# Patient Record
Sex: FEMALE | Employment: PART TIME | URBAN - METROPOLITAN AREA
[De-identification: names, ages, dates, MRNs, and addresses within clinical notes are randomized per-mention and may not be internally consistent; named-entity substitution may affect disease eponyms.]

---

## 2017-04-12 ENCOUNTER — OFFICE VISIT (OUTPATIENT)
Dept: FAMILY MEDICINE CLINIC | Age: 32
End: 2017-04-12
Payer: MEDICARE

## 2017-04-12 VITALS
DIASTOLIC BLOOD PRESSURE: 60 MMHG | WEIGHT: 153 LBS | HEART RATE: 100 BPM | HEIGHT: 63 IN | BODY MASS INDEX: 27.11 KG/M2 | TEMPERATURE: 99.1 F | SYSTOLIC BLOOD PRESSURE: 128 MMHG

## 2017-04-12 DIAGNOSIS — H66.011 ACUTE SUPPURATIVE OTITIS MEDIA OF RIGHT EAR WITH SPONTANEOUS RUPTURE OF TYMPANIC MEMBRANE, RECURRENCE NOT SPECIFIED: Primary | ICD-10-CM

## 2017-04-12 PROCEDURE — 99213 OFFICE O/P EST LOW 20 MIN: CPT | Performed by: FAMILY MEDICINE

## 2017-04-12 RX ORDER — DOCOSAHEXAENOIC ACID 200 MG
CAPSULE ORAL
Refills: 11 | COMMUNITY
Start: 2017-02-03 | End: 2018-08-06 | Stop reason: SDUPTHER

## 2017-04-12 RX ORDER — AMOXICILLIN 875 MG/1
875 TABLET, COATED ORAL 2 TIMES DAILY
Qty: 20 TABLET | Refills: 0 | Status: SHIPPED | OUTPATIENT
Start: 2017-04-12 | End: 2017-04-22

## 2017-04-12 ASSESSMENT — ENCOUNTER SYMPTOMS
SORE THROAT: 0
EYES NEGATIVE: 1
SHORTNESS OF BREATH: 0
RHINORRHEA: 1
COUGH: 1

## 2017-12-13 ENCOUNTER — HOSPITAL ENCOUNTER (EMERGENCY)
Age: 32
Discharge: HOME | End: 2017-12-13
Attending: EMERGENCY MEDICINE
Payer: COMMERCIAL

## 2017-12-13 ENCOUNTER — APPOINTMENT (OUTPATIENT)
Dept: CT IMAGING | Age: 32
End: 2017-12-13
Payer: COMMERCIAL

## 2017-12-13 VITALS
RESPIRATION RATE: 16 BRPM | HEIGHT: 63 IN | OXYGEN SATURATION: 97 % | WEIGHT: 160 LBS | HEART RATE: 106 BPM | TEMPERATURE: 98.9 F | SYSTOLIC BLOOD PRESSURE: 103 MMHG | DIASTOLIC BLOOD PRESSURE: 76 MMHG | BODY MASS INDEX: 28.35 KG/M2

## 2017-12-13 DIAGNOSIS — J03.90 TONSILLITIS, PHLEGMONOUS: Primary | ICD-10-CM

## 2017-12-13 LAB
ABSOLUTE EOS #: 0 K/UL (ref 0–0.4)
ABSOLUTE IMMATURE GRANULOCYTE: ABNORMAL K/UL (ref 0–0.3)
ABSOLUTE LYMPH #: 1.1 K/UL (ref 1–4.8)
ABSOLUTE MONO #: 1.3 K/UL (ref 0.2–0.8)
ANION GAP SERPL CALCULATED.3IONS-SCNC: 21 MMOL/L (ref 9–17)
BASOPHILS # BLD: 0 % (ref 0–2)
BASOPHILS ABSOLUTE: 0 K/UL (ref 0–0.2)
BUN BLDV-MCNC: 12 MG/DL (ref 6–20)
BUN/CREAT BLD: 16 (ref 9–20)
CALCIUM SERPL-MCNC: 9.4 MG/DL (ref 8.6–10.4)
CHLORIDE BLD-SCNC: 96 MMOL/L (ref 98–107)
CO2: 20 MMOL/L (ref 20–31)
CREAT SERPL-MCNC: 0.77 MG/DL (ref 0.5–0.9)
DIFFERENTIAL TYPE: ABNORMAL
DIRECT EXAM: NORMAL
EOSINOPHILS RELATIVE PERCENT: 0 % (ref 1–4)
GFR AFRICAN AMERICAN: >60 ML/MIN
GFR NON-AFRICAN AMERICAN: >60 ML/MIN
GFR SERPL CREATININE-BSD FRML MDRD: ABNORMAL ML/MIN/{1.73_M2}
GFR SERPL CREATININE-BSD FRML MDRD: ABNORMAL ML/MIN/{1.73_M2}
GLUCOSE BLD-MCNC: 112 MG/DL (ref 70–99)
HCT VFR BLD CALC: 44.5 % (ref 36–46)
HEMOGLOBIN: 14.8 G/DL (ref 12–16)
IMMATURE GRANULOCYTES: ABNORMAL %
LACTIC ACID: 1.4 MMOL/L (ref 0.5–2.2)
LYMPHOCYTES # BLD: 9 % (ref 24–44)
Lab: NORMAL
MCH RBC QN AUTO: 30.1 PG (ref 26–34)
MCHC RBC AUTO-ENTMCNC: 33.4 G/DL (ref 31–37)
MCV RBC AUTO: 90.1 FL (ref 80–100)
MONOCYTES # BLD: 10 % (ref 1–7)
PDW BLD-RTO: 12.7 % (ref 11.5–14.5)
PLATELET # BLD: 321 K/UL (ref 130–400)
PLATELET ESTIMATE: ABNORMAL
PMV BLD AUTO: 7.3 FL (ref 6–12)
POTASSIUM SERPL-SCNC: 3.8 MMOL/L (ref 3.7–5.3)
RBC # BLD: 4.94 M/UL (ref 4–5.2)
RBC # BLD: ABNORMAL 10*6/UL
SEG NEUTROPHILS: 81 % (ref 36–66)
SEGMENTED NEUTROPHILS ABSOLUTE COUNT: 10.5 K/UL (ref 1.8–7.7)
SODIUM BLD-SCNC: 137 MMOL/L (ref 135–144)
SPECIMEN DESCRIPTION: NORMAL
STATUS: NORMAL
STATUS: NORMAL
TSH SERPL DL<=0.05 MIU/L-ACNC: 0.69 MIU/L (ref 0.3–5)
WBC # BLD: 13 K/UL (ref 3.5–11)
WBC # BLD: ABNORMAL 10*3/UL

## 2017-12-13 PROCEDURE — 87651 STREP A DNA AMP PROBE: CPT

## 2017-12-13 PROCEDURE — 96376 TX/PRO/DX INJ SAME DRUG ADON: CPT

## 2017-12-13 PROCEDURE — 96375 TX/PRO/DX INJ NEW DRUG ADDON: CPT

## 2017-12-13 PROCEDURE — 85025 COMPLETE CBC W/AUTO DIFF WBC: CPT

## 2017-12-13 PROCEDURE — 83605 ASSAY OF LACTIC ACID: CPT

## 2017-12-13 PROCEDURE — 96365 THER/PROPH/DIAG IV INF INIT: CPT

## 2017-12-13 PROCEDURE — 80048 BASIC METABOLIC PNL TOTAL CA: CPT

## 2017-12-13 PROCEDURE — 84443 ASSAY THYROID STIM HORMONE: CPT

## 2017-12-13 PROCEDURE — 99283 EMERGENCY DEPT VISIT LOW MDM: CPT

## 2017-12-13 PROCEDURE — 6360000004 HC RX CONTRAST MEDICATION: Performed by: NURSE PRACTITIONER

## 2017-12-13 PROCEDURE — 70491 CT SOFT TISSUE NECK W/DYE: CPT

## 2017-12-13 PROCEDURE — 2580000003 HC RX 258: Performed by: NURSE PRACTITIONER

## 2017-12-13 PROCEDURE — 6360000002 HC RX W HCPCS: Performed by: NURSE PRACTITIONER

## 2017-12-13 PROCEDURE — 36415 COLL VENOUS BLD VENIPUNCTURE: CPT

## 2017-12-13 RX ORDER — HYDROCODONE BITARTRATE AND ACETAMINOPHEN 5; 325 MG/1; MG/1
1 TABLET ORAL EVERY 6 HOURS PRN
Qty: 20 TABLET | Refills: 0 | Status: SHIPPED | OUTPATIENT
Start: 2017-12-13 | End: 2017-12-13

## 2017-12-13 RX ORDER — ONDANSETRON 4 MG/1
4 TABLET, ORALLY DISINTEGRATING ORAL EVERY 8 HOURS PRN
Qty: 20 TABLET | Refills: 0 | Status: SHIPPED | OUTPATIENT
Start: 2017-12-13 | End: 2017-12-13

## 2017-12-13 RX ORDER — MORPHINE SULFATE 2 MG/ML
2 INJECTION, SOLUTION INTRAMUSCULAR; INTRAVENOUS ONCE
Status: COMPLETED | OUTPATIENT
Start: 2017-12-13 | End: 2017-12-13

## 2017-12-13 RX ORDER — 0.9 % SODIUM CHLORIDE 0.9 %
50 INTRAVENOUS SOLUTION INTRAVENOUS ONCE
Status: COMPLETED | OUTPATIENT
Start: 2017-12-13 | End: 2017-12-13

## 2017-12-13 RX ORDER — 0.9 % SODIUM CHLORIDE 0.9 %
1000 INTRAVENOUS SOLUTION INTRAVENOUS ONCE
Status: COMPLETED | OUTPATIENT
Start: 2017-12-13 | End: 2017-12-13

## 2017-12-13 RX ORDER — AMOXICILLIN AND CLAVULANATE POTASSIUM 875; 125 MG/1; MG/1
1 TABLET, FILM COATED ORAL 2 TIMES DAILY
COMMUNITY
End: 2018-08-06 | Stop reason: ALTCHOICE

## 2017-12-13 RX ORDER — HYDROCODONE BITARTRATE AND ACETAMINOPHEN 5; 325 MG/1; MG/1
1 TABLET ORAL EVERY 6 HOURS PRN
Qty: 20 TABLET | Refills: 0 | Status: SHIPPED | OUTPATIENT
Start: 2017-12-13 | End: 2017-12-20

## 2017-12-13 RX ORDER — DEXAMETHASONE SODIUM PHOSPHATE 10 MG/ML
10 INJECTION INTRAMUSCULAR; INTRAVENOUS ONCE
Status: COMPLETED | OUTPATIENT
Start: 2017-12-13 | End: 2017-12-13

## 2017-12-13 RX ORDER — ONDANSETRON 4 MG/1
4 TABLET, ORALLY DISINTEGRATING ORAL EVERY 8 HOURS PRN
Qty: 20 TABLET | Refills: 0 | Status: SHIPPED | OUTPATIENT
Start: 2017-12-13 | End: 2018-10-04

## 2017-12-13 RX ORDER — SODIUM CHLORIDE 0.9 % (FLUSH) 0.9 %
10 SYRINGE (ML) INJECTION PRN
Status: DISCONTINUED | OUTPATIENT
Start: 2017-12-13 | End: 2017-12-13 | Stop reason: HOSPADM

## 2017-12-13 RX ORDER — ONDANSETRON 2 MG/ML
4 INJECTION INTRAMUSCULAR; INTRAVENOUS ONCE
Status: COMPLETED | OUTPATIENT
Start: 2017-12-13 | End: 2017-12-13

## 2017-12-13 RX ADMIN — ONDANSETRON 4 MG: 2 INJECTION INTRAMUSCULAR; INTRAVENOUS at 15:03

## 2017-12-13 RX ADMIN — AMPICILLIN SODIUM AND SULBACTAM SODIUM 1.5 G: 1; .5 INJECTION, POWDER, FOR SOLUTION INTRAMUSCULAR; INTRAVENOUS at 15:14

## 2017-12-13 RX ADMIN — SODIUM CHLORIDE 1000 ML: 9 INJECTION, SOLUTION INTRAVENOUS at 15:03

## 2017-12-13 RX ADMIN — SODIUM CHLORIDE 50 ML: 9 INJECTION, SOLUTION INTRAVENOUS at 15:17

## 2017-12-13 RX ADMIN — DEXAMETHASONE SODIUM PHOSPHATE 10 MG: 10 INJECTION INTRAMUSCULAR; INTRAVENOUS at 15:03

## 2017-12-13 RX ADMIN — MORPHINE SULFATE 2 MG: 2 INJECTION, SOLUTION INTRAMUSCULAR; INTRAVENOUS at 15:03

## 2017-12-13 RX ADMIN — SODIUM CHLORIDE 1000 ML: 9 INJECTION, SOLUTION INTRAVENOUS at 16:45

## 2017-12-13 RX ADMIN — Medication 10 ML: at 15:17

## 2017-12-13 RX ADMIN — MORPHINE SULFATE 2 MG: 2 INJECTION, SOLUTION INTRAMUSCULAR; INTRAVENOUS at 15:41

## 2017-12-13 RX ADMIN — SODIUM CHLORIDE 1000 ML: 9 INJECTION, SOLUTION INTRAVENOUS at 16:00

## 2017-12-13 RX ADMIN — IOPAMIDOL 80 ML: 755 INJECTION, SOLUTION INTRAVENOUS at 15:17

## 2017-12-13 ASSESSMENT — PAIN DESCRIPTION - LOCATION: LOCATION: EAR;THROAT

## 2017-12-13 ASSESSMENT — PAIN SCALES - GENERAL
PAINLEVEL_OUTOF10: 10
PAINLEVEL_OUTOF10: 10
PAINLEVEL_OUTOF10: 4
PAINLEVEL_OUTOF10: 10

## 2017-12-13 ASSESSMENT — PAIN DESCRIPTION - ORIENTATION: ORIENTATION: RIGHT

## 2017-12-13 ASSESSMENT — ENCOUNTER SYMPTOMS
VOMITING: 0
SHORTNESS OF BREATH: 0
ABDOMINAL DISTENTION: 0
NAUSEA: 1
SORE THROAT: 1
COUGH: 0
SINUS PRESSURE: 0

## 2017-12-13 ASSESSMENT — PAIN DESCRIPTION - DESCRIPTORS: DESCRIPTORS: SHOOTING;SHARP;STABBING

## 2017-12-13 ASSESSMENT — PAIN DESCRIPTION - PAIN TYPE: TYPE: ACUTE PAIN

## 2017-12-13 NOTE — ED PROVIDER NOTES
[address] [place] [address] ED  eMERGENCY dEPARTMENT eNCOUnter      Pt Name: Gio Henderson  MRN: [id number]  [place] 1985  Date of evaluation: 12/13/2017  Provider: Osei Vasquez NP    CHIEF COMPLAINT       Chief Complaint   Patient presents with    Pharyngitis     since yesterday    Abscess     possible peritonsilar          HISTORY OF PRESENT ILLNESS  (Location/Symptom, Timing/Onset, Context/Setting, Quality, Duration, Modifying Factors, Severity.)   Gio Henderson is a 33 y.o. female who presents to the emergency department Via private auto with complaints of sore throat that started yesterday. She has right-sided sore throat that radiates to the right ear. She rates the pain at a 10 and describes it as burning. Aggravating factors include swallowing. She has been eating and drinking very little over the past 24 hours due to pain. No cough, chest pain or shortness of breath. She is unsure of fever but has had hot and cold chills. She was evaluated by a family friend/physician while at a [place] party last evening. She was placed on Augmentin and advised to follow-up with the ENT for possible peritonsillar abscess. When she called the ENT this morning she was advised to come to the emergency room for further evaluation. Nursing Notes were reviewed. ALLERGIES     Review of patient's allergies indicates no known allergies.     CURRENT MEDICATIONS       Current Discharge Medication List      CONTINUE these medications which have NOT CHANGED    Details   amoxicillin-clavulanate (AUGMENTIN) 875-125 MG per tablet Take 1 tablet by mouth 2 times daily Started 12/12/17      PRENATAL  MG CAPS Refills: 11      vitamin D (CHOLECALCIFEROL) 1000 UNIT TABS tablet Take 1,000 Units by mouth daily      naproxen (NAPROSYN) 500 MG tablet Take 500 mg by mouth 2 times daily (with meals)      Multiple Vitamin (DAILY MULTIVITAMIN PO) Take by mouth      ibuprofen (ADVIL;MOTRIN) 800 MG tablet Take 800 mg by mouth daily             PAST MEDICAL HISTORY   History reviewed. No pertinent past medical history. SURGICAL HISTORY           Procedure Laterality Date     SECTION      DENTAL SURGERY      ECTOPIC PREGNANCY SURGERY      r fallopian tube gone         FAMILY HISTORY     History reviewed. No pertinent family history. No family status information on file. SOCIAL HISTORY      reports that she has quit smoking. She does not have any smokeless tobacco history on file. She reports that she drinks alcohol. She reports that she does not use drugs. REVIEW OF SYSTEMS    (2-9 systems for level 4, 10 or more for level 5)     Review of Systems   Constitutional: Positive for chills and diaphoresis. Negative for fatigue. HENT: Positive for ear pain and sore throat. Negative for drooling, ear discharge, sinus pressure and sneezing. Respiratory: Negative for cough and shortness of breath. Cardiovascular: Negative for chest pain and palpitations. Gastrointestinal: Positive for nausea. Negative for abdominal distention and vomiting. Musculoskeletal: Negative for arthralgias and myalgias. Neurological: Negative for dizziness and headaches. Except as noted above the remainder of the review of systems was reviewed and negative. PHYSICAL EXAM    (up to 7 for level 4, 8 or more for level 5)     ED Triage Vitals [17 1431]   BP Temp Temp Source Pulse Resp SpO2 Height Weight   103/76 98.9 °F (37.2 °C) Oral 132 16 97 % 5' 3\" (1.6 m) 160 lb (72.6 kg)       Physical Exam   Constitutional: She is oriented to person, place, and time. She appears well-developed and well-nourished. HENT:   Head: Normocephalic and atraumatic. Right Ear: Tympanic membrane normal. No swelling or tenderness. Left Ear: Tympanic membrane normal. No swelling or tenderness. Mouth/Throat: Uvula is midline. No uvula swelling. Posterior oropharynx has right side edema and exudates.  Tolerating secretions   Eyes: Vitals:    17 1431 17 1720   BP: 103/76    Pulse: 132 106   Resp: 16    Temp: 98.9 °F (37.2 °C)    TempSrc: Oral    SpO2: 97%    Weight: 160 lb (72.6 kg)    Height: 5' 3\" (1.6 m)        Medical Decision Makin-year-old female with sore throat. She is afebrile but does appear ill. She is tachycardic at 132. Strep screen is negative. White count was slightly elevated at 13,000. CT soft tissue neck does not have any evidence of abscess but does show right tonsillar enlargement and possibly developing phlegmon. She had some improvement with pain medication and antiemetics. Heart rate has improved to 105 after 3 L of normal saline. She will be discharged home with pain medication, antiemetics and instructions to follow-up with the ENT or oral surgeon. She will continue to take her Augmentin as previously prescribed. There is no problem list on file for this patient. FINAL IMPRESSION      1. Tonsillitis, phlegmonous          DISPOSITION/PLAN   DISPOSITION Decision to Discharge    PATIENT REFERRED TO:   Arianna Fuentes MD  [address] [place] [address]  [phone]    Call in 1 day      [address], [address] [facility] [address] [facility]  [phone]      Follow up with the ENT or the oral surgeon    [facility] ED  [address] [facility]  [phone]    If symptoms worsen      DISCHARGE MEDICATIONS:     Current Discharge Medication List      START taking these medications    Details   ondansetron (ZOFRAN ODT) 4 MG disintegrating tablet Take 1 tablet by mouth every 8 hours as needed for Nausea  Qty: 20 tablet, Refills: 0      HYDROcodone-acetaminophen (NORCO) 5-325 MG per tablet Take 1 tablet by mouth every 6 hours as needed for Pain .   Qty: 20 tablet, Refills: 0             (Please note that portions of this note were completed with a voice recognition program.  Efforts were made to edit the dictations but occasionally words are mis-transcribed. )    SAGE HORNE, LISANDRO Horne NP  12/13/17 [id number]

## 2018-05-03 ENCOUNTER — HOSPITAL ENCOUNTER (OUTPATIENT)
Age: 33
Discharge: HOME | End: 2018-05-03
Payer: COMMERCIAL

## 2018-05-03 ENCOUNTER — HOSPITAL ENCOUNTER (OUTPATIENT)
Age: 33
Setting detail: SPECIMEN
Discharge: HOME | End: 2018-05-03

## 2018-05-03 LAB
FOLLICLE STIMULATING HORMONE: 3.2 U/L (ref 1.7–21.5)
GLUCOSE BLD-MCNC: 98 MG/DL (ref 70–99)
HCG QUANTITATIVE: <1 IU/L
LH: 2.5 U/L (ref 1–95.6)
TSH SERPL DL<=0.05 MIU/L-ACNC: 1.33 MIU/L (ref 0.3–5)

## 2018-05-03 PROCEDURE — 83002 ASSAY OF GONADOTROPIN (LH): CPT

## 2018-05-03 PROCEDURE — 36415 COLL VENOUS BLD VENIPUNCTURE: CPT

## 2018-05-03 PROCEDURE — 84443 ASSAY THYROID STIM HORMONE: CPT

## 2018-05-03 PROCEDURE — 83001 ASSAY OF GONADOTROPIN (FSH): CPT

## 2018-05-03 PROCEDURE — 82947 ASSAY GLUCOSE BLOOD QUANT: CPT

## 2018-05-03 PROCEDURE — 84702 CHORIONIC GONADOTROPIN TEST: CPT

## 2018-05-04 LAB
HPV SAMPLE: NORMAL
HPV SOURCE: NORMAL
HPV, GENOTYPE 16: NOT DETECTED
HPV, GENOTYPE 18: NOT DETECTED
HPV, HIGH RISK OTHER: NOT DETECTED
HPV, INTERPRETATION: NORMAL

## 2018-05-11 ENCOUNTER — EMPLOYEE WELLNESS (OUTPATIENT)
Dept: OTHER | Age: 33
End: 2018-05-11

## 2018-05-11 LAB
CHOLESTEROL/HDL RATIO: 3.6
CHOLESTEROL: 192 MG/DL
GLUCOSE BLD-MCNC: 93 MG/DL (ref 70–99)
HDLC SERPL-MCNC: 53 MG/DL
LDL CHOLESTEROL: 118 MG/DL (ref 0–130)
PATIENT FASTING?: NORMAL
TRIGL SERPL-MCNC: 103 MG/DL
VLDLC SERPL CALC-MCNC: NORMAL MG/DL (ref 1–30)

## 2018-05-21 VITALS — WEIGHT: 172 LBS | BODY MASS INDEX: 30.47 KG/M2

## 2018-05-23 ENCOUNTER — OFFICE VISIT (OUTPATIENT)
Dept: FAMILY MEDICINE CLINIC | Age: 33
End: 2018-05-23
Payer: COMMERCIAL

## 2018-05-23 VITALS
WEIGHT: 171 LBS | TEMPERATURE: 98.7 F | HEART RATE: 90 BPM | DIASTOLIC BLOOD PRESSURE: 70 MMHG | HEIGHT: 63 IN | BODY MASS INDEX: 30.3 KG/M2 | RESPIRATION RATE: 18 BRPM | SYSTOLIC BLOOD PRESSURE: 92 MMHG | OXYGEN SATURATION: 99 %

## 2018-05-23 DIAGNOSIS — H65.92 OTITIS MEDIA WITH EFFUSION, LEFT: Primary | ICD-10-CM

## 2018-05-23 PROCEDURE — 99202 OFFICE O/P NEW SF 15 MIN: CPT | Performed by: NURSE PRACTITIONER

## 2018-05-23 RX ORDER — PREDNISONE 20 MG/1
20 TABLET ORAL 2 TIMES DAILY
Qty: 10 TABLET | Refills: 0 | Status: SHIPPED | OUTPATIENT
Start: 2018-05-23 | End: 2018-05-28

## 2018-05-23 RX ORDER — FLUTICASONE PROPIONATE 50 MCG
2 SPRAY, SUSPENSION (ML) NASAL DAILY
Qty: 1 BOTTLE | Refills: 0 | Status: SHIPPED | OUTPATIENT
Start: 2018-05-23 | End: 2018-08-06 | Stop reason: ALTCHOICE

## 2018-05-23 ASSESSMENT — ENCOUNTER SYMPTOMS
WHEEZING: 0
RHINORRHEA: 1
SINUS PRESSURE: 0
EYE REDNESS: 0
COUGH: 0
VOICE CHANGE: 0
CHEST TIGHTNESS: 0
SHORTNESS OF BREATH: 0
EYE DISCHARGE: 0
SORE THROAT: 0

## 2018-05-23 ASSESSMENT — PATIENT HEALTH QUESTIONNAIRE - PHQ9
2. FEELING DOWN, DEPRESSED OR HOPELESS: 0
SUM OF ALL RESPONSES TO PHQ QUESTIONS 1-9: 0
SUM OF ALL RESPONSES TO PHQ9 QUESTIONS 1 & 2: 0
1. LITTLE INTEREST OR PLEASURE IN DOING THINGS: 0

## 2018-05-26 LAB — CYTOLOGY REPORT: NORMAL

## 2018-08-06 ENCOUNTER — OFFICE VISIT (OUTPATIENT)
Dept: OBGYN CLINIC | Age: 33
End: 2018-08-06
Payer: COMMERCIAL

## 2018-08-06 ENCOUNTER — TELEPHONE (OUTPATIENT)
Dept: OBGYN CLINIC | Age: 33
End: 2018-08-06

## 2018-08-06 ENCOUNTER — HOSPITAL ENCOUNTER (OUTPATIENT)
Age: 33
Discharge: HOME | End: 2018-08-06
Payer: COMMERCIAL

## 2018-08-06 ENCOUNTER — HOSPITAL ENCOUNTER (OUTPATIENT)
Dept: ULTRASOUND IMAGING | Age: 33
Discharge: HOME | End: 2018-08-08
Payer: COMMERCIAL

## 2018-08-06 VITALS
HEART RATE: 61 BPM | BODY MASS INDEX: 30.49 KG/M2 | HEIGHT: 63 IN | SYSTOLIC BLOOD PRESSURE: 108 MMHG | DIASTOLIC BLOOD PRESSURE: 66 MMHG | WEIGHT: 172.1 LBS

## 2018-08-06 DIAGNOSIS — Z87.59 HISTORY OF ECTOPIC PREGNANCY: Primary | ICD-10-CM

## 2018-08-06 DIAGNOSIS — F12.90 MARIJUANA USE: ICD-10-CM

## 2018-08-06 DIAGNOSIS — Z87.59 HISTORY OF ECTOPIC PREGNANCY: ICD-10-CM

## 2018-08-06 DIAGNOSIS — N91.2 AMENORRHEA: ICD-10-CM

## 2018-08-06 DIAGNOSIS — N91.2 AMENORRHEA: Primary | ICD-10-CM

## 2018-08-06 DIAGNOSIS — Z98.891 HISTORY OF C-SECTION: ICD-10-CM

## 2018-08-06 DIAGNOSIS — Z86.32 HISTORY OF GESTATIONAL DIABETES: ICD-10-CM

## 2018-08-06 LAB
CONTROL: PRESENT
HCG QUANTITATIVE: 974 IU/L
PREGNANCY TEST URINE, POC: POSITIVE

## 2018-08-06 PROCEDURE — 99203 OFFICE O/P NEW LOW 30 MIN: CPT | Performed by: ADVANCED PRACTICE MIDWIFE

## 2018-08-06 PROCEDURE — 81025 URINE PREGNANCY TEST: CPT | Performed by: ADVANCED PRACTICE MIDWIFE

## 2018-08-06 PROCEDURE — 76817 TRANSVAGINAL US OBSTETRIC: CPT

## 2018-08-06 PROCEDURE — 84702 CHORIONIC GONADOTROPIN TEST: CPT

## 2018-08-06 PROCEDURE — 76801 OB US < 14 WKS SINGLE FETUS: CPT

## 2018-08-06 PROCEDURE — 36415 COLL VENOUS BLD VENIPUNCTURE: CPT

## 2018-08-06 RX ORDER — DOCOSAHEXAENOIC ACID 200 MG
1 CAPSULE ORAL DAILY
Qty: 30 CAPSULE | Refills: 12 | Status: SHIPPED | OUTPATIENT
Start: 2018-08-06 | End: 2019-11-14

## 2018-08-06 NOTE — PROGRESS NOTES
Reviewed pt's ultrasound report, pt to f/u with [facility]. To obtain once then again after 48 hours. Will place a follow up Ultrasound once levels are reviewed.

## 2018-08-06 NOTE — PROGRESS NOTES
SUBJECTIVE:    Chief Complaint:  Chief Complaint   Patient presents with    Amenorrhea     lmp 7/1/18 positive urine pregnancy test       HPI:    Last Dotson  is being seen today for missed menses. She reports a  positive pregnancy test . 8/5/18  This  is not a planned pregnancy. She is  accepting at this time. LMP: 7/1/2018      Sure and definite: Yes     28 day cycle: Yes    She was not on a contraceptive at the time of conception. Estimated weeks gestation today : 5w1d  Tentative ROJELIO: 4/7/18    Relationship with FOB:     Is extremely anxious because she had a history of ectopic pregnancy. States she was told she was a few hours away from rupture. She is requesting an early U/S to confirm IUP. OBJECTIVE:    VS as documented in Epic. Mother's ethnicity:     Father's ethnicity:      She is complaining today of:   Pain: No  Cramping: No  Bleeding or spotting: No    Nausea: No  Vomiting: No    Breast enlargement/tenderness: No  Fatigue: No  Frequency of urination: No    Previous high risk obstetricah l history: h/o c-sectionx2 gestational diabetes  OB History   No data available       ROS was done and is negative except as documented in HPI. History was reviewed as documented on Epic Navigator. ASSESSMENT:  Missed menses with + UCG    PLAN:  UCG was done and noted as positive  Prenatal vitamins were ordered: Yes  Ultrasound for dating and viability and confirm IUP was ordered: Yes  OB form was given: Yes  Initial information on genetic testing was given:Yes  1 hour glucola was ordered: No- Pt order at N/V pt plans to take at that visit  She was instructed to call with any concerns or worsening of any symptoms  She will return for New OB intake visit    Patient was seen with total face to face time of 15 minutes. More than 50% of this visit was spent face to face coordinating plan of care and answering questions regarding     Diagnosis Orders   1.  Amenorrhea  POCT urine pregnancy . She was also counseled on her preventative health maintenance recommendations and follow-up.

## 2018-08-08 ENCOUNTER — HOSPITAL ENCOUNTER (OUTPATIENT)
Age: 33
Discharge: HOME | End: 2018-08-08
Payer: COMMERCIAL

## 2018-08-08 DIAGNOSIS — Z87.59 HISTORY OF ECTOPIC PREGNANCY: ICD-10-CM

## 2018-08-08 LAB — HCG QUANTITATIVE: 2236 IU/L

## 2018-08-08 PROCEDURE — 84702 CHORIONIC GONADOTROPIN TEST: CPT

## 2018-08-08 PROCEDURE — 36415 COLL VENOUS BLD VENIPUNCTURE: CPT

## 2018-08-09 DIAGNOSIS — N91.2 AMENORRHEA: Primary | ICD-10-CM

## 2018-08-09 DIAGNOSIS — Z87.59 HISTORY OF ECTOPIC PREGNANCY: ICD-10-CM

## 2018-08-16 ENCOUNTER — HOSPITAL ENCOUNTER (OUTPATIENT)
Dept: ULTRASOUND IMAGING | Age: 33
Discharge: HOME | End: 2018-08-18
Payer: COMMERCIAL

## 2018-08-16 DIAGNOSIS — Z87.59 HISTORY OF ECTOPIC PREGNANCY: ICD-10-CM

## 2018-08-16 DIAGNOSIS — N91.2 AMENORRHEA: ICD-10-CM

## 2018-08-16 PROCEDURE — 76817 TRANSVAGINAL US OBSTETRIC: CPT

## 2018-08-16 PROCEDURE — 76801 OB US < 14 WKS SINGLE FETUS: CPT

## 2018-09-06 ENCOUNTER — HOSPITAL ENCOUNTER (OUTPATIENT)
Age: 33
Setting detail: SPECIMEN
Discharge: HOME | End: 2018-09-06
Payer: COMMERCIAL

## 2018-09-06 ENCOUNTER — INITIAL PRENATAL (OUTPATIENT)
Dept: OBGYN CLINIC | Age: 33
End: 2018-09-06

## 2018-09-06 VITALS
HEART RATE: 100 BPM | WEIGHT: 168.3 LBS | DIASTOLIC BLOOD PRESSURE: 88 MMHG | SYSTOLIC BLOOD PRESSURE: 127 MMHG | BODY MASS INDEX: 29.81 KG/M2

## 2018-09-06 DIAGNOSIS — Z34.91 PRENATAL CARE IN FIRST TRIMESTER: Primary | ICD-10-CM

## 2018-09-06 DIAGNOSIS — Z34.91 PRENATAL CARE IN FIRST TRIMESTER: ICD-10-CM

## 2018-09-06 DIAGNOSIS — Z34.81 MULTIGRAVIDA IN FIRST TRIMESTER: ICD-10-CM

## 2018-09-06 DIAGNOSIS — Z98.891 HISTORY OF 2 CESAREAN SECTIONS: ICD-10-CM

## 2018-09-06 DIAGNOSIS — Z86.32 HISTORY OF GESTATIONAL DIABETES: ICD-10-CM

## 2018-09-06 LAB
ABO/RH: NORMAL
ABSOLUTE EOS #: 0.15 K/UL (ref 0–0.44)
ABSOLUTE IMMATURE GRANULOCYTE: 0.03 K/UL (ref 0–0.3)
ABSOLUTE LYMPH #: 2.16 K/UL (ref 1.1–3.7)
ABSOLUTE MONO #: 0.48 K/UL (ref 0.1–1.2)
AMPHETAMINE SCREEN URINE: NEGATIVE
ANTIBODY SCREEN: NEGATIVE
BARBITURATE SCREEN URINE: NEGATIVE
BASOPHILS # BLD: 1 % (ref 0–2)
BASOPHILS ABSOLUTE: 0.05 K/UL (ref 0–0.2)
BENZODIAZEPINE SCREEN, URINE: NEGATIVE
BILIRUBIN URINE: NEGATIVE
BUPRENORPHINE URINE: NORMAL
CANNABINOID SCREEN URINE: NEGATIVE
COCAINE METABOLITE, URINE: NEGATIVE
COLOR: YELLOW
COMMENT UA: ABNORMAL
DIFFERENTIAL TYPE: ABNORMAL
EOSINOPHILS RELATIVE PERCENT: 2 % (ref 1–4)
GLUCOSE ADMINISTRATION: NORMAL
GLUCOSE TOLERANCE SCREEN 50G: 132 MG/DL (ref 70–135)
GLUCOSE URINE: NEGATIVE
HCT VFR BLD CALC: 39.7 % (ref 36.3–47.1)
HEMOGLOBIN: 12.8 G/DL (ref 11.9–15.1)
HEPATITIS B SURFACE ANTIGEN: NONREACTIVE
HIV AG/AB: NONREACTIVE
IMMATURE GRANULOCYTES: 0 %
KETONES, URINE: NEGATIVE
LEUKOCYTE ESTERASE, URINE: NEGATIVE
LYMPHOCYTES # BLD: 25 % (ref 24–43)
MCH RBC QN AUTO: 29.4 PG (ref 25.2–33.5)
MCHC RBC AUTO-ENTMCNC: 32.2 G/DL (ref 28.4–34.8)
MCV RBC AUTO: 91.1 FL (ref 82.6–102.9)
MDMA URINE: NORMAL
METHADONE SCREEN, URINE: NEGATIVE
METHAMPHETAMINE, URINE: NORMAL
MONOCYTES # BLD: 6 % (ref 3–12)
NITRITE, URINE: NEGATIVE
NRBC AUTOMATED: 0 PER 100 WBC
OPIATES, URINE: NEGATIVE
OXYCODONE SCREEN URINE: NEGATIVE
PDW BLD-RTO: 12.1 % (ref 11.8–14.4)
PH UA: 6 (ref 5–8)
PHENCYCLIDINE, URINE: NEGATIVE
PLATELET # BLD: 306 K/UL (ref 138–453)
PLATELET ESTIMATE: ABNORMAL
PMV BLD AUTO: 9.2 FL (ref 8.1–13.5)
PROPOXYPHENE, URINE: NORMAL
PROTEIN UA: NEGATIVE
RBC # BLD: 4.36 M/UL (ref 3.95–5.11)
RBC # BLD: ABNORMAL 10*6/UL
RUBV IGG SER QL: 23.4 IU/ML
SEG NEUTROPHILS: 66 % (ref 36–65)
SEGMENTED NEUTROPHILS ABSOLUTE COUNT: 5.62 K/UL (ref 1.5–8.1)
SPECIFIC GRAVITY UA: 1 (ref 1–1.03)
T. PALLIDUM, IGG: NONREACTIVE
TEST INFORMATION: NORMAL
TRICYCLIC ANTIDEPRESSANTS, UR: NORMAL
TURBIDITY: CLEAR
URINE HGB: NEGATIVE
UROBILINOGEN, URINE: NORMAL
WBC # BLD: 8.5 K/UL (ref 3.5–11.3)
WBC # BLD: ABNORMAL 10*3/UL

## 2018-09-06 PROCEDURE — 0502F SUBSEQUENT PRENATAL CARE: CPT | Performed by: ADVANCED PRACTICE MIDWIFE

## 2018-09-06 NOTE — PROGRESS NOTES
INITIAL OBSTETRICAL VISIT EVALUATION:    Subjective:   Derik Clancy is being seen today for her new obstetrical visit. The pregnancy is  a planned pregnancy. She is  accepting at this time. Her last period was Patient's last menstrual period was 2018 (exact date). This was a sure and definite menses. She was not on OCP at conception. Patient reports difficulty sleeping due to increased urinary frequency. Is having NVP. She denies spotting, cramping or pain. Fetal Movement: None. Objective:   /88   Pulse 100   Wt 168 lb 4.8 oz (76.3 kg)   LMP 2018 (Exact Date)   Estimated gestational age is  8w3d  Mother's ethnicity:   Father's ethnicity:      Assessment:     Pregnancy @ 9 and 1/7 weeks   History C/S x 2  Desires repeat  History of GDM     Plan:   The patient was seen and a full history was reviewed and completed. List was initiated. Role of ultrasound in pregnancy discussed; fetal survey: requests    She was counseled on office policies. She was given the [employer]. She was educated about the anticipated course of prenatal care. The patient was counseled on Toxoplasmosis, HIV, Cystic Fibrosis, and Sickle Cell disease, as appropriate. She verbally consented to HIV testing. The patient was counseled on the risks of tobacco abuse, both maternal and fetal. She was instructed to stop smoking if currently using tobacco. Morbidity, mortality, and cessation programs were reviewed. The risks include but are not limited to increased risks of  labor,  delivery, premature rupture of membranes, intrauterine growth restriction, intrauterine fetal demise and abruptio placenta. Secondary smoke risks were also reviewed. Increases in cancer, respiratory problems, and sudden infant death syndrome were reviewed as well. The patient was informed of a 2-4% risk of congenital anomalies in the general population.    The patient was

## 2018-09-07 LAB
C. TRACHOMATIS DNA ,URINE: NEGATIVE
CULTURE: NORMAL
Lab: NORMAL
N. GONORRHOEAE DNA, URINE: NEGATIVE
SPECIMEN DESCRIPTION: NORMAL
STATUS: NORMAL

## 2018-09-14 ENCOUNTER — TELEPHONE (OUTPATIENT)
Dept: OBGYN CLINIC | Age: 33
End: 2018-09-14

## 2018-10-04 ENCOUNTER — ROUTINE PRENATAL (OUTPATIENT)
Dept: OBGYN CLINIC | Age: 33
End: 2018-10-04

## 2018-10-04 VITALS
BODY MASS INDEX: 30.01 KG/M2 | WEIGHT: 169.44 LBS | HEART RATE: 104 BPM | DIASTOLIC BLOOD PRESSURE: 85 MMHG | SYSTOLIC BLOOD PRESSURE: 126 MMHG

## 2018-10-04 DIAGNOSIS — Z3A.13 13 WEEKS GESTATION OF PREGNANCY: Primary | ICD-10-CM

## 2018-10-04 PROCEDURE — 0502F SUBSEQUENT PRENATAL CARE: CPT | Performed by: ADVANCED PRACTICE MIDWIFE

## 2018-11-08 ENCOUNTER — ROUTINE PRENATAL (OUTPATIENT)
Dept: OBGYN CLINIC | Age: 33
End: 2018-11-08

## 2018-11-08 VITALS
DIASTOLIC BLOOD PRESSURE: 73 MMHG | BODY MASS INDEX: 30.3 KG/M2 | WEIGHT: 171.06 LBS | SYSTOLIC BLOOD PRESSURE: 115 MMHG | HEART RATE: 119 BPM

## 2018-11-08 DIAGNOSIS — Z34.90 NORMAL REPEAT PREGNANCY, ANTEPARTUM: Primary | ICD-10-CM

## 2018-11-08 DIAGNOSIS — Z3A.18 18 WEEKS GESTATION OF PREGNANCY: ICD-10-CM

## 2018-11-08 DIAGNOSIS — Z23 NEEDS FLU SHOT: ICD-10-CM

## 2018-11-08 PROCEDURE — 0502F SUBSEQUENT PRENATAL CARE: CPT | Performed by: ADVANCED PRACTICE MIDWIFE

## 2018-11-08 NOTE — PROGRESS NOTES
SUBJECTIVE:    Rosanna Stock is here for her return OB visit. She reports  feeling fetal movement. She denies vaginal bleeding. She denies vaginal discharge. She denies leaking of fluid. She denies uterine cramping. She reports nausea and/or vomiting. Discussed unisom and vit. b6 patient declined. May try randy. OBJECTIVE:  Blood pressure 115/73, pulse 119, weight 171 lb 1 oz (77.6 kg), last menstrual period 07/01/2018, not currently breastfeeding. Total weight gain: 3 lb 1 oz (1.389 kg)    Rosanna Stock has not received the flu vaccine as appropriate. Declined. ASSESSMENT/PLAN:  IUP @ 18.1 weeks  S =D    1. Normal  repeat pregnancy, antepartum  Single sMAFP or Quad Screen was not ordered for the 16-20 wk gestational window. Declined genetic screening  18-22 wk fetal anatomy ultrasound was ordered. [facility] referral sent  2. 18 weeks gestation of pregnancy      She was counseled regarding all of the above:    Return in about 4 weeks (around 12/6/2018) for Return OB. Patient was seen with total face to face time of 15 minutes. More than 50% of this visit was education and counseling.     Electronically Signed By: Lillian Ramires

## 2018-11-28 ENCOUNTER — ROUTINE PRENATAL (OUTPATIENT)
Dept: OBGYN CLINIC | Age: 33
End: 2018-11-28

## 2018-11-28 VITALS
BODY MASS INDEX: 30.72 KG/M2 | HEART RATE: 107 BPM | DIASTOLIC BLOOD PRESSURE: 70 MMHG | SYSTOLIC BLOOD PRESSURE: 107 MMHG | WEIGHT: 173.4 LBS

## 2018-11-28 DIAGNOSIS — M62.08 DIASTASIS RECTI: ICD-10-CM

## 2018-11-28 DIAGNOSIS — O09.92 SUPERVISION OF HIGH RISK PREGNANCY IN SECOND TRIMESTER: Primary | ICD-10-CM

## 2018-11-28 DIAGNOSIS — Z3A.21 21 WEEKS GESTATION OF PREGNANCY: ICD-10-CM

## 2018-11-28 PROCEDURE — 0502F SUBSEQUENT PRENATAL CARE: CPT | Performed by: ADVANCED PRACTICE MIDWIFE

## 2018-11-28 NOTE — PROGRESS NOTES
SUBJECTIVE:  Rosanna Stock is here for her return OB visit. She reports fetal movement. She denies vaginal bleeding. She denies vaginal discharge. She denies leaking of fluid. She denies uterine contraction activity. She states occasional nausea denies vomiting. Feeling a little light headed  She denies retaining fluid in her extremities. States she is having some abdominal pain when she moves, area towards umbilicus     OBJECTIVE:  Blood pressure 107/70, pulse 107, weight 173 lb 6.4 oz (78.7 kg), last menstrual period 07/01/2018, not currently breastfeeding. Diastasis rectal 3 finger breaths apart noted, when pt lifts her had a small protrusion noted likely small hernia. Negative pain with palpation  ASSESSMENT/PLAN:  IUP @ 21w0 weeks  S = D  Diastasis Recti in pregnancy  Possible hernia  The problem list was reviewed and updated as needed with any new issues today    Rosanna Stock will begin to monitor fetal movement daily    Quad screen and or single marker AFP results were not discussed. Labs were not ordered. Discussed comfort measures and Warning signs  Pt to make f/u appt with Dr. Nikki Dykes regarding delivery   BMI and appropriate weight gain recommendations were discussed. Normal: BMI < 25: Gain 25-35 lb  Overweight: BMI 25-30: Gain 15-25 lb  Obese: BMI > 30: Gain 11-20 lb    Rosanna Stock was counseled regarding all of the above      Patient was seen with total face to face time of 15 minutes. More than 50% of this  visit was for counseling and education.

## 2018-12-04 ENCOUNTER — ROUTINE PRENATAL (OUTPATIENT)
Dept: PERINATAL CARE | Age: 33
End: 2018-12-04
Payer: COMMERCIAL

## 2018-12-04 VITALS
RESPIRATION RATE: 20 BRPM | WEIGHT: 175 LBS | HEIGHT: 63 IN | SYSTOLIC BLOOD PRESSURE: 112 MMHG | DIASTOLIC BLOOD PRESSURE: 72 MMHG | BODY MASS INDEX: 31.01 KG/M2 | TEMPERATURE: 97.6 F | HEART RATE: 88 BPM

## 2018-12-04 DIAGNOSIS — O34.592 ABNORMALITY OF UTERUS DURING PREGNANCY IN SECOND TRIMESTER: Primary | ICD-10-CM

## 2018-12-04 DIAGNOSIS — Z3A.22 22 WEEKS GESTATION OF PREGNANCY: ICD-10-CM

## 2018-12-04 DIAGNOSIS — O99.212 OBESITY AFFECTING PREGNANCY IN SECOND TRIMESTER: ICD-10-CM

## 2018-12-04 DIAGNOSIS — Z36.86 ENCOUNTER FOR SCREENING FOR RISK OF PRE-TERM LABOR: ICD-10-CM

## 2018-12-04 DIAGNOSIS — O34.219 PREVIOUS CESAREAN DELIVERY, ANTEPARTUM CONDITION OR COMPLICATION: ICD-10-CM

## 2018-12-04 PROCEDURE — 76817 TRANSVAGINAL US OBSTETRIC: CPT | Performed by: OBSTETRICS & GYNECOLOGY

## 2018-12-04 PROCEDURE — 76811 OB US DETAILED SNGL FETUS: CPT | Performed by: OBSTETRICS & GYNECOLOGY

## 2018-12-05 ENCOUNTER — TELEPHONE (OUTPATIENT)
Dept: OBGYN CLINIC | Age: 33
End: 2018-12-05

## 2018-12-06 ENCOUNTER — TELEPHONE (OUTPATIENT)
Dept: OBGYN CLINIC | Age: 33
End: 2018-12-06

## 2018-12-17 ENCOUNTER — TELEPHONE (OUTPATIENT)
Dept: OBGYN CLINIC | Age: 33
End: 2018-12-17

## 2019-01-02 ENCOUNTER — ROUTINE PRENATAL (OUTPATIENT)
Dept: OBGYN CLINIC | Age: 34
End: 2019-01-02

## 2019-01-02 ENCOUNTER — TELEPHONE (OUTPATIENT)
Dept: OBGYN CLINIC | Age: 34
End: 2019-01-02

## 2019-01-02 VITALS
BODY MASS INDEX: 31.61 KG/M2 | WEIGHT: 178.44 LBS | SYSTOLIC BLOOD PRESSURE: 118 MMHG | HEART RATE: 100 BPM | DIASTOLIC BLOOD PRESSURE: 81 MMHG

## 2019-01-02 DIAGNOSIS — Z86.32 HISTORY OF GESTATIONAL DIABETES: ICD-10-CM

## 2019-01-02 DIAGNOSIS — O99.212 OBESITY AFFECTING PREGNANCY IN SECOND TRIMESTER: ICD-10-CM

## 2019-01-02 DIAGNOSIS — Z3A.26 26 WEEKS GESTATION OF PREGNANCY: Primary | ICD-10-CM

## 2019-01-02 DIAGNOSIS — O34.592 ABNORMALITY OF UTERUS DURING PREGNANCY IN SECOND TRIMESTER: ICD-10-CM

## 2019-01-02 DIAGNOSIS — Z23 NEEDS FLU SHOT: ICD-10-CM

## 2019-01-02 DIAGNOSIS — Z98.891 HISTORY OF 2 CESAREAN SECTIONS: ICD-10-CM

## 2019-01-02 PROCEDURE — 0502F SUBSEQUENT PRENATAL CARE: CPT | Performed by: OBSTETRICS & GYNECOLOGY

## 2019-01-09 ENCOUNTER — ROUTINE PRENATAL (OUTPATIENT)
Dept: OBGYN CLINIC | Age: 34
End: 2019-01-09

## 2019-01-09 VITALS
HEART RATE: 125 BPM | WEIGHT: 181 LBS | DIASTOLIC BLOOD PRESSURE: 86 MMHG | SYSTOLIC BLOOD PRESSURE: 134 MMHG | BODY MASS INDEX: 32.06 KG/M2

## 2019-01-09 DIAGNOSIS — Z34.90 PRE-BIRTH VISIT FOR EXPECTANT MOTHER: Primary | ICD-10-CM

## 2019-01-10 ENCOUNTER — HOSPITAL ENCOUNTER (OUTPATIENT)
Age: 34
Discharge: HOME | End: 2019-01-10
Attending: OBSTETRICS & GYNECOLOGY | Admitting: OBSTETRICS & GYNECOLOGY
Payer: COMMERCIAL

## 2019-01-10 VITALS — DIASTOLIC BLOOD PRESSURE: 77 MMHG | HEART RATE: 94 BPM | RESPIRATION RATE: 16 BRPM | SYSTOLIC BLOOD PRESSURE: 120 MMHG

## 2019-01-10 PROBLEM — R93.89 ULTRASOUND SCAN ABNORMAL: Status: ACTIVE | Noted: 2019-01-10

## 2019-01-10 PROBLEM — Z3A.27 27 WEEKS GESTATION OF PREGNANCY: Status: ACTIVE | Noted: 2019-01-10

## 2019-01-10 LAB
BILIRUBIN URINE: NEGATIVE
COLOR: YELLOW
COMMENT UA: ABNORMAL
GLUCOSE URINE: NEGATIVE
KETONES, URINE: NEGATIVE
LEUKOCYTE ESTERASE, URINE: NEGATIVE
NITRITE, URINE: NEGATIVE
PH UA: 6 (ref 5–8)
PROTEIN UA: NEGATIVE
SPECIFIC GRAVITY UA: 1 (ref 1–1.03)
TURBIDITY: CLEAR
URINE HGB: NEGATIVE
UROBILINOGEN, URINE: NORMAL

## 2019-01-10 PROCEDURE — 99213 OFFICE O/P EST LOW 20 MIN: CPT

## 2019-01-10 PROCEDURE — 81003 URINALYSIS AUTO W/O SCOPE: CPT

## 2019-01-10 RX ORDER — ACETAMINOPHEN 500 MG
1000 TABLET ORAL EVERY 6 HOURS PRN
Status: DISCONTINUED | OUTPATIENT
Start: 2019-01-10 | End: 2019-01-10 | Stop reason: HOSPADM

## 2019-01-15 ENCOUNTER — ROUTINE PRENATAL (OUTPATIENT)
Dept: PERINATAL CARE | Age: 34
End: 2019-01-15
Payer: COMMERCIAL

## 2019-01-15 VITALS
HEIGHT: 63 IN | DIASTOLIC BLOOD PRESSURE: 82 MMHG | TEMPERATURE: 97.8 F | BODY MASS INDEX: 32.07 KG/M2 | RESPIRATION RATE: 16 BRPM | WEIGHT: 181 LBS | SYSTOLIC BLOOD PRESSURE: 114 MMHG | HEART RATE: 92 BPM

## 2019-01-15 DIAGNOSIS — Z3A.28 28 WEEKS GESTATION OF PREGNANCY: Primary | ICD-10-CM

## 2019-01-15 DIAGNOSIS — Z3A.28 28 WEEKS GESTATION OF PREGNANCY: ICD-10-CM

## 2019-01-15 DIAGNOSIS — O99.213 OBESITY AFFECTING PREGNANCY IN THIRD TRIMESTER: ICD-10-CM

## 2019-01-15 DIAGNOSIS — O34.219 PREVIOUS CESAREAN DELIVERY, ANTEPARTUM CONDITION OR COMPLICATION: ICD-10-CM

## 2019-01-15 DIAGNOSIS — Z36.4 ANTENATAL SCREENING FOR FETAL GROWTH RETARDATION USING ULTRASONICS: ICD-10-CM

## 2019-01-15 DIAGNOSIS — O34.593 ABNORMALITY OF UTERUS DURING PREGNANCY IN THIRD TRIMESTER: Primary | ICD-10-CM

## 2019-01-15 PROCEDURE — 76817 TRANSVAGINAL US OBSTETRIC: CPT | Performed by: OBSTETRICS & GYNECOLOGY

## 2019-01-15 PROCEDURE — 76816 OB US FOLLOW-UP PER FETUS: CPT | Performed by: OBSTETRICS & GYNECOLOGY

## 2019-01-15 PROCEDURE — 76819 FETAL BIOPHYS PROFIL W/O NST: CPT | Performed by: OBSTETRICS & GYNECOLOGY

## 2019-01-17 ENCOUNTER — TELEPHONE (OUTPATIENT)
Dept: FAMILY MEDICINE CLINIC | Age: 34
End: 2019-01-17

## 2019-01-17 PROBLEM — R10.9 ABDOMINAL PAIN: Status: ACTIVE | Noted: 2019-01-17

## 2019-01-17 PROBLEM — Z67.90 RH(D) POSITIVE: Status: ACTIVE | Noted: 2019-01-17

## 2019-01-29 ENCOUNTER — HOSPITAL ENCOUNTER (OUTPATIENT)
Age: 34
Setting detail: SPECIMEN
Discharge: HOME | End: 2019-01-29
Payer: COMMERCIAL

## 2019-01-29 ENCOUNTER — ROUTINE PRENATAL (OUTPATIENT)
Dept: OBGYN CLINIC | Age: 34
End: 2019-01-29

## 2019-01-29 VITALS
HEART RATE: 102 BPM | SYSTOLIC BLOOD PRESSURE: 130 MMHG | WEIGHT: 184 LBS | BODY MASS INDEX: 32.59 KG/M2 | DIASTOLIC BLOOD PRESSURE: 80 MMHG

## 2019-01-29 DIAGNOSIS — Z3A.25 25 WEEKS GESTATION OF PREGNANCY: Primary | ICD-10-CM

## 2019-01-29 DIAGNOSIS — Z3A.25 25 WEEKS GESTATION OF PREGNANCY: ICD-10-CM

## 2019-01-29 LAB
ABSOLUTE EOS #: 0.11 K/UL (ref 0–0.44)
ABSOLUTE IMMATURE GRANULOCYTE: 0.03 K/UL (ref 0–0.3)
ABSOLUTE LYMPH #: 1.69 K/UL (ref 1.1–3.7)
ABSOLUTE MONO #: 0.52 K/UL (ref 0.1–1.2)
BASOPHILS # BLD: 0 % (ref 0–2)
BASOPHILS ABSOLUTE: <0.03 K/UL (ref 0–0.2)
DIFFERENTIAL TYPE: ABNORMAL
EOSINOPHILS RELATIVE PERCENT: 1 % (ref 1–4)
GLUCOSE ADMINISTRATION: ABNORMAL
GLUCOSE TOLERANCE SCREEN 50G: 213 MG/DL (ref 70–135)
HCT VFR BLD CALC: 34.3 % (ref 36.3–47.1)
HEMOGLOBIN: 11.1 G/DL (ref 11.9–15.1)
IMMATURE GRANULOCYTES: 0 %
LYMPHOCYTES # BLD: 19 % (ref 24–43)
MCH RBC QN AUTO: 29.4 PG (ref 25.2–33.5)
MCHC RBC AUTO-ENTMCNC: 32.4 G/DL (ref 28.4–34.8)
MCV RBC AUTO: 91 FL (ref 82.6–102.9)
MONOCYTES # BLD: 6 % (ref 3–12)
NRBC AUTOMATED: 0 PER 100 WBC
PDW BLD-RTO: 13.3 % (ref 11.8–14.4)
PLATELET # BLD: 249 K/UL (ref 138–453)
PLATELET ESTIMATE: ABNORMAL
PMV BLD AUTO: 9.4 FL (ref 8.1–13.5)
RBC # BLD: 3.77 M/UL (ref 3.95–5.11)
RBC # BLD: ABNORMAL 10*6/UL
SEG NEUTROPHILS: 74 % (ref 36–65)
SEGMENTED NEUTROPHILS ABSOLUTE COUNT: 6.46 K/UL (ref 1.5–8.1)
WBC # BLD: 8.8 K/UL (ref 3.5–11.3)
WBC # BLD: ABNORMAL 10*3/UL

## 2019-01-29 PROCEDURE — 0502F SUBSEQUENT PRENATAL CARE: CPT | Performed by: OBSTETRICS & GYNECOLOGY

## 2019-01-30 DIAGNOSIS — O99.810 ABNORMAL GLUCOSE TOLERANCE IN PREGNANCY: Primary | ICD-10-CM

## 2019-02-01 ENCOUNTER — HOSPITAL ENCOUNTER (OUTPATIENT)
Age: 34
Discharge: HOME | End: 2019-02-01
Payer: COMMERCIAL

## 2019-02-01 DIAGNOSIS — O99.810 ABNORMAL GLUCOSE TOLERANCE IN PREGNANCY: ICD-10-CM

## 2019-02-01 LAB
3 HR GLUCOSE: 77 MG/DL (ref 65–139)
AMOUNT GLUCOSE GIVEN: 100 G
GLUCOSE FASTING: 84 MG/DL (ref 65–94)
GLUCOSE TOLERANCE TEST 1 HOUR: 163 MG/DL (ref 65–179)
GLUCOSE TOLERANCE TEST 2 HOUR: 129 MG/DL (ref 65–154)

## 2019-02-01 PROCEDURE — 82952 GTT-ADDED SAMPLES: CPT

## 2019-02-01 PROCEDURE — 82951 GLUCOSE TOLERANCE TEST (GTT): CPT

## 2019-02-01 PROCEDURE — 36415 COLL VENOUS BLD VENIPUNCTURE: CPT

## 2019-02-12 ENCOUNTER — ROUTINE PRENATAL (OUTPATIENT)
Dept: OBGYN CLINIC | Age: 34
End: 2019-02-12

## 2019-02-12 VITALS
DIASTOLIC BLOOD PRESSURE: 80 MMHG | SYSTOLIC BLOOD PRESSURE: 127 MMHG | BODY MASS INDEX: 32.42 KG/M2 | WEIGHT: 183 LBS | HEART RATE: 134 BPM

## 2019-02-12 DIAGNOSIS — Z34.93 PRENATAL CARE IN THIRD TRIMESTER: Primary | ICD-10-CM

## 2019-02-12 PROCEDURE — 0502F SUBSEQUENT PRENATAL CARE: CPT | Performed by: OBSTETRICS & GYNECOLOGY

## 2019-02-27 ENCOUNTER — ROUTINE PRENATAL (OUTPATIENT)
Dept: OBGYN CLINIC | Age: 34
End: 2019-02-27
Payer: COMMERCIAL

## 2019-02-27 VITALS
HEART RATE: 98 BPM | BODY MASS INDEX: 32.59 KG/M2 | WEIGHT: 184 LBS | SYSTOLIC BLOOD PRESSURE: 122 MMHG | DIASTOLIC BLOOD PRESSURE: 85 MMHG

## 2019-02-27 DIAGNOSIS — Z23 NEED FOR TDAP VACCINATION: Primary | ICD-10-CM

## 2019-02-27 PROCEDURE — 90715 TDAP VACCINE 7 YRS/> IM: CPT | Performed by: OBSTETRICS & GYNECOLOGY

## 2019-02-27 PROCEDURE — 90471 IMMUNIZATION ADMIN: CPT | Performed by: OBSTETRICS & GYNECOLOGY

## 2019-02-27 PROCEDURE — 0502F SUBSEQUENT PRENATAL CARE: CPT | Performed by: OBSTETRICS & GYNECOLOGY

## 2019-02-28 ENCOUNTER — PATIENT MESSAGE (OUTPATIENT)
Dept: OBGYN CLINIC | Age: 34
End: 2019-02-28

## 2019-03-04 ENCOUNTER — ROUTINE PRENATAL (OUTPATIENT)
Dept: OBGYN CLINIC | Age: 34
End: 2019-03-04
Payer: COMMERCIAL

## 2019-03-04 VITALS
HEART RATE: 99 BPM | BODY MASS INDEX: 32.77 KG/M2 | SYSTOLIC BLOOD PRESSURE: 130 MMHG | DIASTOLIC BLOOD PRESSURE: 89 MMHG | WEIGHT: 185 LBS

## 2019-03-04 DIAGNOSIS — O34.592 ABNORMALITY OF UTERUS DURING PREGNANCY IN SECOND TRIMESTER: Primary | ICD-10-CM

## 2019-03-04 PROCEDURE — 59025 FETAL NON-STRESS TEST: CPT | Performed by: OBSTETRICS & GYNECOLOGY

## 2019-03-04 RX ORDER — OSELTAMIVIR PHOSPHATE 75 MG/1
75 CAPSULE ORAL 2 TIMES DAILY
Qty: 10 CAPSULE | Refills: 0 | Status: SHIPPED | OUTPATIENT
Start: 2019-03-04 | End: 2019-03-09

## 2019-03-12 ENCOUNTER — ROUTINE PRENATAL (OUTPATIENT)
Dept: PERINATAL CARE | Age: 34
End: 2019-03-12
Payer: COMMERCIAL

## 2019-03-12 VITALS
RESPIRATION RATE: 20 BRPM | TEMPERATURE: 97.9 F | HEART RATE: 96 BPM | WEIGHT: 186 LBS | HEIGHT: 63 IN | DIASTOLIC BLOOD PRESSURE: 80 MMHG | BODY MASS INDEX: 32.96 KG/M2 | SYSTOLIC BLOOD PRESSURE: 118 MMHG

## 2019-03-12 DIAGNOSIS — O99.810 ABNORMAL MATERNAL GLUCOSE TOLERANCE, ANTEPARTUM: ICD-10-CM

## 2019-03-12 DIAGNOSIS — O34.593 ABNORMALITY OF UTERUS DURING PREGNANCY IN THIRD TRIMESTER: Primary | ICD-10-CM

## 2019-03-12 DIAGNOSIS — Z3A.36 36 WEEKS GESTATION OF PREGNANCY: ICD-10-CM

## 2019-03-12 DIAGNOSIS — Z36.4 ANTENATAL SCREENING FOR FETAL GROWTH RETARDATION USING ULTRASONICS: ICD-10-CM

## 2019-03-12 DIAGNOSIS — O99.213 OBESITY AFFECTING PREGNANCY IN THIRD TRIMESTER: ICD-10-CM

## 2019-03-12 DIAGNOSIS — Z13.89 ENCOUNTER FOR ROUTINE SCREENING FOR MALFORMATION USING ULTRASONICS: ICD-10-CM

## 2019-03-12 DIAGNOSIS — O34.219 PREVIOUS CESAREAN DELIVERY, ANTEPARTUM CONDITION OR COMPLICATION: ICD-10-CM

## 2019-03-12 PROCEDURE — 76817 TRANSVAGINAL US OBSTETRIC: CPT | Performed by: OBSTETRICS & GYNECOLOGY

## 2019-03-12 PROCEDURE — 76819 FETAL BIOPHYS PROFIL W/O NST: CPT | Performed by: OBSTETRICS & GYNECOLOGY

## 2019-03-12 PROCEDURE — 76805 OB US >/= 14 WKS SNGL FETUS: CPT | Performed by: OBSTETRICS & GYNECOLOGY

## 2019-03-12 RX ORDER — ACETAMINOPHEN 500 MG
500 TABLET ORAL EVERY 6 HOURS PRN
Status: ON HOLD | COMMUNITY
End: 2019-04-03 | Stop reason: HOSPADM

## 2019-03-13 ENCOUNTER — HOSPITAL ENCOUNTER (OUTPATIENT)
Age: 34
Setting detail: SPECIMEN
Discharge: HOME | End: 2019-03-13
Payer: COMMERCIAL

## 2019-03-13 ENCOUNTER — ROUTINE PRENATAL (OUTPATIENT)
Dept: OBGYN CLINIC | Age: 34
End: 2019-03-13

## 2019-03-13 VITALS
SYSTOLIC BLOOD PRESSURE: 131 MMHG | BODY MASS INDEX: 32.77 KG/M2 | WEIGHT: 185 LBS | DIASTOLIC BLOOD PRESSURE: 84 MMHG | HEART RATE: 111 BPM

## 2019-03-13 DIAGNOSIS — Z3A.36 36 WEEKS GESTATION OF PREGNANCY: ICD-10-CM

## 2019-03-13 DIAGNOSIS — Z3A.36 36 WEEKS GESTATION OF PREGNANCY: Primary | ICD-10-CM

## 2019-03-13 PROCEDURE — 0502F SUBSEQUENT PRENATAL CARE: CPT | Performed by: OBSTETRICS & GYNECOLOGY

## 2019-03-16 LAB
CULTURE: NORMAL
Lab: NORMAL
SPECIMEN DESCRIPTION: NORMAL

## 2019-03-18 ENCOUNTER — TELEPHONE (OUTPATIENT)
Dept: OBGYN CLINIC | Age: 34
End: 2019-03-18

## 2019-03-19 ENCOUNTER — TELEPHONE (OUTPATIENT)
Dept: OBGYN CLINIC | Age: 34
End: 2019-03-19

## 2019-03-19 ENCOUNTER — ROUTINE PRENATAL (OUTPATIENT)
Dept: OBGYN CLINIC | Age: 34
End: 2019-03-19

## 2019-03-19 VITALS
SYSTOLIC BLOOD PRESSURE: 130 MMHG | HEART RATE: 95 BPM | DIASTOLIC BLOOD PRESSURE: 81 MMHG | WEIGHT: 187.3 LBS | BODY MASS INDEX: 33.18 KG/M2

## 2019-03-19 DIAGNOSIS — Z3A.37 37 WEEKS GESTATION OF PREGNANCY: Primary | ICD-10-CM

## 2019-03-19 PROCEDURE — 0502F SUBSEQUENT PRENATAL CARE: CPT | Performed by: OBSTETRICS & GYNECOLOGY

## 2019-03-29 ENCOUNTER — ROUTINE PRENATAL (OUTPATIENT)
Dept: OBGYN CLINIC | Age: 34
End: 2019-03-29

## 2019-03-29 VITALS
HEART RATE: 88 BPM | WEIGHT: 186 LBS | SYSTOLIC BLOOD PRESSURE: 128 MMHG | BODY MASS INDEX: 32.95 KG/M2 | DIASTOLIC BLOOD PRESSURE: 87 MMHG

## 2019-03-29 DIAGNOSIS — Z34.93 PRENATAL CARE IN THIRD TRIMESTER: Primary | ICD-10-CM

## 2019-03-29 PROCEDURE — 0502F SUBSEQUENT PRENATAL CARE: CPT | Performed by: OBSTETRICS & GYNECOLOGY

## 2019-03-31 ENCOUNTER — ANESTHESIA EVENT (OUTPATIENT)
Dept: LABOR AND DELIVERY | Age: 34
End: 2019-03-31
Payer: COMMERCIAL

## 2019-03-31 PROBLEM — O34.219 PREVIOUS CESAREAN DELIVERY, ANTEPARTUM CONDITION OR COMPLICATION: Status: RESOLVED | Noted: 2018-12-04 | Resolved: 2019-03-31

## 2019-03-31 PROBLEM — O99.212 OBESITY AFFECTING PREGNANCY IN SECOND TRIMESTER: Status: RESOLVED | Noted: 2018-12-04 | Resolved: 2019-03-31

## 2019-03-31 PROBLEM — Z98.890 H/O LAPAROSCOPY: Status: ACTIVE | Noted: 2019-03-31

## 2019-03-31 PROBLEM — R10.9 ABDOMINAL PAIN: Status: RESOLVED | Noted: 2019-01-17 | Resolved: 2019-03-31

## 2019-03-31 PROBLEM — O34.593 ABNORMALITY OF UTERUS DURING PREGNANCY IN THIRD TRIMESTER: Status: RESOLVED | Noted: 2019-01-15 | Resolved: 2019-03-31

## 2019-03-31 PROBLEM — Z34.90 NORMAL REPEAT PREGNANCY, ANTEPARTUM: Status: RESOLVED | Noted: 2018-11-08 | Resolved: 2019-03-31

## 2019-03-31 PROBLEM — Z34.81 MULTIGRAVIDA IN FIRST TRIMESTER: Status: RESOLVED | Noted: 2018-09-06 | Resolved: 2019-03-31

## 2019-03-31 PROBLEM — O34.592 ABNORMALITY OF UTERUS DURING PREGNANCY IN SECOND TRIMESTER: Status: RESOLVED | Noted: 2018-12-04 | Resolved: 2019-03-31

## 2019-03-31 RX ORDER — SODIUM CHLORIDE, SODIUM LACTATE, POTASSIUM CHLORIDE, CALCIUM CHLORIDE 600; 310; 30; 20 MG/100ML; MG/100ML; MG/100ML; MG/100ML
INJECTION, SOLUTION INTRAVENOUS CONTINUOUS
Status: DISCONTINUED | OUTPATIENT
Start: 2019-03-31 | End: 2019-04-01

## 2019-03-31 RX ORDER — TRISODIUM CITRATE DIHYDRATE AND CITRIC ACID MONOHYDRATE 500; 334 MG/5ML; MG/5ML
30 SOLUTION ORAL ONCE
Status: COMPLETED | OUTPATIENT
Start: 2019-03-31 | End: 2019-04-01

## 2019-03-31 RX ORDER — ONDANSETRON 2 MG/ML
4 INJECTION INTRAMUSCULAR; INTRAVENOUS EVERY 6 HOURS PRN
Status: DISCONTINUED | OUTPATIENT
Start: 2019-03-31 | End: 2019-04-01

## 2019-03-31 NOTE — H&P
OBSTETRICAL HISTORY [place]    Date: 3/31/2019       Time: 7:37 PM   Patient Name: Meño Paul     Patient : 1985  Room/Bed: Room/bed info not found    Admission Date/Time: No admission date for patient encounter. CC: Scheduled  section with RRS    HPI: Meño Paul is a 29 y.o. [id number] at 39w0d who presents for scheduled  section with RRS secondary to history of  x 2 with no measurable lower uterine segment noted on MFM ultrasound 18. The patient reports fetal movement is present, denies contractions, denies loss of fluid, denies vaginal bleeding. Patient denies headache, vision changes, nausea, vomiting, fever, chills, shortness of breath, chest pain, RUQ pain, abdominal pain, diarrhea, change in color/amount/odor of vaginal discharge, dysuria or, hematuria. Pregnancy is complicated by H/O CS x 2 (G1- Breech, G3- repeat), No measurable SHANKAR (MFM scan 18), H/O ectopic pregnancy (G2), Hx of Diagnostic laparoscopy w/ R salpingectomy (G2),  H/O gestational DM (G1), Diastasis recti, Marijuana use, BMI 32.3    DATING:  LMP: Patient's last menstrual period was 2018 (exact date).   Estimated Date of Delivery: 19   Based on: early ultrasound, at 6 1/7 weeks GA    PREGNANCY RISK FACTORS:  Patient Active Problem List   Diagnosis    History of gestational diabetes    History of ectopic pregnancy    History of 2  sections    Marijuana use    Multigravida in first trimester    Normal repeat pregnancy, antepartum    Declined flu shot    Diastasis recti    Abnormality of uterus during pregnancy in second trimester    Previous  delivery, antepartum condition or complication    Obesity affecting pregnancy in second trimester    No measureable lower uterine segment    Abnormality of uterus during pregnancy in third trimester    Obesity affecting pregnancy in third trimester    Abdominal pain    Rh+/RI/GBS unk        Steroids Given In This Pregnancy:  no     REVIEW OF SYSTEMS:  Constitutional: negative fever, negative chills  HEENT: negative visual disturbances, negative headaches  Respiratory: negative dyspnea, negative cough  Cardiovascular: negative chest pain,  negative palpitations  Gastrointestinal: negative abdominal pain, negative RUQ pain, negative N/V, negative diarrhea, negative constipation  Genitourinary: negative dysuria, negative vaginal discharge  Dermatological: negative rash  Hematologic: negative bruising  Immunologic/Lymphatic: negative recent illness, negative recent sick contact  Musculoskeletal: negative back pain  Neurological:  negative dizziness, negative weakness  Behavior/Psych: negative depression, negative anxiety    OBSTETRICAL HISTORY:   OB History    Para Term  AB Living   4 2 2 0 1 2   SAB TAB Ectopic Molar Multiple Live Births   0 0 1 0 0 2      # Outcome Date GA Lbr Bora/2nd Weight Sex Delivery Anes PTL Lv   4 Current            3 Term 16   7 lb 7 oz (3.374 kg) M CS-LTranv   JEANINE      Birth Comments: Planned repeat C/S   2 Ectopic 14     ECTOPIC   FD   1 Term 13   8 lb 8 oz (3.856 kg) F CS-LTranv   JEANIEN      Birth Comments: Planned PLTCS       PAST MEDICAL HISTORY:   has no past medical history on file. PAST SURGICAL HISTORY:   has a past surgical history that includes Ectopic pregnancy surgery; Dental surgery;  section; and Rozet tooth extraction. ALLERGIES:  is allergic to tape [adhesive tape]. MEDICATIONS:  Prior to Admission medications    Medication Sig Start Date End Date Taking?  Authorizing Provider   acetaminophen (TYLENOL) 500 MG tablet Take 500 mg by mouth every 6 hours as needed for Pain    Historical Provider, MD   PRENATAL  MG CAPS Take 1 tablet by mouth daily 18   MARINA Schroeder CNM       FAMILY HISTORY:  family history includes Rheum Arthritis in her father; Seizures in her brother; Stroke in her brother. SOCIAL HISTORY:   reports that she has never smoked. She has never used smokeless tobacco. She reports that she has current or past drug history. Drug: Marijuana. She reports that she does not drink alcohol.     VITALS:  Vitals:    04/01/19 0616 04/01/19 0618   BP:  (!) 129/94   Pulse:  98   Resp: 16    Temp: 97.4 °F (36.3 °C)    TempSrc: Oral          PHYSICAL EXAM:  Fetal Heart Monitor:  Baseline Heart Rate 120, moderate variability, present accelerations, absent decelerations  [place]: regular, every 5-6 minutes contractions    General appearance:  no apparent distress, alert and cooperative  Neurologic:  alert, oriented, normal speech, no focal findings or movement disorder noted  Lungs:  No increased work of breathing, good air exchange, clear to auscultation bilaterally, no crackles or wheezing  Heart:  normal S1 and S2, regular rate and rhythm and no murmur    Abdomen:  soft, gravid, non-tender, no right upper quadrant tenderness, no CVA tenderness, uterus non-tender, no signs of abruption and no signs of chorioamnionitis  Extremities:  no calf tenderness, non edematous, DTR's: normal    Pelvic Exam: not indicated    OMM Structural Exam:  Chief Complaint:  Pregnancy    Anterior/ Posterior Spinal Curves: Lumbar Lordosis -  Increased  Scoliosis (Lateral Spinal Curves): None  Assessment Tool:  T= Tenderness, A= Asymmetry, R= Restricted Motion (A=Active, P=Passive), T=Tissue Texture Changes  Region Evaluated : Severity / Specific of Major Somatic Dysfunction  M99.03 Lumbar -  Minor TART - more than BG levels -   Major Correlations with:  Genitourinary  Structural Diagnosis: Increased lumbar lordosis  Treatment Plan: Outpatient     LIMITED BEDSIDE US:  Position: Cephalic  Placental Location: right lateral placenta  Fetal Heart Tones: Present  Fetal Movement: Present  Amniotic Fluid Index/Volume: Greater than 2x2 cm vertical pocket  Estimated Fetal Weight:  8 lbs 1oz    PRENATAL LAB RESULTS:   Blood Type/Rh: A pos  Antibody Screen: negative  Hemoglobin, Hematocrit, Platelets: 12.8 / 25.8 / 306  Rubella: immune  T.  Pallidum, IgG: non-reactive   Hepatitis B Surface Antigen: non-reactive   HIV: non-reactive   Sickle Cell Screen: not done  Gonorrhea: negative  Chlamydia: negative   Urine culture: negative, date: 2018     1 hour Glucose Tolerance Test: 132  3 hour Glucose Tolerance Test: not done     Group B Strep: not done  Cystic Fibrosis Screen: not done  First Trimester Screen: not done  MSAFP/Multiple Markers: not done  Non-Invasive Prenatal Testing: not done  Anatomy US: normal anatomy, right lateral placenta, 3VC, male, now measurable lower uterine segment but still present    ASSESSMENT & PLAN:  Alhaji Odell is a 29 y.o. female [id number] at 39w0d who presents for scheduled CS with RRS    - GBS negative / Rh positive / R immune   - No indication for GBS prophylaxis   - Cat I FHT, TOCO q5-6 min   - VSS   - ATSO  Jacky Vicky   - CBC, TPall, T&S   - UDS R/B/A discussed, consent obtained and in chart   -  with RRS R/B/A discussed, consent obtained and in chart   - Circumcision desired   - BSUS: cephalic with EFW 8#1   - Ancef and bicitra ordered preoperatively     Elevated BP x 1   - Only 1 other BP elevated 120s/90s in 2019   - Pt denies any s/s PreE   - Will continue to monitor BPs closely    H/O CS x 2    - G1- Breech   - G3- repeat  section    No measurable SHANKAR (MFM scan 18)    H/O ectopic pregnancy (G2)    Hx of Diagnostic laparoscopy w/ R salpingectomy (G2)    H/O gesational DM (G1)    Diastasis recti    Marijuana use   - UDS on admission pending    BMI 32.3      Patient Active Problem List    Diagnosis Date Noted    Declined flu shot 2018     Priority: Low    Abdominal pain 2019    Rh+/RI/GBS unk 2019    Abnormality of uterus during pregnancy in third trimester 01/15/2019    Obesity affecting pregnancy in third trimester 01/15/2019    No measureable lower uterine segment 01/10/2019     Seen on MFM us 18      Abnormality of uterus during pregnancy in second trimester 2018    Previous  delivery, antepartum condition or complication     Obesity affecting pregnancy in second trimester 2018    Diastasis recti 2018    Normal repeat pregnancy, antepartum 2018    Multigravida in first trimester 2018    History of gestational diabetes 2018     Will need early 1hr gtt, will do at ob h/o      History of ectopic pregnancy 2018    History of 2  sections 2018     1st for breech 2nd rpt at [address], DESIRES REPEAT      Marijuana use 2018     Medical marijuana for back pain         Plan discussed with Dr. Michelle Barber, who is agreeable. Steroids given this admission: No    Risks, benefits, alternatives and possible complications have been discussed in detail with the patient. Admission, and post admission procedures and expectations were discussed in detail. All questions were answered.     Attending's Name: Dr. Alexis Chou DO  Ob/Gyn Resident  3/31/2019, 7:37 PM

## 2019-04-01 ENCOUNTER — ANESTHESIA (OUTPATIENT)
Dept: LABOR AND DELIVERY | Age: 34
End: 2019-04-01
Payer: COMMERCIAL

## 2019-04-01 ENCOUNTER — HOSPITAL ENCOUNTER (INPATIENT)
Age: 34
LOS: 2 days | Discharge: HOME | End: 2019-04-03
Attending: OBSTETRICS & GYNECOLOGY | Admitting: OBSTETRICS & GYNECOLOGY
Payer: COMMERCIAL

## 2019-04-01 VITALS — DIASTOLIC BLOOD PRESSURE: 89 MMHG | SYSTOLIC BLOOD PRESSURE: 116 MMHG | OXYGEN SATURATION: 99 % | TEMPERATURE: 93.1 F

## 2019-04-01 LAB
ABO/RH: NORMAL
AMPHETAMINE SCREEN URINE: NEGATIVE
ANTIBODY SCREEN: NEGATIVE
ARM BAND NUMBER: NORMAL
BARBITURATE SCREEN URINE: NEGATIVE
BENZODIAZEPINE SCREEN, URINE: NEGATIVE
BUPRENORPHINE URINE: NORMAL
CANNABINOID SCREEN URINE: NEGATIVE
COCAINE METABOLITE, URINE: NEGATIVE
EXPIRATION DATE: NORMAL
HCT VFR BLD CALC: 35.5 % (ref 36.3–47.1)
HEMOGLOBIN: 11.1 G/DL (ref 11.9–15.1)
MCH RBC QN AUTO: 28.2 PG (ref 25.2–33.5)
MCHC RBC AUTO-ENTMCNC: 31.3 G/DL (ref 28.4–34.8)
MCV RBC AUTO: 90.3 FL (ref 82.6–102.9)
MDMA URINE: NORMAL
METHADONE SCREEN, URINE: NEGATIVE
METHAMPHETAMINE, URINE: NORMAL
NRBC AUTOMATED: 0 PER 100 WBC
OPIATES, URINE: NEGATIVE
OXYCODONE SCREEN URINE: NEGATIVE
PDW BLD-RTO: 14.3 % (ref 11.8–14.4)
PHENCYCLIDINE, URINE: NEGATIVE
PLATELET # BLD: 238 K/UL (ref 138–453)
PMV BLD AUTO: 10 FL (ref 8.1–13.5)
PROPOXYPHENE, URINE: NORMAL
RBC # BLD: 3.93 M/UL (ref 3.95–5.11)
T. PALLIDUM, IGG: NONREACTIVE
TEST INFORMATION: NORMAL
TRICYCLIC ANTIDEPRESSANTS, UR: NORMAL
WBC # BLD: 10.5 K/UL (ref 3.5–11.3)

## 2019-04-01 PROCEDURE — 6360000002 HC RX W HCPCS: Performed by: STUDENT IN AN ORGANIZED HEALTH CARE EDUCATION/TRAINING PROGRAM

## 2019-04-01 PROCEDURE — 6360000002 HC RX W HCPCS: Performed by: NURSE ANESTHETIST, CERTIFIED REGISTERED

## 2019-04-01 PROCEDURE — 0UB70ZZ EXCISION OF BILATERAL FALLOPIAN TUBES, OPEN APPROACH: ICD-10-PCS | Performed by: OBSTETRICS & GYNECOLOGY

## 2019-04-01 PROCEDURE — 3609079900 HC CESAREAN SECTION: Performed by: OBSTETRICS & GYNECOLOGY

## 2019-04-01 PROCEDURE — 2580000003 HC RX 258: Performed by: STUDENT IN AN ORGANIZED HEALTH CARE EDUCATION/TRAINING PROGRAM

## 2019-04-01 PROCEDURE — 1220000000 HC SEMI PRIVATE OB R&B

## 2019-04-01 PROCEDURE — 3E033VJ INTRODUCTION OF OTHER HORMONE INTO PERIPHERAL VEIN, PERCUTANEOUS APPROACH: ICD-10-PCS | Performed by: OBSTETRICS & GYNECOLOGY

## 2019-04-01 PROCEDURE — 7100000000 HC PACU RECOVERY - FIRST 15 MIN: Performed by: OBSTETRICS & GYNECOLOGY

## 2019-04-01 PROCEDURE — 85027 COMPLETE CBC AUTOMATED: CPT

## 2019-04-01 PROCEDURE — 58611 LIGATE OVIDUCT(S) ADD-ON: CPT | Performed by: OBSTETRICS & GYNECOLOGY

## 2019-04-01 PROCEDURE — 86901 BLOOD TYPING SEROLOGIC RH(D): CPT

## 2019-04-01 PROCEDURE — 6360000002 HC RX W HCPCS

## 2019-04-01 PROCEDURE — 2500000003 HC RX 250 WO HCPCS: Performed by: NURSE ANESTHETIST, CERTIFIED REGISTERED

## 2019-04-01 PROCEDURE — 88307 TISSUE EXAM BY PATHOLOGIST: CPT

## 2019-04-01 PROCEDURE — 2709999900 HC NON-CHARGEABLE SUPPLY: Performed by: OBSTETRICS & GYNECOLOGY

## 2019-04-01 PROCEDURE — 6370000000 HC RX 637 (ALT 250 FOR IP): Performed by: STUDENT IN AN ORGANIZED HEALTH CARE EDUCATION/TRAINING PROGRAM

## 2019-04-01 PROCEDURE — 86850 RBC ANTIBODY SCREEN: CPT

## 2019-04-01 PROCEDURE — 7100000001 HC PACU RECOVERY - ADDTL 15 MIN: Performed by: OBSTETRICS & GYNECOLOGY

## 2019-04-01 PROCEDURE — 88302 TISSUE EXAM BY PATHOLOGIST: CPT

## 2019-04-01 PROCEDURE — 80307 DRUG TEST PRSMV CHEM ANLYZR: CPT

## 2019-04-01 PROCEDURE — 3700000000 HC ANESTHESIA ATTENDED CARE: Performed by: OBSTETRICS & GYNECOLOGY

## 2019-04-01 PROCEDURE — 86780 TREPONEMA PALLIDUM: CPT

## 2019-04-01 PROCEDURE — 86900 BLOOD TYPING SEROLOGIC ABO: CPT

## 2019-04-01 PROCEDURE — 3700000001 HC ADD 15 MINUTES (ANESTHESIA): Performed by: OBSTETRICS & GYNECOLOGY

## 2019-04-01 PROCEDURE — 59510 CESAREAN DELIVERY: CPT | Performed by: OBSTETRICS & GYNECOLOGY

## 2019-04-01 RX ORDER — DIPHENHYDRAMINE HYDROCHLORIDE 50 MG/ML
25 INJECTION INTRAMUSCULAR; INTRAVENOUS EVERY 6 HOURS PRN
Status: DISCONTINUED | OUTPATIENT
Start: 2019-04-01 | End: 2019-04-03 | Stop reason: HOSPADM

## 2019-04-01 RX ORDER — ONDANSETRON 2 MG/ML
INJECTION INTRAMUSCULAR; INTRAVENOUS PRN
Status: DISCONTINUED | OUTPATIENT
Start: 2019-04-01 | End: 2019-04-01 | Stop reason: SDUPTHER

## 2019-04-01 RX ORDER — KETOROLAC TROMETHAMINE 30 MG/ML
30 INJECTION, SOLUTION INTRAMUSCULAR; INTRAVENOUS EVERY 6 HOURS
Status: COMPLETED | OUTPATIENT
Start: 2019-04-01 | End: 2019-04-01

## 2019-04-01 RX ORDER — BISACODYL 10 MG
10 SUPPOSITORY, RECTAL RECTAL DAILY PRN
Status: DISCONTINUED | OUTPATIENT
Start: 2019-04-01 | End: 2019-04-03 | Stop reason: HOSPADM

## 2019-04-01 RX ORDER — LANOLIN 100 %
OINTMENT (GRAM) TOPICAL
Status: DISCONTINUED | OUTPATIENT
Start: 2019-04-01 | End: 2019-04-03 | Stop reason: HOSPADM

## 2019-04-01 RX ORDER — IBUPROFEN 800 MG/1
800 TABLET ORAL EVERY 8 HOURS SCHEDULED
Status: DISCONTINUED | OUTPATIENT
Start: 2019-04-02 | End: 2019-04-01

## 2019-04-01 RX ORDER — OXYCODONE HYDROCHLORIDE AND ACETAMINOPHEN 5; 325 MG/1; MG/1
1 TABLET ORAL EVERY 4 HOURS PRN
Status: DISCONTINUED | OUTPATIENT
Start: 2019-04-02 | End: 2019-04-03 | Stop reason: HOSPADM

## 2019-04-01 RX ORDER — BUPIVACAINE HYDROCHLORIDE 7.5 MG/ML
INJECTION, SOLUTION INTRASPINAL PRN
Status: DISCONTINUED | OUTPATIENT
Start: 2019-04-01 | End: 2019-04-01 | Stop reason: SDUPTHER

## 2019-04-01 RX ORDER — ACETAMINOPHEN 325 MG/1
650 TABLET ORAL EVERY 4 HOURS PRN
Status: DISCONTINUED | OUTPATIENT
Start: 2019-04-02 | End: 2019-04-03 | Stop reason: HOSPADM

## 2019-04-01 RX ORDER — SODIUM CHLORIDE 0.9 % (FLUSH) 0.9 %
10 SYRINGE (ML) INJECTION EVERY 12 HOURS SCHEDULED
Status: DISCONTINUED | OUTPATIENT
Start: 2019-04-01 | End: 2019-04-03 | Stop reason: HOSPADM

## 2019-04-01 RX ORDER — SODIUM CHLORIDE 0.9 % (FLUSH) 0.9 %
10 SYRINGE (ML) INJECTION PRN
Status: DISCONTINUED | OUTPATIENT
Start: 2019-04-01 | End: 2019-04-03 | Stop reason: HOSPADM

## 2019-04-01 RX ORDER — DOCUSATE SODIUM 100 MG/1
100 CAPSULE, LIQUID FILLED ORAL 2 TIMES DAILY
Status: DISCONTINUED | OUTPATIENT
Start: 2019-04-01 | End: 2019-04-01

## 2019-04-01 RX ORDER — ONDANSETRON 2 MG/ML
4 INJECTION INTRAMUSCULAR; INTRAVENOUS EVERY 6 HOURS PRN
Status: DISCONTINUED | OUTPATIENT
Start: 2019-04-01 | End: 2019-04-03 | Stop reason: HOSPADM

## 2019-04-01 RX ORDER — NALBUPHINE HCL 10 MG/ML
5 AMPUL (ML) INJECTION ONCE
Status: COMPLETED | OUTPATIENT
Start: 2019-04-01 | End: 2019-04-01

## 2019-04-01 RX ORDER — NALBUPHINE HCL 10 MG/ML
AMPUL (ML) INJECTION
Status: COMPLETED
Start: 2019-04-01 | End: 2019-04-01

## 2019-04-01 RX ORDER — SIMETHICONE 80 MG
80 TABLET,CHEWABLE ORAL EVERY 6 HOURS PRN
Status: DISCONTINUED | OUTPATIENT
Start: 2019-04-01 | End: 2019-04-03 | Stop reason: HOSPADM

## 2019-04-01 RX ORDER — KETOROLAC TROMETHAMINE 30 MG/ML
30 INJECTION, SOLUTION INTRAMUSCULAR; INTRAVENOUS EVERY 6 HOURS
Status: DISCONTINUED | OUTPATIENT
Start: 2019-04-01 | End: 2019-04-01

## 2019-04-01 RX ORDER — NALOXONE HYDROCHLORIDE 0.4 MG/ML
0.4 INJECTION, SOLUTION INTRAMUSCULAR; INTRAVENOUS; SUBCUTANEOUS PRN
Status: DISCONTINUED | OUTPATIENT
Start: 2019-04-01 | End: 2019-04-01

## 2019-04-01 RX ORDER — IBUPROFEN 800 MG/1
800 TABLET ORAL EVERY 8 HOURS SCHEDULED
Status: DISCONTINUED | OUTPATIENT
Start: 2019-04-02 | End: 2019-04-03 | Stop reason: HOSPADM

## 2019-04-01 RX ORDER — MORPHINE SULFATE 1 MG/ML
INJECTION, SOLUTION EPIDURAL; INTRATHECAL; INTRAVENOUS PRN
Status: DISCONTINUED | OUTPATIENT
Start: 2019-04-01 | End: 2019-04-01 | Stop reason: SDUPTHER

## 2019-04-01 RX ORDER — OXYCODONE HYDROCHLORIDE AND ACETAMINOPHEN 5; 325 MG/1; MG/1
2 TABLET ORAL EVERY 4 HOURS PRN
Status: DISCONTINUED | OUTPATIENT
Start: 2019-04-02 | End: 2019-04-03 | Stop reason: HOSPADM

## 2019-04-01 RX ORDER — SODIUM CHLORIDE, SODIUM LACTATE, POTASSIUM CHLORIDE, CALCIUM CHLORIDE 600; 310; 30; 20 MG/100ML; MG/100ML; MG/100ML; MG/100ML
INJECTION, SOLUTION INTRAVENOUS CONTINUOUS
Status: DISCONTINUED | OUTPATIENT
Start: 2019-04-01 | End: 2019-04-02

## 2019-04-01 RX ORDER — CALCIUM CARBONATE 200(500)MG
1000 TABLET,CHEWABLE ORAL 3 TIMES DAILY PRN
Status: DISCONTINUED | OUTPATIENT
Start: 2019-04-01 | End: 2019-04-03 | Stop reason: HOSPADM

## 2019-04-01 RX ORDER — ONDANSETRON 2 MG/ML
4 INJECTION INTRAMUSCULAR; INTRAVENOUS EVERY 6 HOURS PRN
Status: DISCONTINUED | OUTPATIENT
Start: 2019-04-01 | End: 2019-04-01

## 2019-04-01 RX ORDER — DIPHENHYDRAMINE HCL 25 MG
50 TABLET ORAL EVERY 6 HOURS PRN
Status: DISCONTINUED | OUTPATIENT
Start: 2019-04-01 | End: 2019-04-03 | Stop reason: HOSPADM

## 2019-04-01 RX ORDER — DOCUSATE SODIUM 100 MG/1
100 CAPSULE, LIQUID FILLED ORAL 2 TIMES DAILY
Status: DISCONTINUED | OUTPATIENT
Start: 2019-04-01 | End: 2019-04-03 | Stop reason: HOSPADM

## 2019-04-01 RX ADMIN — Medication 500 ML: at 08:56

## 2019-04-01 RX ADMIN — Medication 500 ML: at 08:26

## 2019-04-01 RX ADMIN — SODIUM CHLORIDE, POTASSIUM CHLORIDE, SODIUM LACTATE AND CALCIUM CHLORIDE: 600; 310; 30; 20 INJECTION, SOLUTION INTRAVENOUS at 07:56

## 2019-04-01 RX ADMIN — SODIUM CITRATE AND CITRIC ACID MONOHYDRATE 30 ML: 500; 334 SOLUTION ORAL at 07:39

## 2019-04-01 RX ADMIN — NALBUPHINE HYDROCHLORIDE 5 MG: 10 INJECTION, SOLUTION INTRAMUSCULAR; INTRAVENOUS; SUBCUTANEOUS at 09:54

## 2019-04-01 RX ADMIN — SODIUM CHLORIDE, POTASSIUM CHLORIDE, SODIUM LACTATE AND CALCIUM CHLORIDE: 600; 310; 30; 20 INJECTION, SOLUTION INTRAVENOUS at 10:50

## 2019-04-01 RX ADMIN — Medication 5 MG: at 09:54

## 2019-04-01 RX ADMIN — MORPHINE SULFATE 0.2 MG: 1 INJECTION, SOLUTION EPIDURAL; INTRATHECAL; INTRAVENOUS at 08:03

## 2019-04-01 RX ADMIN — DIPHENHYDRAMINE HYDROCHLORIDE 25 MG: 50 INJECTION INTRAMUSCULAR; INTRAVENOUS at 23:22

## 2019-04-01 RX ADMIN — PHENYLEPHRINE HYDROCHLORIDE 100 MCG: 10 INJECTION INTRAVENOUS at 08:06

## 2019-04-01 RX ADMIN — KETOROLAC TROMETHAMINE 30 MG: 30 INJECTION INTRAMUSCULAR; INTRAVENOUS at 16:59

## 2019-04-01 RX ADMIN — PHENYLEPHRINE HYDROCHLORIDE 200 MCG: 10 INJECTION INTRAVENOUS at 08:31

## 2019-04-01 RX ADMIN — Medication 2 G: at 07:39

## 2019-04-01 RX ADMIN — Medication 1 MILLI-UNITS/MIN: at 08:45

## 2019-04-01 RX ADMIN — PHENYLEPHRINE HYDROCHLORIDE 200 MCG: 10 INJECTION INTRAVENOUS at 08:10

## 2019-04-01 RX ADMIN — KETOROLAC TROMETHAMINE 30 MG: 30 INJECTION INTRAMUSCULAR; INTRAVENOUS at 10:42

## 2019-04-01 RX ADMIN — PHENYLEPHRINE HYDROCHLORIDE 100 MCG: 10 INJECTION INTRAVENOUS at 08:08

## 2019-04-01 RX ADMIN — PHENYLEPHRINE HYDROCHLORIDE 200 MCG: 10 INJECTION INTRAVENOUS at 08:20

## 2019-04-01 RX ADMIN — BUPIVACAINE HYDROCHLORIDE IN DEXTROSE 1.8 ML: 7.5 INJECTION, SOLUTION SUBARACHNOID at 08:03

## 2019-04-01 RX ADMIN — PHENYLEPHRINE HYDROCHLORIDE 200 MCG: 10 INJECTION INTRAVENOUS at 08:43

## 2019-04-01 RX ADMIN — ONDANSETRON 4 MG: 2 INJECTION, SOLUTION INTRAMUSCULAR; INTRAVENOUS at 08:29

## 2019-04-01 RX ADMIN — PHENYLEPHRINE HYDROCHLORIDE 200 MCG: 10 INJECTION INTRAVENOUS at 08:34

## 2019-04-01 RX ADMIN — PHENYLEPHRINE HYDROCHLORIDE 100 MCG: 10 INJECTION INTRAVENOUS at 08:18

## 2019-04-01 RX ADMIN — KETOROLAC TROMETHAMINE 30 MG: 30 INJECTION INTRAMUSCULAR; INTRAVENOUS at 23:21

## 2019-04-01 RX ADMIN — DOCUSATE SODIUM 100 MG: 100 CAPSULE, LIQUID FILLED ORAL at 23:21

## 2019-04-01 RX ADMIN — PHENYLEPHRINE HYDROCHLORIDE 100 MCG: 10 INJECTION INTRAVENOUS at 08:15

## 2019-04-01 RX ADMIN — PHENYLEPHRINE HYDROCHLORIDE 100 MCG: 10 INJECTION INTRAVENOUS at 08:12

## 2019-04-01 RX ADMIN — SODIUM CHLORIDE, POTASSIUM CHLORIDE, SODIUM LACTATE AND CALCIUM CHLORIDE: 600; 310; 30; 20 INJECTION, SOLUTION INTRAVENOUS at 06:32

## 2019-04-01 ASSESSMENT — PULMONARY FUNCTION TESTS
PIF_VALUE: 0
PIF_VALUE: 1
PIF_VALUE: 0
PIF_VALUE: 1
PIF_VALUE: 0
PIF_VALUE: 1
PIF_VALUE: 0
PIF_VALUE: 1
PIF_VALUE: 1
PIF_VALUE: 0
PIF_VALUE: 0
PIF_VALUE: 1
PIF_VALUE: 0
PIF_VALUE: 1
PIF_VALUE: 0
PIF_VALUE: 1
PIF_VALUE: 0
PIF_VALUE: 1
PIF_VALUE: 0
PIF_VALUE: 1
PIF_VALUE: 0
PIF_VALUE: 1
PIF_VALUE: 0
PIF_VALUE: 1
PIF_VALUE: 0
PIF_VALUE: 1
PIF_VALUE: 1
PIF_VALUE: 0
PIF_VALUE: 1
PIF_VALUE: 0
PIF_VALUE: 1
PIF_VALUE: 0
PIF_VALUE: 1
PIF_VALUE: 0

## 2019-04-01 ASSESSMENT — PAIN SCALES - GENERAL
PAINLEVEL_OUTOF10: 4
PAINLEVEL_OUTOF10: 0
PAINLEVEL_OUTOF10: 4
PAINLEVEL_OUTOF10: 0

## 2019-04-01 NOTE — ANESTHESIA POSTPROCEDURE EVALUATION
Department of Anesthesiology  Postprocedure Note    Patient: Larry Jj  MRN: [id number]  YOB: 1985  Date of evaluation: 2019  Time:  2:36 PM     Procedure Summary     Date:  19 Room / Location:  [facility] L&D OR [address] [place] [address] L&D OR    Anesthesia Start:   Anesthesia Stop:  922    Procedures:        SECTION (N/A Abdomen)      SALPINGECTOMY Diagnosis:  (repeat csection)    Surgeon:  Donavon Espino MD Responsible Provider:  Guy Comer MD    Anesthesia Type:  spinal ASA Status:  2          Anesthesia Type: spinal    Tiny Phase I: Tiny Score: 9    Tiny Phase II:      Last vitals: Reviewed and per EMR flowsheets.        Anesthesia Post Evaluation    Patient location during evaluation: PACU  Patient participation: complete - patient participated  Level of consciousness: awake and alert  Pain score: 0  Airway patency: patent  Nausea & Vomiting: no nausea and no vomiting  Complications: no  Cardiovascular status: hemodynamically stable  Respiratory status: acceptable  Hydration status: euvolemic

## 2019-04-01 NOTE — DISCHARGE SUMMARY
Obstetric Discharge Summary  [facility]    Patient Name: Mac Garcia  Patient : 1985  Primary Care Physician: No primary care provider on file. Admit Date: 2019    Principal Diagnosis: IUP at 39w1d, admitted for scheduled  section with RRS     Her pregnancy has been complicated by:   Patient Active Problem List   Diagnosis    H/O gestational diabetes    H/O ectopic pregnancy (G2)    H/O CS x 2 (G1, G3)    Marijuana use    Declined flu shot    Diastasis recti    No measureable lower uterine segment    BMI 32.3    Rh+/RI/GBS neg    H/O diagnostic laparoscopy w/ R salpingectomy? (G2)    RLTCS, RRS 19 M Apg 8/9 Wt 7#7    HRP (high risk pregnancy), unspecified trimester       Infection Present?: No  Hospital Acquired: No    Surgical Operations & Procedures:  [] Pitocin Induction of Labor  [] Pitocin Augmentation of Labor  [] Prostaglandin Induction of Labor  [] Mechanical Induction of Labor  [] Artificial Rupture of Membranes  [] Intrauterine Pressure Catheter  [] Fetal Scalp Electrode  [] Amnioinfusion  Analgesia: spinal  Delivery Type:  Delivery: See Labor and Delivery Summary   Laceration(s): Absent    Consultations: NICU and Anesthesia     Pertinent Findings & Procedures:   Mac Garcia is a 29 y.o. female [id number] at 39w0d admitted for scheduled  section with RRS secondary to H/O CS x 2 with no measurable lower uterine segment noted on 18 [facility] ultrasound; received Ancef/Bicitra preoperatively. She delivered by  without labor a Live Born infant on 19. Information for the patient's :  Elizabeth Marino [[id number]]   male  Birth Weight: 7 lb 7.9 oz (3.4 kg)      Apgars: 8 at 1 minute and 9 at 5 minutes. Postpartum course: POD#1 Hgb 9.9.  Iron supplementation will be started on discharge     Course of patient: uncomplicated    Discharge to: Home    Readmission planned: no     Recommendations on Discharge:     Medications:    Micki Boo   Home Medication Instructions GOF:[id number]    Printed on:04/03/19 5076   Medication Information                      docusate sodium (COLACE, DULCOLAX) 100 MG CAPS  Take 100 mg by mouth 2 times daily             ferrous sulfate (FE TABS) 325 (65 Fe) MG EC tablet  Take 1 tablet by mouth 2 times daily             ibuprofen (ADVIL;MOTRIN) 800 MG tablet  Take 1 tablet by mouth every 8 hours             oxyCODONE-acetaminophen (PERCOCET) 5-325 MG per tablet  Take 1 tablet by mouth every 4 hours as needed for Pain for up to 3 days. PRENATAL  MG CAPS  Take 1 tablet by mouth daily                   Activity: pelvic rest x 6 weeks, no driving on narcotics, no lifting greater than 15 lbs  Diet: regular diet  Follow up: 2 weeks     Condition on discharge: stable    Discharge date: 4/3/19    Meenakshi Tilley DO  Ob/Gyn Resident    Comments:  Home care and follow-up care were reviewed. Pelvic rest, and birth control were reviewed. Signs and symptoms of mastitis and post partum depression were reviewed. The patient is to notify her physician if any of these occur. The patient was counseled on secondary smoke risks and the increased risk of sudden infant death syndrome and respiratory problems to her baby with exposure. She was counseled on various alternate recommendations to decrease the exposure to secondary smoke to her children. Attending Physician Statement  I have discussed the care of Willy Martinez, including pertinent history and exam findings,  with the resident. I have reviewed the key elements of all parts of the encounter with the resident. I agree with the assessment, plan and orders as documented by the resident.   (GE Modifier)

## 2019-04-01 NOTE — L&D DELIVERY NOTE
Mother's Information    Labor Events     labor?:  No     Mother Delivery Information    Episiotomy:  None  Lacerations:  None  Surgical or Additional Est. Blood Loss (mL):  500 (View Only):  Edit in Flowsheets   Combined Est. Blood Loss (mL):  500        [facility], Baby Keyon Fonseca [[id number]]    Labor Events     labor?:  No   steroids?:  None  Cervical ripening date/time:     Antibiotics received during labor?:  No  Rupture Identifier:  Sac 1   Rupture date/time: 19 08:21:00   Rupture type:  Artificial=AROM  Fluid color:  Clear  Fluid odor:  None  Induction:  None  Augmentation:  None  Labor complications:  None          Anesthesia    Method:  Spinal     Assisted Delivery Details    Forceps attempted?:  No  Vacuum extractor attempted?:  No     Document Additional Attempt       Document Additional Attempt             Shoulder Dystocia    Shoulder dystocia present?:  No  Add Second Maneuver  Add Third Maneuver  Add Fourth Maneuver  Add Fifth Maneuver  Add Sixth Maneuver  Add Seventh Maneuver  Add Eighth Maneuver  Add Ninth Maneuver     [place] Presentation    Presentation:  Vertex     [place] Information    Head delivery date/time:  2019 08:22:00   Changing the 's delivery date/time could affect patient care.:     Delivery date/time:   19 0243   Delivery type:  , Low Transverse    Details:   Trial of labor?:  No    categorization:  Repeat    priority:  Scheduled   Indications for :  Prior Uterine Surgery   Skin Incision Type:  Pfannenstiel   Uterine Incision:  Low Transverse         Delivery Providers    Delivering clinician:  Donavon Espino MD   Provider Role    Shonda Marcos DO Resident      Cord    Vessels:  3 Vessels  Complications:  None  Delayed cord clamping?:  Yes  Cord clamped date/time:  2019  Cord blood disposition:  Lab  Gases sent?:  Yes  Stem cell collection (by provider):   No     Placenta    Date/time: 2019 08:25:00  Removal:  Manual Removal  Appearance:  Intact  Disposition:  Pathology     Delivery Resuscitation    Method:  Bulb Suction, Stimulation     Apgars    Living status:  Living  Apgars   1 Minute:   5 Minute:   10 Minute 15 Minute 20 Minute   Skin Color: 0  1       Heart Rate: 2  2       Reflex Irritability: 2  2       Muscle Tone: 2  2       Respiratory Effort: 2  2       Total: 8  9               Apgars Assigned By:  NICU     Skin to Skin    Skin to skin initiation date/time:     Skin to skin end date/time:     Reason skin to skin not initiated:  [place] Acuity     [place] Measurements    Weight:  3400 g       Delivery Information    Episiotomy:  None  Perineal lacerations:  None    Surgical or additional est. blood loss (mL):  500 (View Only):  Edit in Flowsheets   Combined est. blood loss (mL):  500     Other Procedures    Procedures:   Other

## 2019-04-01 NOTE — BRIEF OP NOTE
Department of Obstetrics and Gynecology  Obstetrical Brief Operative Report  [facility]    Patient: Merlin Darling   : 1985  MRN: [id number]       Acct: [de-identified]   Date of Procedure: 19    Pre-operative Diagnosis: 29 y.o. female [id number] at 36w3d     Scheduled Repeat  Section   H/O C/S x2, declined TOLAC   No measurable SHANKAR   H/O Ectopic Pregnancy (S/P Dx laparoscopy w/ RS, G2)   H/O Gestational DM (G1)   Diastasis Recti    Marijuana use   BMI 32.3    Post-operative Diagnosis: same as above; live born male infant    Procedure: repeat low transverse  section, risk reducing bilateral salpingectomy     Surgeon: Dr. Linda Yeung MD  Assistant(s): Trevor Gasca, PGY 2    Anesthesia: spinal with Duramorph    Information for the patient's :  Alexandre Calloway   male  Birth Weight: 7 lb 7.9 oz (3.4 kg)    Information for the patient's :  Migdalia Grace [[id number]]          Findings:  Live Born 7 lb 7 oz male infant in cephalic presentation with Apgars of 8 at 1 minute and 9 at five minutes, normal appearing uterus tubes and ovaries   Estimated Blood Loss: 500ml  Total IV Fluids: 1800ml  Urine output: 300ml clear urine   Drains:  nam catheter  Specimens:  placenta sent to pathology, cord blood and cord gases  Instrument and Sponge Count: Correct  Complications: none  Condition: Infant stable, transfer to NICU, Mother stable, transfer to post anesthesia recovery    See dictated operative report for full details.       Trevor Gasca DO  Ob/Gyn Resident  2019, 9:36 AM

## 2019-04-01 NOTE — FLOWSHEET NOTE
Presents ambulatory for scheduled c/section. To RR. Instructed to obtain urine specimen. Pt states she showered last night but not this morning.

## 2019-04-01 NOTE — OP NOTE
Operative Note  Department of Obstetrics and Gynecology  [facility]     Patient: Meño Paul   : 1985  MRN: [id number]       Acct: [de-identified]   PCP: No primary care provider on file. Date of Procedure: 19    Pre-operative Diagnosis: 29 y.o. female [id number] at 39w1d                Scheduled Repeat  Section              H/O C/S x2, declined TOLAC              No measurable SHANKAR              H/O Ectopic Pregnancy (S/P Dx laparoscopy w/ RS, G2)              H/O Gestational DM (G1)              Diastasis Recti               Marijuana use              BMI 32.3     Post-operative Diagnosis: same as above; live born male infant     Procedure: repeat low transverse  section, risk reducing bilateral salpingectomy     Indications: Patient is 29 y.o. [id number] at 39w1d who presented for scheduled repeat  section with risk reducing salpingectomy. Patient had a history of  section x2 with no measurable lower uterine segment and declined TOLAC. Surgeon: Dr. Ross Barrios MD  Assistant(s): Davey Garza, PGY 2     Anesthesia: spinal with Duramorph    Procedure Details   The patient was seen pre-operatively. The risks, benefits, complications, treatment options, and expected outcomes were discussed with the patient. The patient concurred with the proposed plan, giving informed consent. The patient was taken to the Operating Room, identified as Meño Paul and the procedure verified as  Delivery. A Time Out was held and the above information confirmed. After spinal anesthesia, the patient was draped and prepped in the usual sterile manner. A Pfannenstiel incision was made and carried down through the subcutaneous tissue to the fascia using scalpel. Fascial incision was made and extended transversely using kearns scissors for sharp dissection.  The fascia was  from the underlying rectus tissue superiorly and inferiorly using blunt dissection and kearns scissors. The peritoneum was identified and entered bluntly. Blunt dissection was used to take the peritoneum down sharply. Peritoneal incision was extended longitudinally with blunt stretch, bladder retractor was placed. A low transverse uterine incision was made using a new scalpel blade. Blunt stretch on the hysterotomy incision was made and the amniotomy was performed revealing clear fluid. Delivered from cephalic presentation was a Live Born male infant. The infant was suctioned, dried and the umbilical cord was clamped and cut after one minute delayed cord clamping. The infant was taken to the warmer and attended by NICU for evaluation. A second section of cord was clamped and cut and sent for gases. Cord blood was obtained for evaluation. The placenta was removed manually and spontaneously with gentle traction and appeared intact, whole and that the umbilical cord had three vessels noted. Pitocin was started. The uterine outline appeared normal. The uterus was exteriorized and cleaned of all clots and debris. The uterine incision was closed with running locked sutures of 3-0 Vicryl. A figure of eight suture was needed in the right side of the hysterotomy incision to obtain excellent hemostasis. Hemostasis was observed. The right fallopian tube was grasped and using the Enseal, the mesosalpinx was coagulated and cut in order to perform the salpingectomy. The same procedure was performed on the left side and once each salpinx was , they were passed off for pathology evaluation. Abdomen was copiously irrigated. The uterus was reintroduced into the abdominal cavity. Bilateral abdominal gutters were cleared of all clots and debris. The hysterotomy was again inspected and found to be hemostatic. Rectus muscles were inspected and found to be hemostatic. Rectus muscle were reapproximated using two interrupted sutures. The fascia was then reapproximated with running sutures of 0 Vicryl. The subcuticular space was irrigated copiously. The subcuticular space was closed with interrupted sutures. The skin was reapproximated with a 4-0 Monocryl. The skin was then cleansed and closed with dermabond. Instrument, sponge, and needle counts were correct prior the abdominal closure and at the conclusion of the case. The urine remained clear throughout the case. Ancef was given for antibiotic prophylaxis. SCDs for DVT prophylaxis remain in place for the post operative period. Dr. Miguel Sheldon was present for the entire operation. Findings:  Live Born 7 lb 7 oz male infant in cephalic presentation with Apgars of 8 at 1 minute and 9 at five minutes, normal appearing uterus tubes and ovaries   Estimated Blood Loss: 500ml  Total IV Fluids: 1800ml  Urine output: 300ml clear urine   Drains:  nam catheter  Specimens:  placenta sent to pathology, cord blood and cord gases  Instrument and Sponge Count: Correct  Complications: none  Condition: Infant stable, transfer to NICU, Mother stable, transfer to post anesthesia recovery      Janeth Zapata DO  OB/GYN Resident  4/1/2019, 9:37 AM    This dictation was performed by a resident physician and then was thoroughly reviewed by the attending prior to being signed.

## 2019-04-01 NOTE — ANESTHESIA PRE PROCEDURE
Department of Anesthesiology  Preprocedure Note       Name:  Alejandra Elias   Age:  29 y.o.  :  1985                                          MRN:  [id number]         Date:  2019      Surgeon: Og Elliott):  Jenny Simeon MD    Procedure:  SECTION (N/A Abdomen)    Medications prior to admission:   Prior to Admission medications    Medication Sig Start Date End Date Taking? Authorizing Provider   PRENATAL  MG CAPS Take 1 tablet by mouth daily 18  Yes Bibi Moser APRN - CNM   acetaminophen (TYLENOL) 500 MG tablet Take 500 mg by mouth every 6 hours as needed for Pain    Historical Provider, MD       Current medications:    Current Facility-Administered Medications   Medication Dose Route Frequency Provider Last Rate Last Dose    lactated ringers infusion   Intravenous Continuous Orin Rahul,  mL/hr at 19 0632      citric acid-sodium citrate (BICITRA) solution 30 mL  30 mL Oral Once [employer], DO        ceFAZolin (ANCEF) 2 g in dextrose 5 % 50 mL IVPB  2 g Intravenous Once Orin Rahul, DO        ondansetron (ZOFRAN) injection 4 mg  4 mg Intravenous Q6H PRN Orin Rahul, DO           Allergies: Allergies   Allergen Reactions    Tape Clarence Ditto Tape] Swelling       Problem List:    Patient Active Problem List   Diagnosis Code    H/O gestational diabetes Z80.34    H/O ectopic pregnancy (G2) Z80.59    H/O CS x 2 (G1, G3) Z98.891    Marijuana use F12.90    Declined flu shot Z23    Diastasis recti M62.08    No measureable lower uterine segment R93.89    BMI 32.3 O99.213    Rh+/RI/GBS neg Z67.90    H/O diagnostic laparoscopy w/ R salpingectomy? (G2) Z98.890       Past Medical History:  History reviewed. No pertinent past medical history.     Past Surgical History:        Procedure Laterality Date     SECTION      DENTAL SURGERY      ECTOPIC PREGNANCY SURGERY      r fallopian tube gone    WISDOM TOOTH EXTRACTION         Social History: Social History     Tobacco Use    Smoking status: Former Smoker    Smokeless tobacco: Never Used   Substance Use Topics    Alcohol use: No                                Counseling given: Not Answered      Vital Signs (Current):   Vitals:    04/01/19 0616 04/01/19 0618   BP:  (!) 129/94   Pulse:  98   Resp: 16    Temp: 97.4 °F (36.3 °C)    TempSrc: Oral                                               BP Readings from Last 3 Encounters:   04/01/19 (!) 129/94   03/29/19 128/87   03/19/19 130/81       NPO Status:                                                                                 BMI:   Wt Readings from Last 3 Encounters:   03/29/19 186 lb (84.4 kg)   03/19/19 187 lb 4.8 oz (85 kg)   03/13/19 185 lb (83.9 kg)     There is no height or weight on file to calculate BMI.    CBC:   Lab Results   Component Value Date    WBC 10.5 03/31/2019    RBC 3.93 03/31/2019    HGB 11.1 03/31/2019    HCT 35.5 03/31/2019    MCV 90.3 03/31/2019    RDW 14.3 03/31/2019     03/31/2019       CMP:   Lab Results   Component Value Date     12/13/2017    K 3.8 12/13/2017    CL 96 12/13/2017    CO2 20 12/13/2017    BUN 12 12/13/2017    CREATININE 0.77 12/13/2017    GFRAA >60 12/13/2017    LABGLOM >60 12/13/2017    GLUCOSE 213 01/29/2019    GLUCOSE 93 05/11/2018    CALCIUM 9.4 12/13/2017       POC Tests: No results for input(s): POCGLU, POCNA, POCK, POCCL, POCBUN, POCHEMO, POCHCT in the last 72 hours.     Coags: No results found for: PROTIME, INR, APTT    HCG (If Applicable):   Lab Results   Component Value Date    PREGTESTUR positive 08/06/2018    HCGQUANT 2,236 (H) 08/08/2018        ABGs: No results found for: PHART, PO2ART, MFW8FZV, IXU0ROJ, BEART, E9FQGZBW     Type & Screen (If Applicable):  No results found for: LABABO, [address]    Anesthesia Evaluation  Patient summary reviewed and Nursing notes reviewed no history of anesthetic complications:   Airway: Mallampati: II  TM distance: >3 FB   Neck ROM: full  Mouth opening: > = 3 FB Dental: normal exam         Pulmonary:Negative Pulmonary ROS breath sounds clear to auscultation                             Cardiovascular:  Exercise tolerance: good (>4 METS),       (-) valvular problems/murmurs, past MI and CAD      Rhythm: regular  Rate: normal           Beta Blocker:  Not on Beta Blocker         Neuro/Psych:   Negative Neuro/Psych ROS              GI/Hepatic/Renal: Neg GI/Hepatic/Renal ROS            Endo/Other: Negative Endo/Other ROS                    Abdominal:       Abdomen: soft. Vascular: negative vascular ROS. Anesthesia Plan      spinal     ASA 2       Induction: intravenous. MIPS: Postoperative opioids intended and Prophylactic antiemetics administered. Anesthetic plan and risks discussed with patient. Plan discussed with CRNA.     Attending anesthesiologist reviewed and agrees with 23[address] María Elena Sarmiento MD   4/1/2019

## 2019-04-02 PROBLEM — O09.90 HRP (HIGH RISK PREGNANCY), UNSPECIFIED TRIMESTER: Status: ACTIVE | Noted: 2019-04-02

## 2019-04-02 LAB
ABSOLUTE EOS #: 0.13 K/UL (ref 0–0.44)
ABSOLUTE IMMATURE GRANULOCYTE: 0.04 K/UL (ref 0–0.3)
ABSOLUTE LYMPH #: 2.66 K/UL (ref 1.1–3.7)
ABSOLUTE MONO #: 0.99 K/UL (ref 0.1–1.2)
BASOPHILS # BLD: 0 % (ref 0–2)
BASOPHILS ABSOLUTE: 0.04 K/UL (ref 0–0.2)
DIFFERENTIAL TYPE: ABNORMAL
EOSINOPHILS RELATIVE PERCENT: 1 % (ref 1–4)
HCT VFR BLD CALC: 30.9 % (ref 36.3–47.1)
HEMOGLOBIN: 9.9 G/DL (ref 11.9–15.1)
IMMATURE GRANULOCYTES: 0 %
LYMPHOCYTES # BLD: 25 % (ref 24–43)
MCH RBC QN AUTO: 28 PG (ref 25.2–33.5)
MCHC RBC AUTO-ENTMCNC: 32 G/DL (ref 28.4–34.8)
MCV RBC AUTO: 87.5 FL (ref 82.6–102.9)
MONOCYTES # BLD: 9 % (ref 3–12)
NRBC AUTOMATED: 0 PER 100 WBC
PDW BLD-RTO: 14.3 % (ref 11.8–14.4)
PLATELET # BLD: 205 K/UL (ref 138–453)
PLATELET ESTIMATE: ABNORMAL
PMV BLD AUTO: 10.3 FL (ref 8.1–13.5)
RBC # BLD: 3.53 M/UL (ref 3.95–5.11)
RBC # BLD: ABNORMAL 10*6/UL
SEG NEUTROPHILS: 65 % (ref 36–65)
SEGMENTED NEUTROPHILS ABSOLUTE COUNT: 6.63 K/UL (ref 1.5–8.1)
SURGICAL PATHOLOGY REPORT: NORMAL
WBC # BLD: 10.5 K/UL (ref 3.5–11.3)
WBC # BLD: ABNORMAL 10*3/UL

## 2019-04-02 PROCEDURE — 1220000000 HC SEMI PRIVATE OB R&B

## 2019-04-02 PROCEDURE — 6370000000 HC RX 637 (ALT 250 FOR IP): Performed by: STUDENT IN AN ORGANIZED HEALTH CARE EDUCATION/TRAINING PROGRAM

## 2019-04-02 PROCEDURE — 36415 COLL VENOUS BLD VENIPUNCTURE: CPT

## 2019-04-02 PROCEDURE — 85025 COMPLETE CBC W/AUTO DIFF WBC: CPT

## 2019-04-02 RX ADMIN — OXYCODONE HYDROCHLORIDE AND ACETAMINOPHEN 1 TABLET: 5; 325 TABLET ORAL at 18:01

## 2019-04-02 RX ADMIN — OXYCODONE HYDROCHLORIDE AND ACETAMINOPHEN 1 TABLET: 5; 325 TABLET ORAL at 08:53

## 2019-04-02 RX ADMIN — IBUPROFEN 800 MG: 800 TABLET, FILM COATED ORAL at 21:15

## 2019-04-02 RX ADMIN — IBUPROFEN 800 MG: 800 TABLET, FILM COATED ORAL at 04:50

## 2019-04-02 RX ADMIN — IBUPROFEN 800 MG: 800 TABLET, FILM COATED ORAL at 12:59

## 2019-04-02 RX ADMIN — OXYCODONE HYDROCHLORIDE AND ACETAMINOPHEN 1 TABLET: 5; 325 TABLET ORAL at 12:59

## 2019-04-02 RX ADMIN — OXYCODONE HYDROCHLORIDE AND ACETAMINOPHEN 1 TABLET: 5; 325 TABLET ORAL at 04:02

## 2019-04-02 RX ADMIN — DOCUSATE SODIUM 100 MG: 100 CAPSULE, LIQUID FILLED ORAL at 08:53

## 2019-04-02 RX ADMIN — DOCUSATE SODIUM 100 MG: 100 CAPSULE, LIQUID FILLED ORAL at 21:16

## 2019-04-02 RX ADMIN — OXYCODONE HYDROCHLORIDE AND ACETAMINOPHEN 1 TABLET: 5; 325 TABLET ORAL at 22:03

## 2019-04-02 ASSESSMENT — PAIN SCALES - GENERAL
PAINLEVEL_OUTOF10: 5
PAINLEVEL_OUTOF10: 4
PAINLEVEL_OUTOF10: 4
PAINLEVEL_OUTOF10: 5
PAINLEVEL_OUTOF10: 4

## 2019-04-02 ASSESSMENT — PAIN DESCRIPTION - DESCRIPTORS: DESCRIPTORS: BURNING

## 2019-04-02 ASSESSMENT — PAIN DESCRIPTION - PAIN TYPE: TYPE: ACUTE PAIN;SURGICAL PAIN

## 2019-04-02 ASSESSMENT — PAIN DESCRIPTION - LOCATION: LOCATION: ABDOMEN

## 2019-04-02 NOTE — CONSULTS
Mom reports that nursing is going fairly well,  Just finished nursing about 20 minutes. Baby asleep on mom's chest.  Mom using Akua on phone to track feedings. Encouraged mom to call for latch check with next feed.

## 2019-04-02 NOTE — PROGRESS NOTES
POST OPERATIVE DAY # 1    Saira Friend is a 29 y.o. female   This patient was seen and examined today. She is s/p RLTCS with RRS on 4/1/19. Her pregnancy was complicated by:   Patient Active Problem List   Diagnosis    H/O gestational diabetes    H/O ectopic pregnancy (G2)    H/O CS x 2 (G1, G3)    Marijuana use    Declined flu shot    Diastasis recti    No measureable lower uterine segment    BMI 32.3    Rh+/RI/GBS neg    H/O diagnostic laparoscopy w/ R salpingectomy? (G2)    RLTCS, RRS 4/1/19 M Apg 8/9 Wt 7#7       Today she is doing well without any chief complaint. Her lochia is light. She denies chest pain, shortness of breath, headache, lightheadedness and blurred vision. She is  breast feeding and she denies any signs or symptoms of mastitis. She is ambulating well. Santizo catheter removed, patient has not yet voided. She currently denies S/S of postpartum depression. Flatus absent. Bowel movement absent. She is tolerating solids.     Vital Signs:  Vitals:    04/01/19 1130 04/01/19 1612 04/01/19 2000 04/01/19 2330   BP: 106/74 120/86 121/85 115/74   Pulse: 77 80 90 81   Resp: 18 18 18 16   Temp: 97.7 °F (36.5 °C) 98.2 °F (36.8 °C) 97.7 °F (36.5 °C) 97.9 °F (36.6 °C)   TempSrc:   Oral Oral   SpO2:  98% 98% 100%   Weight:       Height:           Urine Input & Output last 24hrs:     Intake/Output Summary (Last 24 hours) at 4/2/2019 0303  Last data filed at 4/2/2019 0000  Gross per 24 hour   Intake 3715 ml   Output 4000 ml   Net -285 ml       Physical Exam:  General:  no apparent distress, alert and cooperative  Neurologic:  alert, oriented, normal speech, no focal findings or movement disorder noted  Lungs:  No increased work of breathing, good air exchange, clear to auscultation bilaterally, no crackles or wheezing  Heart:  regular rate and rhythm    Abdomen: abdomen soft, non-distended, appropriately tender to palpation  Fundus: non-tender, normal size, firm, below umbilicus  Incision: clean, dry and intact  Extremities:  no calf tenderness, non edematous    Labs:  Lab Results   Component Value Date    WBC 10.5 03/31/2019    HGB 11.1 (L) 03/31/2019    HCT 35.5 (L) 03/31/2019    MCV 90.3 03/31/2019     03/31/2019       Assessment/Plan:  1. Hayden Cox is a [id number] POD # 1 s/p RLTCS with RRS on 4/1/19    - Doing well, VSS    - Male infant in NICU, circumcision desired    - Encourage ambulation and use of incentive spirometer   - D/C nam catheter and saline lock IV on POD #1    - CBC awaiting  2. Rh positive/Rubella immune  3. Breast feeding   - Denies any signs or symptoms of mastitis    4. Marijuana Abuse   - Cessation encouraged   5. Continue post-op care. Counseling Completed:  Secondary Smoke risks and Sudden Infant Death Syndrome were reviewed with recommendations. Infant sleeping, \"back to sleep\" and avoidance of co-sleeping recommendations were reviewed. Signs and Symptoms of Post Partum Depression were reviewed. The patient is to call if any occur. Signs and symptoms of Mastitis were reviewed. The patient is to call if any occur for follow up.   Discharge instructions including pelvic rest, incision care, 15 lb weight restriction, no driving with pain medicine and office follow-up were reviewed with patient     Providers Name: Dr. Neisha Schaffer DO  Ob/Gyn Resident  4/2/2019, 3:03 AM

## 2019-04-02 NOTE — LACTATION NOTE
Baby bottle fed all night. Mom has continued to pump getting few drops,   Baby appears sleepy, but observed mom place baby to left breast in football hold with shield in place. Baby holds in mouth with no attempt at sucking even when formula put in shield. Mom to do STS with baby for 30 minutes and call for assistance.  Mom desires discharge home today

## 2019-04-03 VITALS
BODY MASS INDEX: 32.96 KG/M2 | SYSTOLIC BLOOD PRESSURE: 107 MMHG | HEART RATE: 89 BPM | RESPIRATION RATE: 17 BRPM | OXYGEN SATURATION: 100 % | DIASTOLIC BLOOD PRESSURE: 71 MMHG | HEIGHT: 63 IN | WEIGHT: 186 LBS | TEMPERATURE: 98.2 F

## 2019-04-03 PROCEDURE — 6370000000 HC RX 637 (ALT 250 FOR IP): Performed by: STUDENT IN AN ORGANIZED HEALTH CARE EDUCATION/TRAINING PROGRAM

## 2019-04-03 PROCEDURE — 99024 POSTOP FOLLOW-UP VISIT: CPT | Performed by: OBSTETRICS & GYNECOLOGY

## 2019-04-03 RX ORDER — PSEUDOEPHEDRINE HCL 30 MG
100 TABLET ORAL 2 TIMES DAILY
Qty: 60 CAPSULE | Refills: 0 | Status: SHIPPED | OUTPATIENT
Start: 2019-04-03 | End: 2019-05-10 | Stop reason: ALTCHOICE

## 2019-04-03 RX ORDER — OXYCODONE HYDROCHLORIDE AND ACETAMINOPHEN 5; 325 MG/1; MG/1
1 TABLET ORAL EVERY 4 HOURS PRN
Qty: 24 TABLET | Refills: 0 | Status: SHIPPED | OUTPATIENT
Start: 2019-04-03 | End: 2019-04-06

## 2019-04-03 RX ORDER — IBUPROFEN 800 MG/1
800 TABLET ORAL EVERY 8 HOURS SCHEDULED
Qty: 28 TABLET | Refills: 3 | Status: SHIPPED | OUTPATIENT
Start: 2019-04-03 | End: 2019-05-10 | Stop reason: ALTCHOICE

## 2019-04-03 RX ORDER — LANOLIN ALCOHOL/MO/W.PET/CERES
325 CREAM (GRAM) TOPICAL 2 TIMES DAILY
Qty: 90 TABLET | Refills: 1 | Status: SHIPPED | OUTPATIENT
Start: 2019-04-03 | End: 2019-05-10 | Stop reason: ALTCHOICE

## 2019-04-03 RX ADMIN — OXYCODONE HYDROCHLORIDE AND ACETAMINOPHEN 2 TABLET: 5; 325 TABLET ORAL at 07:57

## 2019-04-03 RX ADMIN — IBUPROFEN 800 MG: 800 TABLET, FILM COATED ORAL at 05:37

## 2019-04-03 RX ADMIN — DOCUSATE SODIUM 100 MG: 100 CAPSULE, LIQUID FILLED ORAL at 07:56

## 2019-04-03 RX ADMIN — OXYCODONE HYDROCHLORIDE AND ACETAMINOPHEN 1 TABLET: 5; 325 TABLET ORAL at 02:14

## 2019-04-03 ASSESSMENT — PAIN SCALES - GENERAL
PAINLEVEL_OUTOF10: 3
PAINLEVEL_OUTOF10: 7
PAINLEVEL_OUTOF10: 4

## 2019-04-03 ASSESSMENT — PAIN DESCRIPTION - DESCRIPTORS
DESCRIPTORS: ACHING;CRAMPING;DISCOMFORT
DESCRIPTORS: ACHING;CRAMPING;DISCOMFORT

## 2019-04-03 ASSESSMENT — PAIN DESCRIPTION - PAIN TYPE
TYPE: SURGICAL PAIN
TYPE: SURGICAL PAIN

## 2019-04-03 ASSESSMENT — PAIN DESCRIPTION - LOCATION
LOCATION: ABDOMEN
LOCATION: ABDOMEN

## 2019-04-03 NOTE — PROGRESS NOTES
POST OPERATIVE DAY # 2    Yamilka Valdivia is a 29 y.o. female   This patient was seen and examined today. She is s/p RLTCS with RRS on 4/1/19. Her pregnancy was complicated by:   Patient Active Problem List   Diagnosis    H/O gestational diabetes    H/O ectopic pregnancy (G2)    H/O CS x 2 (G1, G3)    Marijuana use    Declined flu shot    Diastasis recti    No measureable lower uterine segment    BMI 32.3    Rh+/RI/GBS neg    H/O diagnostic laparoscopy w/ R salpingectomy? (G2)    RLTCS, RRS 4/1/19 M Apg 8/9 Wt 7#7    HRP (high risk pregnancy), unspecified trimester       Today she is doing well without any chief complaint. She states her pain is well controlled with medication. Her lochia is light. She denies chest pain, shortness of breath, headache, lightheadedness and blurred vision. She is breast feeding and she denies any signs or symptoms of mastitis. She is ambulating well. She is voiding without difficulty. She currently denies S/S of postpartum depression. Flatus absent. Bowel movement absent. She is tolerating solids.     Vital Signs:  Vitals:    04/02/19 1207 04/02/19 1615 04/02/19 2100 04/03/19 0015   BP: 116/84 128/86 (!) 140/85 111/73   Pulse: 85 92 86 81   Resp: 18 18 16 16   Temp: 97.5 °F (36.4 °C) 98 °F (36.7 °C) 98 °F (36.7 °C) 98.5 °F (36.9 °C)   TempSrc:   Oral Oral   SpO2:       Weight:       Height:           Urine Input & Output last 24hrs:     Intake/Output Summary (Last 24 hours) at 4/3/2019 0320  Last data filed at 4/2/2019 1993  Gross per 24 hour   Intake 1222 ml   Output 4275 ml   Net -3053 ml       Physical Exam:  General:  no apparent distress, alert and cooperative  Neurologic:  alert, oriented, normal speech, no focal findings or movement disorder noted  Lungs:  No increased work of breathing, good air exchange, clear to auscultation bilaterally, no crackles or wheezing  Heart:  regular rate and rhythm    Abdomen: abdomen soft, non-distended, appropriately tender to Modifier)

## 2019-04-03 NOTE — CARE COORDINATION
Social Work    Sw met with mom to provide support and do a general assessment. Consult was ordered due to history of positive THC use (per medical record medical marijuana use for back pain, noted 08/06/18). Sw reviewed medical record and drug screens; no + screens or date of THC usage found. Fob was present in room. Mom denied any S/s of anxiety or depression. Mom reported her support system including her family and friends. Fob is involved. Mom currently resides with fob, her daughter, and her son (ages 10 & 2), has all baby items, and denies being linked to any community supports. Mom denied having any questions or concerns.     Eddie Rodriguez, Social Work Intern

## 2019-04-03 NOTE — LACTATION NOTE
Parents ready to discharge home with Sarah Casas, mom nursing baby on left breast in cradle hold, deep latch, sustained sucking noted. Reviewed feeding, sleep and elimination patterns for first week. Mom expressing concern that she has \" plugged duct on left breast near top of areola, outer aspect. No appreciable swelling noted. Advised mom most likely that breast are filling as mature milk comes in. Advised massage prior to feeding, make sure latch is deep and nurse frequently. Reviewed different ways to position baby for feeds. Encouraged mom to call with questions or for [facility] visit as needed. Dad supportive at bedside.

## 2019-04-04 ENCOUNTER — TELEPHONE (OUTPATIENT)
Dept: OBGYN CLINIC | Age: 34
End: 2019-04-04

## 2019-04-04 NOTE — TELEPHONE ENCOUNTER
Jannette Mcneal called and stated that she is nauseated and would like Zofran called in everything else is fine but that has follow up appt 04/10/19 with  So you did not have any appointments available next week

## 2019-04-12 ENCOUNTER — POSTPARTUM VISIT (OUTPATIENT)
Dept: OBGYN CLINIC | Age: 34
End: 2019-04-12

## 2019-04-12 VITALS
HEART RATE: 82 BPM | DIASTOLIC BLOOD PRESSURE: 80 MMHG | HEIGHT: 63 IN | BODY MASS INDEX: 30.25 KG/M2 | SYSTOLIC BLOOD PRESSURE: 128 MMHG | WEIGHT: 170.7 LBS

## 2019-04-12 PROCEDURE — 0503F POSTPARTUM CARE VISIT: CPT | Performed by: OBSTETRICS & GYNECOLOGY

## 2019-04-12 NOTE — PROGRESS NOTES
Postpartum Visit    Myles Sosa is a 29 y.o. female [id number], 2 weeks Post Partum s/p RLTCS RRS on 19    The patient was seen. She reports issues with constipation. States she is still taking the prescribed colace but has not tried anything else. She also is concerned for possible clogged milk duct on the left side. Denies fever, chills, or breast redness/warmth. Her pregnancy was complicated by two prior cesareans and no measurable SHANKAR. She is  breast feeding and denies signs or symptoms of mastitis. The patient completed the E.P.D.S. Post Partum Depression Evaluation form and scored 12; however, patient reports this is due to her father recently passing away. She does not have signs or symptoms of post partum depression. She denies any suicidal thoughts with a plan, intent to harm others and delusional ideas. She does admit to having good home support. Today her lochia is light she denies any dizziness or shortness of breath. She denies any urinary tract symptomology. OB History    Para Term  AB Living   4 3 3 0 1 3   SAB TAB Ectopic Molar Multiple Live Births   0 0 1 0 0 3     Patient Active Problem List   Diagnosis    H/O gestational diabetes    H/O ectopic pregnancy (G2)    H/O CS x 2 (G1, G3)    Marijuana use    Declined flu shot    Diastasis recti    No measureable lower uterine segment    BMI 32.3    Rh+/RI/GBS neg    H/O diagnostic laparoscopy w/ R salpingectomy? (G2)    RLTCS, RRS 19 M Apg 8/9 Wt 7#7    HRP (high risk pregnancy), unspecified trimester       Vitals:   Blood pressure 128/80, pulse 82, height 5' 3\" (1.6 m), weight 170 lb 11.2 oz (77.4 kg), last menstrual period 2018, currently breastfeeding.     Physical Exam:  General:  no apparent distress, alert and cooperative  Breast: normal appearance, no masses or tenderness, No nipple retraction or dimpling, No nipple discharge or bleeding, No axillary or supraclavicular adenopathy, possible clogged duct on left breast at 4 o'clock  Lungs:  No increased work of breathing, good air exchange, clear to auscultation bilaterally, no crackles or wheezing  Heart:  regular rate and rhythm and no murmur    Abdomen: Abdomen soft, non-tender. BS normal. No masses,  No organomegaly  Fundus: non-tender, normal size, firm, below umbilicus  Incision: clean, dry, intact and well healing  Extremities:  no calf tenderness, non edematous    Assessment:  Meron Moses is a 29 y.o. female [id number], 2 weeks Post Partum s/p RLTCS RRS on 4/1/19   - Stable, continue routine post partum care   - discussed warm compresses for clogged milk duct as well as continued use of NSAIDs   - encouraged increased po hydration and recommended OTC miralax or benefiber    Patient Active Problem List    Diagnosis Date Noted    Declined flu shot 11/08/2018     Priority: Low    HRP (high risk pregnancy), unspecified trimester 04/02/2019    RLTCS, RRS 4/1/19 M Apg 8/9 Wt 7#7 04/01/2019    H/O diagnostic laparoscopy w/ R salpingectomy? (G2) 03/31/2019    Rh+/RI/GBS neg 01/17/2019    BMI 32.3 01/15/2019    No measureable lower uterine segment 01/10/2019     Seen on MFM us 12/4/18      Diastasis recti 11/28/2018    H/O gestational diabetes 08/06/2018     Will need early 1hr gtt, will do at ob h/o      H/O ectopic pregnancy (G2) 08/06/2018    H/O CS x 2 (G1, G3) 08/06/2018     [facility]      Marijuana use 08/06/2018     Medical marijuana for back pain       Return in about 1 month (around 5/10/2019) for 6wk postpartum. Counseling Completed:  · Signs & Symptoms of mastitis and when to notify office done.   · Secondary smoke risks including increased risks of respiratory problems, Sudden infant death syndrome, and potential malignancies done  · Abstinence, family planning counseling and STD counseling done  · Continue with post operative restrictions until 6 weeks post partum  · No heavy lifting or [place] until 6 weeks post partum    DO [facility] [place]  4/12/2019 10:12 AM

## 2019-04-12 NOTE — PATIENT INSTRUCTIONS
can drive again.     · You will probably need to take at least 6 weeks off work. It depends on the type of work you do and how you feel.     · Ask your doctor when it is okay for you to have sex. Diet    · You can eat your normal diet. If your stomach is upset, try bland, low-fat foods like plain rice, broiled chicken, toast, and yogurt.     · Drink plenty of fluids (unless your doctor tells you not to).     · You may notice that your bowel movements are not regular right after your surgery. This is common. Try to avoid constipation and straining with bowel movements. You may want to take a fiber supplement every day. If you have not had a bowel movement after a couple of days, ask your doctor about taking a mild laxative.     · If you are breastfeeding, limit alcohol. Alcohol can cause a lack of energy and other health problems for the baby when a breastfeeding woman drinks heavily. It can also get in the way of a mom's ability to feed her baby or to care for the child in other ways. There isn't a lot of research about exactly how much alcohol can harm a baby. Having no alcohol is the safest choice for your baby. If you choose to have a drink now and then, have only one drink, and limit the number of occasions that you have a drink. Wait to breastfeed at least 2 hours after you have a drink to reduce the amount of alcohol the baby may get in the milk. Medicines    · Your doctor will tell you if and when you can restart your medicines. He or she will also give you instructions about taking any new medicines.     · If you take blood thinners, such as warfarin (Coumadin), clopidogrel (Plavix), or aspirin, be sure to talk to your doctor. He or she will tell you if and when to start taking those medicines again. Make sure that you understand exactly what your doctor wants you to do.     · Take pain medicines exactly as directed. ? If the doctor gave you a prescription medicine for pain, take it as prescribed. ?  If vaginal bleeding.     · You are dizzy or lightheaded, or you feel like you may faint.     · You have new or worse pain in your belly or pelvis.     · You have loose stitches, or your incision comes open.     · You have symptoms of infection, such as:  ? Increased pain, swelling, warmth, or redness. ? Red streaks leading from the incision. ? Pus draining from the incision. ? A fever.     · You have symptoms of a blood clot in your leg (called a deep vein thrombosis), such as:  ? Pain in your calf, back of the knee, thigh, or groin. ? Redness and swelling in your leg or groin.    Watch closely for changes in your health, and be sure to contact your doctor if:    · You do not get better as expected. Where can you learn more? Go to https://Interact.iopekeaganeweb.ThemBid. org and sign in to your iKoa account. Enter M806 in the [employer] box to learn more about \" Section: What to Expect at Home. \"     If you do not have an account, please click on the \"Sign Up Now\" link. Current as of: 2018  Content Version: 11.9  © 7803-3412 [employer]. Care instructions adapted under license by [facility] ([facility]). If you have questions about a medical condition or this instruction, always ask your healthcare professional. [address] any warranty or liability for your use of this information.

## 2019-04-19 ENCOUNTER — TELEPHONE (OUTPATIENT)
Dept: OBGYN CLINIC | Age: 34
End: 2019-04-19

## 2019-05-10 ENCOUNTER — POSTPARTUM VISIT (OUTPATIENT)
Dept: OBGYN CLINIC | Age: 34
End: 2019-05-10

## 2019-05-10 VITALS
WEIGHT: 165 LBS | HEART RATE: 102 BPM | SYSTOLIC BLOOD PRESSURE: 128 MMHG | HEIGHT: 63 IN | DIASTOLIC BLOOD PRESSURE: 80 MMHG | BODY MASS INDEX: 29.23 KG/M2

## 2019-05-10 PROCEDURE — 0503F POSTPARTUM CARE VISIT: CPT | Performed by: OBSTETRICS & GYNECOLOGY

## 2019-05-10 NOTE — PROGRESS NOTES
[facility] PHYSICIANS  [facility] - [address] [address]  Dept: [phone]    Bernice Delgadillo  11:06 AM  5/10/19        The patient was seen. She has no chief complaints today. She delivered by  section on RLTCS with RRS. She is  breast feeding and there is not any signs or symptoms of mastitis. She does not have any signs or symptoms of post partum depression. She stopped bleeding 2-3 weeks after delivery, but had a period last week. She is doing well other than being busy with 3 kids now. Her pregnancy was complicated by:   Patient Active Problem List    Diagnosis Date Noted    Declined flu shot 2018     Priority: Low    HRP (high risk pregnancy), unspecified trimester 2019    RLTCS, RRS 19 M Apg 8/9 Wt 7#7 2019    H/O diagnostic laparoscopy w/ R salpingectomy? (G2) 2019    Rh+/RI/GBS neg 2019    BMI 32.3 01/15/2019    No measureable lower uterine segment 01/10/2019     Overview Note:     Seen on MFM us 18      Diastasis recti 2018    H/O gestational diabetes 2018     Overview Note:     Will need early 1hr gtt, will do at ob h/o      H/O ectopic pregnancy (G2) 2018    H/O CS x 2 (G1, G3) 2018     Overview Note:     1st for breech [address]nd rpt [address], [facility]      Marijuana use 2018     Overview Note:     Medical marijuana for back pain         She does admit to having good home support. Her bowels are regular and she denies any urinary tract symptomology. OB History    Para Term  AB Living   4 3 3 0 1 3   SAB TAB Ectopic Molar Multiple Live Births   0 0 1 0 0 3           Blood pressure 128/80, pulse 102, height 5' 3\" (1.6 m), weight 165 lb (74.8 kg), last menstrual period 2018, currently breastfeeding. Abdomen: Soft and non-tender; good bowel sounds; no guarding, rebound or rigidity; no CVA tenderness bilaterally.     Incision: Clean, Dry and Intact without signs or symptoms of infection. Extremities: No calf tenderness bilaterally. DTR 2/4 bilaterally. No edema. Assessment:   Diagnosis Orders   1. Postpartum care following  delivery       Chief Complaint   Patient presents with    Postpartum Care       Plan:  1. Signs & Symptoms of mastitis reviewed; notify if occurs  2. Secondary smoke risks reviewed. Increased risks of respiratory problems, Sudden infant death syndrome, and potential malignancies.   3. Family planning counseling and STD counseling completed      Willow Moreland MD

## 2019-06-18 ENCOUNTER — EMPLOYEE WELLNESS (OUTPATIENT)
Dept: OTHER | Age: 34
End: 2019-06-18

## 2019-06-18 LAB
CHOLESTEROL/HDL RATIO: 2.7
CHOLESTEROL: 223 MG/DL
GLUCOSE BLD-MCNC: 91 MG/DL (ref 70–99)
HDLC SERPL-MCNC: 83 MG/DL
LDL CHOLESTEROL: 125 MG/DL (ref 0–130)
PATIENT FASTING?: YES
TRIGL SERPL-MCNC: 75 MG/DL
VLDLC SERPL CALC-MCNC: ABNORMAL MG/DL (ref 1–30)

## 2019-06-24 VITALS — WEIGHT: 166 LBS | BODY MASS INDEX: 29.41 KG/M2

## 2019-08-27 ENCOUNTER — PATIENT MESSAGE (OUTPATIENT)
Dept: OBGYN CLINIC | Age: 34
End: 2019-08-27

## 2019-09-03 ENCOUNTER — OFFICE VISIT (OUTPATIENT)
Dept: FAMILY MEDICINE CLINIC | Age: 34
End: 2019-09-03
Payer: COMMERCIAL

## 2019-09-03 VITALS
WEIGHT: 177.4 LBS | OXYGEN SATURATION: 97 % | BODY MASS INDEX: 31.43 KG/M2 | SYSTOLIC BLOOD PRESSURE: 133 MMHG | HEIGHT: 63 IN | HEART RATE: 72 BPM | DIASTOLIC BLOOD PRESSURE: 95 MMHG

## 2019-09-03 DIAGNOSIS — M54.50 LUMBAR PAIN: ICD-10-CM

## 2019-09-03 DIAGNOSIS — K42.9 UMBILICAL HERNIA WITHOUT OBSTRUCTION AND WITHOUT GANGRENE: Primary | ICD-10-CM

## 2019-09-03 DIAGNOSIS — M54.6 ACUTE BILATERAL THORACIC BACK PAIN: ICD-10-CM

## 2019-09-03 PROCEDURE — 99204 OFFICE O/P NEW MOD 45 MIN: CPT | Performed by: FAMILY MEDICINE

## 2019-09-03 ASSESSMENT — PATIENT HEALTH QUESTIONNAIRE - PHQ9
SUM OF ALL RESPONSES TO PHQ QUESTIONS 1-9: 0
SUM OF ALL RESPONSES TO PHQ9 QUESTIONS 1 & 2: 0
SUM OF ALL RESPONSES TO PHQ QUESTIONS 1-9: 0
1. LITTLE INTEREST OR PLEASURE IN DOING THINGS: 0
2. FEELING DOWN, DEPRESSED OR HOPELESS: 0

## 2019-09-03 NOTE — PROGRESS NOTES
membrane is not erythematous. No middle ear effusion. Nose: No mucosal edema. Mouth/Throat: Oropharynx is clear and moist. No posterior oropharyngeal erythema. Eyes: Conjunctivae and EOM are normal. Pupils are equal, round, and reactive to light. Neck: Normal range of motion. Neck supple. No thyromegaly present. Cardiovascular: Normal rate, regular rhythm and normal heart sounds. No murmur heard. Pulmonary/Chest: Effort normal and breath sounds normal. She has no wheezes. Shehas no rales. Abdominal: Soft. Bowel sounds are normal. She exhibits no distension and no mass. There is no tenderness. There is no rebound and no guarding. umbilcal hernia reducable   Genitourinary/Anorectal:deferred  Musculoskeletal: Normal range of motion. She exhibits no edema or mi ld upper thor abd across lumbar  tenderness. Lymphadenopathy: She has no cervical adenopathy. Neurological: She is alert and oriented to person, place, and time. She has normal reflexes. Skin: Skin is warm and dry. No rash noted. Psychiatric: She has a normal mood and affect. Her   behavior is normal.       Assessment:      1. Umbilical hernia without obstruction and without gangrene    2. Lumbar pain    3. Acute bilateral thoracic back pain          Plan:      Call or return to clinic prn if these symptoms worsen or fail to improve as anticipated. I have reviewed the instructions with the patient, answering all questions to her satisfaction. No follow-ups on file. Orders Placed This Encounter   Procedures    XR LUMBAR SPINE (MIN 4 VIEWS)     Standing Status:   Future     Standing Expiration Date:   9/3/2020     Order Specific Question:   Reason for exam:     Answer:   pain    XR THORACIC SPINE (3 VIEWS)     Standing Status:   Future     Standing Expiration Date:   9/3/2020     Order Specific Question:   Reason for exam:     Answer:   pain    [facility] - TOSHA Bansal, [address] [facility], Plastic Surgery, [facility] Priority:   Routine     Referral Type:   Eval and Treat     Referral Reason:   Specialty Services Required     Referred to Provider:   Dennis Lehman MD     Requested Specialty:   Plastic Surgery     Number of Visits Requested:   Nav Villegas MD, General Surgery, [facility]     Referral Priority:   Routine     Referral Type:   Eval and Treat     Referral Reason:   Specialty Services Required     Referred to Provider:   Heidy Dalal MD     Requested Specialty:   General Surgery     Number of Visits Requested:   1     No orders of the defined types were placed in this encounter.       Electronically signed by Jodi Weston DO on 9/3/2019 at 1:37 PM

## 2019-09-19 ENCOUNTER — HOSPITAL ENCOUNTER (OUTPATIENT)
Dept: GENERAL RADIOLOGY | Age: 34
Discharge: HOME | End: 2019-09-21
Payer: COMMERCIAL

## 2019-09-19 ENCOUNTER — HOSPITAL ENCOUNTER (OUTPATIENT)
Age: 34
Discharge: HOME | End: 2019-09-21
Payer: COMMERCIAL

## 2019-09-19 DIAGNOSIS — M54.6 ACUTE BILATERAL THORACIC BACK PAIN: ICD-10-CM

## 2019-09-19 DIAGNOSIS — M47.26 OSTEOARTHRITIS OF SPINE WITH RADICULOPATHY, LUMBAR REGION: Primary | ICD-10-CM

## 2019-09-19 DIAGNOSIS — M54.50 LUMBAR PAIN: ICD-10-CM

## 2019-09-19 PROCEDURE — 72072 X-RAY EXAM THORAC SPINE 3VWS: CPT

## 2019-09-19 PROCEDURE — 72110 X-RAY EXAM L-2 SPINE 4/>VWS: CPT

## 2019-09-25 ENCOUNTER — HOSPITAL ENCOUNTER (OUTPATIENT)
Dept: MRI IMAGING | Age: 34
Discharge: HOME | End: 2019-09-27
Payer: COMMERCIAL

## 2019-09-25 ENCOUNTER — TELEPHONE (OUTPATIENT)
Dept: FAMILY MEDICINE CLINIC | Age: 34
End: 2019-09-25

## 2019-09-25 DIAGNOSIS — M47.26 OSTEOARTHRITIS OF SPINE WITH RADICULOPATHY, LUMBAR REGION: ICD-10-CM

## 2019-09-25 PROCEDURE — 72148 MRI LUMBAR SPINE W/O DYE: CPT

## 2019-09-26 ENCOUNTER — TELEPHONE (OUTPATIENT)
Dept: FAMILY MEDICINE CLINIC | Age: 34
End: 2019-09-26

## 2019-11-14 ENCOUNTER — OFFICE VISIT (OUTPATIENT)
Dept: FAMILY MEDICINE CLINIC | Age: 34
End: 2019-11-14
Payer: COMMERCIAL

## 2019-11-14 VITALS
OXYGEN SATURATION: 98 % | HEART RATE: 88 BPM | DIASTOLIC BLOOD PRESSURE: 86 MMHG | TEMPERATURE: 98.3 F | SYSTOLIC BLOOD PRESSURE: 125 MMHG

## 2019-11-14 DIAGNOSIS — M54.16 LUMBAR RADICULAR PAIN: Primary | ICD-10-CM

## 2019-11-14 PROCEDURE — 99213 OFFICE O/P EST LOW 20 MIN: CPT | Performed by: NURSE PRACTITIONER

## 2019-11-14 RX ORDER — CYCLOBENZAPRINE HCL 10 MG
10 TABLET ORAL 3 TIMES DAILY PRN
Qty: 21 TABLET | Refills: 0 | Status: SHIPPED | OUTPATIENT
Start: 2019-11-14 | End: 2019-11-24

## 2019-11-14 RX ORDER — METHYLPREDNISOLONE 4 MG/1
TABLET ORAL
Qty: 1 KIT | Refills: 0 | Status: SHIPPED | OUTPATIENT
Start: 2019-11-14 | End: 2019-11-20

## 2019-11-14 ASSESSMENT — ENCOUNTER SYMPTOMS
BOWEL INCONTINENCE: 0
ABDOMINAL PAIN: 0
BACK PAIN: 1

## 2019-12-18 ENCOUNTER — ANESTHESIA EVENT (OUTPATIENT)
Dept: OPERATING ROOM | Age: 34
End: 2019-12-18
Payer: COMMERCIAL

## 2019-12-18 ENCOUNTER — HOSPITAL ENCOUNTER (OUTPATIENT)
Age: 34
Setting detail: OUTPATIENT SURGERY
Discharge: HOME | End: 2019-12-18
Attending: SURGERY | Admitting: SURGERY
Payer: COMMERCIAL

## 2019-12-18 ENCOUNTER — ANESTHESIA (OUTPATIENT)
Dept: OPERATING ROOM | Age: 34
End: 2019-12-18
Payer: COMMERCIAL

## 2019-12-18 VITALS
DIASTOLIC BLOOD PRESSURE: 72 MMHG | SYSTOLIC BLOOD PRESSURE: 106 MMHG | BODY MASS INDEX: 30.83 KG/M2 | RESPIRATION RATE: 10 BRPM | HEART RATE: 79 BPM | WEIGHT: 174 LBS | HEIGHT: 63 IN | TEMPERATURE: 97.2 F | OXYGEN SATURATION: 95 %

## 2019-12-18 VITALS — TEMPERATURE: 96.8 F | DIASTOLIC BLOOD PRESSURE: 54 MMHG | SYSTOLIC BLOOD PRESSURE: 87 MMHG | OXYGEN SATURATION: 95 %

## 2019-12-18 DIAGNOSIS — K42.9 UMBILICAL HERNIA WITHOUT OBSTRUCTION AND WITHOUT GANGRENE: Primary | ICD-10-CM

## 2019-12-18 LAB — HCG, PREGNANCY URINE (POC): NEGATIVE

## 2019-12-18 PROCEDURE — 2580000003 HC RX 258: Performed by: NURSE ANESTHETIST, CERTIFIED REGISTERED

## 2019-12-18 PROCEDURE — 2580000003 HC RX 258: Performed by: ANESTHESIOLOGY

## 2019-12-18 PROCEDURE — 6360000002 HC RX W HCPCS: Performed by: SURGERY

## 2019-12-18 PROCEDURE — 6370000000 HC RX 637 (ALT 250 FOR IP): Performed by: ANESTHESIOLOGY

## 2019-12-18 PROCEDURE — 6360000002 HC RX W HCPCS: Performed by: ANESTHESIOLOGY

## 2019-12-18 PROCEDURE — 2709999900 HC NON-CHARGEABLE SUPPLY: Performed by: SURGERY

## 2019-12-18 PROCEDURE — 3600000002 HC SURGERY LEVEL 2 BASE: Performed by: SURGERY

## 2019-12-18 PROCEDURE — 6360000002 HC RX W HCPCS: Performed by: NURSE ANESTHETIST, CERTIFIED REGISTERED

## 2019-12-18 PROCEDURE — 2580000003 HC RX 258: Performed by: SURGERY

## 2019-12-18 PROCEDURE — 7100000001 HC PACU RECOVERY - ADDTL 15 MIN: Performed by: SURGERY

## 2019-12-18 PROCEDURE — 3700000000 HC ANESTHESIA ATTENDED CARE: Performed by: SURGERY

## 2019-12-18 PROCEDURE — 3600000012 HC SURGERY LEVEL 2 ADDTL 15MIN: Performed by: SURGERY

## 2019-12-18 PROCEDURE — 2500000003 HC RX 250 WO HCPCS: Performed by: NURSE ANESTHETIST, CERTIFIED REGISTERED

## 2019-12-18 PROCEDURE — 3700000001 HC ADD 15 MINUTES (ANESTHESIA): Performed by: SURGERY

## 2019-12-18 PROCEDURE — 7100000011 HC PHASE II RECOVERY - ADDTL 15 MIN: Performed by: SURGERY

## 2019-12-18 PROCEDURE — 7100000000 HC PACU RECOVERY - FIRST 15 MIN: Performed by: SURGERY

## 2019-12-18 PROCEDURE — 2500000003 HC RX 250 WO HCPCS: Performed by: SURGERY

## 2019-12-18 PROCEDURE — 7100000010 HC PHASE II RECOVERY - FIRST 15 MIN: Performed by: SURGERY

## 2019-12-18 PROCEDURE — 81025 URINE PREGNANCY TEST: CPT

## 2019-12-18 RX ORDER — DIPHENHYDRAMINE HYDROCHLORIDE 50 MG/ML
12.5 INJECTION INTRAMUSCULAR; INTRAVENOUS
Status: DISCONTINUED | OUTPATIENT
Start: 2019-12-18 | End: 2019-12-18 | Stop reason: HOSPADM

## 2019-12-18 RX ORDER — HYDROMORPHONE HCL 110MG/55ML
0.5 PATIENT CONTROLLED ANALGESIA SYRINGE INTRAVENOUS EVERY 5 MIN PRN
Status: DISCONTINUED | OUTPATIENT
Start: 2019-12-18 | End: 2019-12-18 | Stop reason: HOSPADM

## 2019-12-18 RX ORDER — ONDANSETRON 2 MG/ML
4 INJECTION INTRAMUSCULAR; INTRAVENOUS
Status: DISCONTINUED | OUTPATIENT
Start: 2019-12-18 | End: 2019-12-18 | Stop reason: HOSPADM

## 2019-12-18 RX ORDER — FENTANYL CITRATE 50 UG/ML
25 INJECTION, SOLUTION INTRAMUSCULAR; INTRAVENOUS EVERY 5 MIN PRN
Status: DISCONTINUED | OUTPATIENT
Start: 2019-12-18 | End: 2019-12-18 | Stop reason: HOSPADM

## 2019-12-18 RX ORDER — DEXAMETHASONE SODIUM PHOSPHATE 10 MG/ML
4 INJECTION INTRAMUSCULAR; INTRAVENOUS
Status: DISCONTINUED | OUTPATIENT
Start: 2019-12-18 | End: 2019-12-18 | Stop reason: HOSPADM

## 2019-12-18 RX ORDER — PROMETHAZINE HYDROCHLORIDE 25 MG/ML
6.25 INJECTION, SOLUTION INTRAMUSCULAR; INTRAVENOUS ONCE
Status: COMPLETED | OUTPATIENT
Start: 2019-12-18 | End: 2019-12-18

## 2019-12-18 RX ORDER — LABETALOL HYDROCHLORIDE 5 MG/ML
5 INJECTION, SOLUTION INTRAVENOUS EVERY 10 MIN PRN
Status: DISCONTINUED | OUTPATIENT
Start: 2019-12-18 | End: 2019-12-18 | Stop reason: HOSPADM

## 2019-12-18 RX ORDER — OXYCODONE HYDROCHLORIDE AND ACETAMINOPHEN 5; 325 MG/1; MG/1
1 TABLET ORAL EVERY 6 HOURS PRN
Qty: 12 TABLET | Refills: 0 | Status: SHIPPED | OUTPATIENT
Start: 2019-12-18 | End: 2019-12-21

## 2019-12-18 RX ORDER — MEPERIDINE HYDROCHLORIDE 50 MG/ML
12.5 INJECTION INTRAMUSCULAR; INTRAVENOUS; SUBCUTANEOUS EVERY 5 MIN PRN
Status: DISCONTINUED | OUTPATIENT
Start: 2019-12-18 | End: 2019-12-18 | Stop reason: HOSPADM

## 2019-12-18 RX ORDER — GLYCOPYRROLATE 1 MG/5 ML
SYRINGE (ML) INTRAVENOUS PRN
Status: DISCONTINUED | OUTPATIENT
Start: 2019-12-18 | End: 2019-12-18 | Stop reason: SDUPTHER

## 2019-12-18 RX ORDER — SODIUM CHLORIDE, SODIUM LACTATE, POTASSIUM CHLORIDE, CALCIUM CHLORIDE 600; 310; 30; 20 MG/100ML; MG/100ML; MG/100ML; MG/100ML
INJECTION, SOLUTION INTRAVENOUS CONTINUOUS PRN
Status: DISCONTINUED | OUTPATIENT
Start: 2019-12-18 | End: 2019-12-18 | Stop reason: SDUPTHER

## 2019-12-18 RX ORDER — ONDANSETRON 2 MG/ML
INJECTION INTRAMUSCULAR; INTRAVENOUS PRN
Status: DISCONTINUED | OUTPATIENT
Start: 2019-12-18 | End: 2019-12-18 | Stop reason: SDUPTHER

## 2019-12-18 RX ORDER — PROPOFOL 10 MG/ML
INJECTION, EMULSION INTRAVENOUS PRN
Status: DISCONTINUED | OUTPATIENT
Start: 2019-12-18 | End: 2019-12-18 | Stop reason: SDUPTHER

## 2019-12-18 RX ORDER — ROCURONIUM BROMIDE 10 MG/ML
INJECTION, SOLUTION INTRAVENOUS PRN
Status: DISCONTINUED | OUTPATIENT
Start: 2019-12-18 | End: 2019-12-18 | Stop reason: SDUPTHER

## 2019-12-18 RX ORDER — ONDANSETRON 4 MG/1
4 TABLET, ORALLY DISINTEGRATING ORAL EVERY 8 HOURS PRN
Qty: 21 TABLET | Refills: 0 | Status: SHIPPED | OUTPATIENT
Start: 2019-12-18 | End: 2020-05-29

## 2019-12-18 RX ORDER — BUPIVACAINE HYDROCHLORIDE AND EPINEPHRINE 5; 5 MG/ML; UG/ML
INJECTION, SOLUTION EPIDURAL; INTRACAUDAL; PERINEURAL PRN
Status: DISCONTINUED | OUTPATIENT
Start: 2019-12-18 | End: 2019-12-18 | Stop reason: ALTCHOICE

## 2019-12-18 RX ORDER — SODIUM CHLORIDE, SODIUM LACTATE, POTASSIUM CHLORIDE, CALCIUM CHLORIDE 600; 310; 30; 20 MG/100ML; MG/100ML; MG/100ML; MG/100ML
INJECTION, SOLUTION INTRAVENOUS CONTINUOUS
Status: DISCONTINUED | OUTPATIENT
Start: 2019-12-18 | End: 2019-12-18 | Stop reason: HOSPADM

## 2019-12-18 RX ORDER — ASCORBIC ACID 500 MG
500 TABLET ORAL DAILY
COMMUNITY
End: 2020-05-29

## 2019-12-18 RX ORDER — HYDRALAZINE HYDROCHLORIDE 20 MG/ML
5 INJECTION INTRAMUSCULAR; INTRAVENOUS EVERY 10 MIN PRN
Status: DISCONTINUED | OUTPATIENT
Start: 2019-12-18 | End: 2019-12-18 | Stop reason: HOSPADM

## 2019-12-18 RX ORDER — OXYCODONE HYDROCHLORIDE AND ACETAMINOPHEN 5; 325 MG/1; MG/1
1 TABLET ORAL PRN
Status: COMPLETED | OUTPATIENT
Start: 2019-12-18 | End: 2019-12-18

## 2019-12-18 RX ORDER — SCOLOPAMINE TRANSDERMAL SYSTEM 1 MG/1
1 PATCH, EXTENDED RELEASE TRANSDERMAL ONCE
Status: DISCONTINUED | OUTPATIENT
Start: 2019-12-18 | End: 2019-12-18 | Stop reason: HOSPADM

## 2019-12-18 RX ORDER — DEXAMETHASONE SODIUM PHOSPHATE 10 MG/ML
INJECTION INTRAMUSCULAR; INTRAVENOUS PRN
Status: DISCONTINUED | OUTPATIENT
Start: 2019-12-18 | End: 2019-12-18 | Stop reason: SDUPTHER

## 2019-12-18 RX ORDER — OXYCODONE HYDROCHLORIDE AND ACETAMINOPHEN 5; 325 MG/1; MG/1
2 TABLET ORAL PRN
Status: COMPLETED | OUTPATIENT
Start: 2019-12-18 | End: 2019-12-18

## 2019-12-18 RX ORDER — LIDOCAINE HYDROCHLORIDE 20 MG/ML
INJECTION, SOLUTION EPIDURAL; INFILTRATION; INTRACAUDAL; PERINEURAL PRN
Status: DISCONTINUED | OUTPATIENT
Start: 2019-12-18 | End: 2019-12-18 | Stop reason: SDUPTHER

## 2019-12-18 RX ORDER — MIDAZOLAM HYDROCHLORIDE 1 MG/ML
INJECTION INTRAMUSCULAR; INTRAVENOUS PRN
Status: DISCONTINUED | OUTPATIENT
Start: 2019-12-18 | End: 2019-12-18 | Stop reason: SDUPTHER

## 2019-12-18 RX ORDER — FENTANYL CITRATE 50 UG/ML
INJECTION, SOLUTION INTRAMUSCULAR; INTRAVENOUS PRN
Status: DISCONTINUED | OUTPATIENT
Start: 2019-12-18 | End: 2019-12-18 | Stop reason: SDUPTHER

## 2019-12-18 RX ADMIN — LIDOCAINE HYDROCHLORIDE 60 MG: 20 INJECTION, SOLUTION EPIDURAL; INFILTRATION; INTRACAUDAL; PERINEURAL at 11:31

## 2019-12-18 RX ADMIN — SODIUM CHLORIDE, POTASSIUM CHLORIDE, SODIUM LACTATE AND CALCIUM CHLORIDE: 600; 310; 30; 20 INJECTION, SOLUTION INTRAVENOUS at 10:13

## 2019-12-18 RX ADMIN — PROPOFOL 200 MG: 10 INJECTION, EMULSION INTRAVENOUS at 11:31

## 2019-12-18 RX ADMIN — ROCURONIUM BROMIDE 50 MG: 10 INJECTION, SOLUTION INTRAVENOUS at 11:31

## 2019-12-18 RX ADMIN — HYDROMORPHONE HYDROCHLORIDE 0.5 MG: 2 INJECTION, SOLUTION INTRAMUSCULAR; INTRAVENOUS; SUBCUTANEOUS at 12:58

## 2019-12-18 RX ADMIN — Medication 0.4 MG: at 12:18

## 2019-12-18 RX ADMIN — MIDAZOLAM 2 MG: 1 INJECTION INTRAMUSCULAR; INTRAVENOUS at 11:28

## 2019-12-18 RX ADMIN — DEXAMETHASONE SODIUM PHOSPHATE 10 MG: 10 INJECTION INTRAMUSCULAR; INTRAVENOUS at 11:31

## 2019-12-18 RX ADMIN — HYDROMORPHONE HYDROCHLORIDE 0.5 MG: 2 INJECTION, SOLUTION INTRAMUSCULAR; INTRAVENOUS; SUBCUTANEOUS at 13:10

## 2019-12-18 RX ADMIN — Medication 100 MCG: at 11:31

## 2019-12-18 RX ADMIN — NEOSTIGMINE METHYLSULFATE 3 MG: 1 INJECTION INTRAMUSCULAR; INTRAVENOUS; SUBCUTANEOUS at 12:18

## 2019-12-18 RX ADMIN — CEFAZOLIN 2 G: 1 INJECTION, POWDER, FOR SOLUTION INTRAMUSCULAR; INTRAVENOUS at 11:38

## 2019-12-18 RX ADMIN — SODIUM CHLORIDE, POTASSIUM CHLORIDE, SODIUM LACTATE AND CALCIUM CHLORIDE: 600; 310; 30; 20 INJECTION, SOLUTION INTRAVENOUS at 11:28

## 2019-12-18 RX ADMIN — ONDANSETRON 4 MG: 2 INJECTION, SOLUTION INTRAMUSCULAR; INTRAVENOUS at 12:16

## 2019-12-18 RX ADMIN — PROMETHAZINE HYDROCHLORIDE 6.25 MG: 25 INJECTION INTRAMUSCULAR; INTRAVENOUS at 14:08

## 2019-12-18 RX ADMIN — OXYCODONE AND ACETAMINOPHEN 2 TABLET: 5; 325 TABLET ORAL at 13:36

## 2019-12-18 ASSESSMENT — PULMONARY FUNCTION TESTS
PIF_VALUE: 17
PIF_VALUE: 16
PIF_VALUE: 17
PIF_VALUE: 18
PIF_VALUE: 18
PIF_VALUE: 16
PIF_VALUE: 17
PIF_VALUE: 16
PIF_VALUE: 17
PIF_VALUE: 18
PIF_VALUE: 17
PIF_VALUE: 1
PIF_VALUE: 17
PIF_VALUE: 17
PIF_VALUE: 18
PIF_VALUE: 16
PIF_VALUE: 2
PIF_VALUE: 24
PIF_VALUE: 17
PIF_VALUE: 17
PIF_VALUE: 16
PIF_VALUE: 18
PIF_VALUE: 18
PIF_VALUE: 17
PIF_VALUE: 18
PIF_VALUE: 0
PIF_VALUE: 18
PIF_VALUE: 16
PIF_VALUE: 17
PIF_VALUE: 17
PIF_VALUE: 19
PIF_VALUE: 17
PIF_VALUE: 24
PIF_VALUE: 32
PIF_VALUE: 36
PIF_VALUE: 17
PIF_VALUE: 17
PIF_VALUE: 18
PIF_VALUE: 15
PIF_VALUE: 17
PIF_VALUE: 17
PIF_VALUE: 18
PIF_VALUE: 17
PIF_VALUE: 18
PIF_VALUE: 16
PIF_VALUE: 20
PIF_VALUE: 17
PIF_VALUE: 18
PIF_VALUE: 19
PIF_VALUE: 17
PIF_VALUE: 20
PIF_VALUE: 17
PIF_VALUE: 16
PIF_VALUE: 16
PIF_VALUE: 17
PIF_VALUE: 18
PIF_VALUE: 16

## 2019-12-18 ASSESSMENT — PAIN DESCRIPTION - DESCRIPTORS
DESCRIPTORS: OTHER (COMMENT)
DESCRIPTORS: ACHING;DISCOMFORT

## 2019-12-18 ASSESSMENT — PAIN SCALES - GENERAL
PAINLEVEL_OUTOF10: 5
PAINLEVEL_OUTOF10: 7
PAINLEVEL_OUTOF10: 9
PAINLEVEL_OUTOF10: 8
PAINLEVEL_OUTOF10: 2
PAINLEVEL_OUTOF10: 5
PAINLEVEL_OUTOF10: 9
PAINLEVEL_OUTOF10: 6
PAINLEVEL_OUTOF10: 5
PAINLEVEL_OUTOF10: 6

## 2019-12-18 ASSESSMENT — PAIN DESCRIPTION - FREQUENCY
FREQUENCY: CONTINUOUS
FREQUENCY: CONTINUOUS

## 2019-12-18 ASSESSMENT — PAIN DESCRIPTION - PROGRESSION: CLINICAL_PROGRESSION: GRADUALLY WORSENING

## 2019-12-18 ASSESSMENT — PAIN DESCRIPTION - ONSET
ONSET: ON-GOING
ONSET: ON-GOING

## 2019-12-18 ASSESSMENT — PAIN DESCRIPTION - PAIN TYPE
TYPE: SURGICAL PAIN
TYPE: SURGICAL PAIN

## 2019-12-18 ASSESSMENT — PAIN DESCRIPTION - LOCATION
LOCATION: ABDOMEN
LOCATION: ABDOMEN

## 2019-12-18 ASSESSMENT — PAIN DESCRIPTION - ORIENTATION
ORIENTATION: MID
ORIENTATION: MID

## 2020-01-15 ENCOUNTER — OFFICE VISIT (OUTPATIENT)
Dept: FAMILY MEDICINE CLINIC | Age: 35
End: 2020-01-15
Payer: COMMERCIAL

## 2020-01-15 VITALS
HEIGHT: 63 IN | DIASTOLIC BLOOD PRESSURE: 75 MMHG | OXYGEN SATURATION: 98 % | TEMPERATURE: 98.3 F | WEIGHT: 170 LBS | BODY MASS INDEX: 30.12 KG/M2 | HEART RATE: 76 BPM | SYSTOLIC BLOOD PRESSURE: 113 MMHG

## 2020-01-15 LAB — S PYO AG THROAT QL: NORMAL

## 2020-01-15 PROCEDURE — 87880 STREP A ASSAY W/OPTIC: CPT | Performed by: NURSE PRACTITIONER

## 2020-01-15 PROCEDURE — 99213 OFFICE O/P EST LOW 20 MIN: CPT | Performed by: NURSE PRACTITIONER

## 2020-01-15 ASSESSMENT — ENCOUNTER SYMPTOMS
EYE DISCHARGE: 0
SHORTNESS OF BREATH: 0
CHEST TIGHTNESS: 0
VOICE CHANGE: 0
SORE THROAT: 1
COUGH: 0
EYE REDNESS: 0
SINUS PRESSURE: 0
WHEEZING: 0

## 2020-01-15 ASSESSMENT — PATIENT HEALTH QUESTIONNAIRE - PHQ9
SUM OF ALL RESPONSES TO PHQ QUESTIONS 1-9: 0
1. LITTLE INTEREST OR PLEASURE IN DOING THINGS: 0
2. FEELING DOWN, DEPRESSED OR HOPELESS: 0
SUM OF ALL RESPONSES TO PHQ QUESTIONS 1-9: 0
SUM OF ALL RESPONSES TO PHQ9 QUESTIONS 1 & 2: 0

## 2020-01-15 NOTE — PATIENT INSTRUCTIONS
mucus from your lungs by breathing deeply and coughing. · Gargle with warm salt water once an hour. This can help reduce swelling and throat pain. Use 1 teaspoon of salt mixed in 1 cup of warm water. · Do not smoke or allow others to smoke around you. If you need help quitting, talk to your doctor about stop-smoking programs and medicines. These can increase your chances of quitting for good. To avoid spreading the virus  · Cough or sneeze into a tissue. Then throw the tissue away. · If you don't have a tissue, use your hand to cover your cough or sneeze. Then clean your hand. You can also cough into your sleeve. · Wash your hands often. Use soap and warm water. Wash for 15 to 20 seconds each time. · If you don't have soap and water near you, you can clean your hands with alcohol wipes or gel. When should you call for help? Call your doctor now or seek immediate medical care if:    · You have a new or higher fever.     · Your fever lasts more than 48 hours.     · You have trouble breathing.     · You have a fever with a stiff neck or a severe headache.     · You are sensitive to light.     · You feel very sleepy or confused.    Watch closely for changes in your health, and be sure to contact your doctor if:    · You do not get better as expected. Where can you learn more? Go to https://InstahealthpeTrendsetterseb.Gimahhot. org and sign in to your Team Apart account. Enter P060 in the [facility] box to learn more about \"Viral Respiratory Infection: Care Instructions. \"     If you do not have an account, please click on the \"Sign Up Now\" link. Current as of: June 9, 2019  Content Version: 12.3  © 4878-9002 [employer]. Care instructions adapted under license by [facility] ([facility]). If you have questions about a medical condition or this instruction, always ask your healthcare professional. [address] any warranty or liability for your use of this information.

## 2020-01-15 NOTE — PROGRESS NOTES
improve/worsen. Patient given educational materials - see patient instructions. Discussed use, benefit, and side effects of prescribed medications. All patientquestions answered. Pt voiced understanding.     Electronically signed by MARINA Strickland CNP on 1/15/2020at 2:16 PM

## 2020-05-29 ENCOUNTER — TELEMEDICINE (OUTPATIENT)
Dept: FAMILY MEDICINE CLINIC | Age: 35
End: 2020-05-29
Payer: COMMERCIAL

## 2020-05-29 ENCOUNTER — PATIENT MESSAGE (OUTPATIENT)
Dept: FAMILY MEDICINE CLINIC | Age: 35
End: 2020-05-29

## 2020-05-29 VITALS — WEIGHT: 170 LBS | HEIGHT: 63 IN | BODY MASS INDEX: 30.12 KG/M2

## 2020-05-29 PROCEDURE — G8417 CALC BMI ABV UP PARAM F/U: HCPCS | Performed by: NURSE PRACTITIONER

## 2020-05-29 PROCEDURE — 99215 OFFICE O/P EST HI 40 MIN: CPT | Performed by: NURSE PRACTITIONER

## 2020-05-29 RX ORDER — BUSPIRONE HYDROCHLORIDE 7.5 MG/1
7.5 TABLET ORAL 2 TIMES DAILY
Qty: 60 TABLET | Refills: 0 | Status: SHIPPED | OUTPATIENT
Start: 2020-05-29 | End: 2020-07-01 | Stop reason: SDUPTHER

## 2020-05-29 ASSESSMENT — ENCOUNTER SYMPTOMS
COUGH: 0
ABDOMINAL PAIN: 0
WHEEZING: 0
SHORTNESS OF BREATH: 0
VOMITING: 0
DIARRHEA: 1
CHEST TIGHTNESS: 1
NAUSEA: 0

## 2020-07-01 ENCOUNTER — PATIENT MESSAGE (OUTPATIENT)
Dept: FAMILY MEDICINE CLINIC | Age: 35
End: 2020-07-01

## 2020-07-01 RX ORDER — BUSPIRONE HYDROCHLORIDE 7.5 MG/1
7.5 TABLET ORAL 2 TIMES DAILY
Qty: 60 TABLET | Refills: 5 | Status: SHIPPED | OUTPATIENT
Start: 2020-07-01 | End: 2020-07-31

## 2020-07-25 ENCOUNTER — HOSPITAL ENCOUNTER (OUTPATIENT)
Age: 35
Setting detail: SPECIMEN
Discharge: HOME | End: 2020-07-25
Payer: COMMERCIAL

## 2020-07-25 ENCOUNTER — OFFICE VISIT (OUTPATIENT)
Dept: FAMILY MEDICINE CLINIC | Age: 35
End: 2020-07-25
Payer: COMMERCIAL

## 2020-07-25 VITALS
TEMPERATURE: 98.2 F | HEART RATE: 97 BPM | OXYGEN SATURATION: 98 % | HEIGHT: 63 IN | WEIGHT: 171 LBS | BODY MASS INDEX: 30.3 KG/M2

## 2020-07-25 LAB — S PYO AG THROAT QL: POSITIVE

## 2020-07-25 PROCEDURE — 87880 STREP A ASSAY W/OPTIC: CPT | Performed by: NURSE PRACTITIONER

## 2020-07-25 PROCEDURE — 99213 OFFICE O/P EST LOW 20 MIN: CPT | Performed by: NURSE PRACTITIONER

## 2020-07-25 RX ORDER — AMOXICILLIN 500 MG/1
500 CAPSULE ORAL 3 TIMES DAILY
Qty: 30 CAPSULE | Refills: 0 | Status: SHIPPED | OUTPATIENT
Start: 2020-07-25 | End: 2020-08-04

## 2020-07-25 ASSESSMENT — ENCOUNTER SYMPTOMS
WHEEZING: 0
SORE THROAT: 1
CHEST TIGHTNESS: 0
RHINORRHEA: 1
SHORTNESS OF BREATH: 0
EYE DISCHARGE: 0
EYE REDNESS: 0
SINUS PRESSURE: 0
VOICE CHANGE: 0
COUGH: 1

## 2020-07-25 NOTE — PATIENT INSTRUCTIONS
Patient Education        Learning About Coronavirus [phone])  Coronavirus [phone]): Overview  What is coronavirus ([id number])? The coronavirus disease (COVID-19) is caused by a virus. It is an illness that was first found in [place], [place], in December 2019. It has since spread worldwide. The virus can cause fever, cough, and trouble breathing. In severe cases, it can cause pneumonia and make it hard to breathe without help. It can cause death. Coronaviruses are a large group of viruses. They cause the common cold. They also cause more serious illnesses like Middle East respiratory syndrome (MERS) and severe acute respiratory syndrome (SARS). COVID-19 is caused by a novel coronavirus. That means it's a new type that has not been seen in people before. This virus spreads person-to-person through droplets from coughing and sneezing. It can also spread when you are close to someone who is infected. And it can spread when you touch something that has the virus on it, such as a doorknob or a tabletop. What can you do to protect yourself from coronavirus (COVID-19)? The best way to protect yourself from getting sick is to:  · Avoid areas where there is an outbreak. · Avoid contact with people who may be infected. · Wash your hands often with soap or alcohol-based hand sanitizers. · Avoid crowds and try to stay at least 6 feet away from other people. · Wash your hands often, especially after you cough or sneeze. Use soap and water, and scrub for at least 20 seconds. If soap and water aren't available, use an alcohol-based hand . · Avoid touching your mouth, nose, and eyes. What can you do to avoid spreading the virus to others? To help avoid spreading the virus to others:  · Cover your mouth with a tissue when you cough or sneeze. Then throw the tissue in the trash. · Use a disinfectant to clean things that you touch often. · Wear a cloth face cover if you have to go to public areas.   · Stay home if you are sick or have been exposed to the virus. Don't go to school, work, or public areas. And don't use public transportation, ride-shares, or taxis unless you have no choice. · If you are sick:  ? Leave your home only if you need to get medical care. But call the doctor's office first so they know you're coming. And wear a face cover. ? Wear the face cover whenever you're around other people. It can help stop the spread of the virus when you cough or sneeze. ? Clean and disinfect your home every day. Use household  and disinfectant wipes or sprays. Take special care to clean things that you grab with your hands. These include doorknobs, remote controls, phones, and handles on your refrigerator and microwave. And don't forget countertops, tabletops, bathrooms, and computer keyboards. When to call for help  [id number] anytime you think you may need emergency care. For example, call if:  · You have severe trouble breathing. (You can't talk at all.)  · You have constant chest pain or pressure. · You are severely dizzy or lightheaded. · You are confused or can't think clearly. · Your face and lips have a blue color. · You pass out (lose consciousness) or are very hard to wake up. Call your doctor now if you develop symptoms such as:  · Shortness of breath. · Fever. · Cough. If you need to get care, call ahead to the doctor's office for instructions before you go. Make sure you wear a face cover to prevent exposing other people to the virus. Where can you get the latest information? The following health organizations are tracking and studying this virus. Their websites contain the most up-to-date information. Rolando Needles also learn what to do if you think you may have been exposed to the virus. · U.S. Centers for Disease Control and Prevention (CDC): The CDC provides updated news about the disease and travel advice. The website also tells you how to prevent the spread of infection.  www.cdc.gov  · World Health Organization [facility]): WHO offers information about the virus outbreaks. WHO also has travel advice. www.who.int  Current as of: May 8, 2020               Content Version: 12.5  © 2006-2020 Healthwise, Incorporated. Care instructions adapted under license by [facility] ([facility]). If you have questions about a medical condition or this instruction, always ask your healthcare professional. [address] any warranty or liability for your use of this information. Patient Education        Coronavirus ([id number]-74): Care Instructions  Overview  The coronavirus disease (COVID-19) is caused by a virus. Symptoms may include a fever, a cough, and shortness of breath. It mainly spreads person-to-person through droplets from coughing and sneezing. The virus also can spread when people are in close contact with someone who is infected. Most people have mild symptoms and can take care of themselves at home. If their symptoms get worse, they may need care in a hospital. There is no medicine to fight the virus. It's important to not spread the virus to others. If you have COVID-19, wear a face cover anytime you are around other people. You need to isolate yourself while you are sick. Your doctor or local public health official will tell you when you no longer need to be isolated. Leave your home only if you need to get medical care. Follow-up care is a key part of your treatment and safety. Be sure to make and go to all appointments, and call your doctor if you are having problems. It's also a good idea to know your test results and keep a list of the medicines you take. How can you care for yourself at home? · Get extra rest. It can help you feel better. · Drink plenty of fluids. This helps replace fluids lost from fever. Fluids also help ease a scratchy throat. Water, soup, fruit juice, and hot tea with lemon are good choices. · Take acetaminophen (such as Tylenol) to reduce a fever.  It may also help with muscle aches. Read and follow all instructions on the label. · Sponge your body with lukewarm water to help with fever. Don't use cold water or ice. · Use petroleum jelly on sore skin. This can help if the skin around your nose and lips becomes sore from rubbing a lot with tissues. Tips for isolation  · Wear a cloth face cover when you are around other people. It can help stop the spread of the virus when you cough or sneeze. · Limit contact with people in your home. If possible, stay in a separate bedroom and use a separate bathroom. · If you have to leave home, avoid crowds and try to stay at least 6 feet away from other people. · Avoid contact with pets and other animals. · Cover your mouth and nose with a tissue when you cough or sneeze. Then throw it in the trash right away. · Wash your hands often, especially after you cough or sneeze. Use soap and water, and scrub for at least 20 seconds. If soap and water aren't available, use an alcohol-based hand . · Don't share personal household items. These include bedding, towels, cups and glasses, and eating utensils. · [address] in the warmest water allowed for the fabric type, and dry it completely. It's okay to wash other people's laundry with yours. · Clean and disinfect your home every day. Use household  and disinfectant wipes or sprays. Take special care to clean things that you grab with your hands. These include doorknobs, remote controls, phones, and handles on your refrigerator and microwave. And don't forget countertops, tabletops, bathrooms, and computer keyboards. When should you call for help? [id number] anytime you think you may need emergency care. For example, call if you have life-threatening symptoms, such as:  · You have severe trouble breathing. (You can't talk at all.)  · You have constant chest pain or pressure. · You are severely dizzy or lightheaded. · You are confused or can't think clearly.   · Your face and lips have a blue color. · You pass out (lose consciousness) or are very hard to wake up. Call your doctor now or seek immediate medical care if:  · You have moderate trouble breathing. (You can't speak a full sentence.)  · You are coughing up blood (more than about 1 teaspoon). · You have signs of low blood pressure. These include feeling lightheaded; being too weak to stand; and having cold, pale, clammy skin. Watch closely for changes in your health, and be sure to contact your doctor if:  · Your symptoms get worse. · You are not getting better as expected. Call before you go to the doctor's office. Follow their instructions. And wear a cloth face cover. Current as of: May 8, 2020               Content Version: 12.5  © 2006-2020 Healthwise, Incorporated. Care instructions adapted under license by [facility] ([facility]). If you have questions about a medical condition or this instruction, always ask your healthcare professional. [place] [address] any warranty or liability for your use of this information. Patient Education        Strep Throat: Care Instructions  Your Care Instructions     Strep throat is a bacterial infection that causes sudden, severe sore throat and fever. Strep throat, which is caused by bacteria called streptococcus, is treated with antibiotics. Sometimes a strep test is necessary to tell if the sore throat is caused by strep bacteria. Treatment can help ease symptoms and may prevent future problems. Follow-up care is a key part of your treatment and safety. Be sure to make and go to all appointments, and call your doctor if you are having problems. It's also a good idea to know your test results and keep a list of the medicines you take. How can you care for yourself at home? · Take your antibiotics as directed. Do not stop taking them just because you feel better. You need to take the full course of antibiotics.   · Strep throat can spread to others until

## 2020-07-25 NOTE — PROGRESS NOTES
[facility]  [address] [address]  Dept: [phone]  Dept Fax: [phone]     Dom Sam is a 30 y.o. female who presents to the urgent care today for her medicalconditions/complaints as noted below. Dom Sam is c/o of Covid Testing    HPI:      Pharyngitis   This is a new problem. Associated symptoms include congestion, coughing, a fever and a sore throat. Pertinent negatives include no chest pain, chills, fatigue, headaches, myalgias, rash or weakness. History reviewed. No pertinent past medical history. Current Outpatient Medications   Medication Sig Dispense Refill    amoxicillin (AMOXIL) 500 MG capsule Take 1 capsule by mouth 3 times daily for 10 days 30 capsule 0    busPIRone (BUSPAR) 7.5 MG tablet Take 1 tablet by mouth 2 times daily 60 tablet 5     No current facility-administered medications for this visit. Allergies   Allergen Reactions    Tape Triston Cork Tape] Swelling     Surgical tape       Subjective:      Review of Systems   Constitutional: Positive for fever. Negative for chills and fatigue. HENT: Positive for congestion, rhinorrhea and sore throat. Negative for ear discharge, ear pain, postnasal drip, sinus pressure, sneezing and voice change. Eyes: Negative for discharge and redness. Respiratory: Positive for cough. Negative for chest tightness, shortness of breath and wheezing. Cardiovascular: Negative. Negative for chest pain. Musculoskeletal: Negative for myalgias. Skin: Negative for rash. Neurological: Negative for dizziness, weakness, light-headedness and headaches. Hematological: Negative for adenopathy. All other systems reviewed and are negative. Objective:      Physical Exam  Vitals signs and nursing note reviewed. Constitutional:       General: She is not in acute distress. Appearance: Normal appearance. She is well-developed.  She is not ill-appearing, toxic-appearing or diaphoretic. HENT:      Head: Normocephalic. Right Ear: Tympanic membrane and external ear normal.      Left Ear: Tympanic membrane and external ear normal.      Nose: Nose normal.      Mouth/Throat:      Pharynx: Posterior oropharyngeal erythema present. No oropharyngeal exudate. Eyes:      General:         Right eye: No discharge. Left eye: No discharge. Cardiovascular:      Rate and Rhythm: Normal rate and regular rhythm. Heart sounds: Normal heart sounds. No murmur. Pulmonary:      Effort: Pulmonary effort is normal. No respiratory distress. Breath sounds: Normal breath sounds. No wheezing or rales. Lymphadenopathy:      Cervical: Cervical adenopathy present. Skin:     General: Skin is warm. Findings: No rash. Neurological:      Mental Status: She is alert. Ht 5' 3\" (1.6 m)   Wt 171 lb (77.6 kg)   BMI 30.29 kg/m²     Results for orders placed or performed in visit on 07/25/20   POCT rapid strep A   Result Value Ref Range    Strep A Ag Positive (A) None Detected     Assessment:       Diagnosis Orders   1. Acute streptococcal pharyngitis  amoxicillin (AMOXIL) 500 MG capsule   2. Sore throat  POCT rapid strep A    COVID-19 Ambulatory   3. Fever in other diseases  POCT rapid strep A    COVID-19 Ambulatory     Plan:      Patient instructed to complete entire antibiotic course. Change toothbrush in 24 hours. Salt water gargles and throat lozenges if desired. Will send out COVID19 testing. Possible treatment alterations based on the results. Patient instructed to self-quarantine until testing results are back. Patient instructed not to return to work until results are back. Tylenol as needed for fever/pain. Encouraged adequate hydration and rest.  The patient indicates understanding of these issues and agrees with the plan. Educational materials provided on AVS.  Follow up if symptoms do not improve/worsen.  Discussed symptoms that will warrant urgent ED evaluation/treatment. Orders Placed This Encounter   Medications    amoxicillin (AMOXIL) 500 MG capsule     Sig: Take 1 capsule by mouth 3 times daily for 10 days     Dispense:  30 capsule     Refill:  0        Patient given educational materials - see patient instructions. Discussed use, benefit, and side effects of prescribed medications. All patientquestions answered. Pt voiced understanding.     Electronically signed by MARINA Hawley CNP on 7/25/2020at 2:17 PM

## 2020-07-30 LAB — SARS-COV-2, NAA: NOT DETECTED

## 2020-08-31 ENCOUNTER — OFFICE VISIT (OUTPATIENT)
Dept: FAMILY MEDICINE CLINIC | Age: 35
End: 2020-08-31
Payer: COMMERCIAL

## 2020-08-31 VITALS
SYSTOLIC BLOOD PRESSURE: 116 MMHG | WEIGHT: 174 LBS | OXYGEN SATURATION: 100 % | BODY MASS INDEX: 30.82 KG/M2 | HEART RATE: 99 BPM | DIASTOLIC BLOOD PRESSURE: 88 MMHG | TEMPERATURE: 97.8 F

## 2020-08-31 PROCEDURE — 99213 OFFICE O/P EST LOW 20 MIN: CPT | Performed by: NURSE PRACTITIONER

## 2020-08-31 RX ORDER — IBUPROFEN 800 MG/1
800 TABLET ORAL 3 TIMES DAILY PRN
Qty: 20 TABLET | Refills: 0 | Status: ON HOLD | OUTPATIENT
Start: 2020-08-31 | End: 2021-02-18

## 2020-08-31 ASSESSMENT — ENCOUNTER SYMPTOMS
SHORTNESS OF BREATH: 0
VOMITING: 0
ABDOMINAL PAIN: 0
SINUS PAIN: 0
DIARRHEA: 0
SORE THROAT: 0
COUGH: 0
NAUSEA: 0

## 2020-08-31 NOTE — PROGRESS NOTES
[facility]  [address] [facility] [address] [address]  Dept: [phone]  Dept Fax: [phone]    John Howard is a 30 y.o. female who presents to the urgent care today for her medicalconditions/complaints as noted below. John Howard is c/o of Foot Swelling (pt states hs fell and hurt the top of her foot she is also having some pain in her ankle)      HPI:     71-year-old female patient presents with complaint of left foot pain. Patient reports that approximately 1 week ago she was intoxicated reportedly woke up the next morning with significant pain to her left foot. Patient believes she may have injured herself. Patient reports pain, swelling have persisted x7 days. Has been treating her symptoms with Tylenol. Reports she does have a history of a broken ankle but is unsure on the laterality. Patient has not seen a foot specialist previously. Denies any numbness or tingling. No past medical history on file. Current Outpatient Medications   Medication Sig Dispense Refill    buPROPion HCl (WELLBUTRIN PO) Take by mouth       No current facility-administered medications for this visit. Allergies   Allergen Reactions    Tape Rolf Du Tape] Swelling     Surgical tape       Subjective:      Review of Systems   Constitutional: Negative for chills and fever. HENT: Negative for ear pain, sinus pain and sore throat. Respiratory: Negative for cough and shortness of breath. Cardiovascular: Negative for chest pain and palpitations. Gastrointestinal: Negative for abdominal pain, diarrhea, nausea and vomiting. Musculoskeletal: Positive for arthralgias (foot pain) and joint swelling (foot pain). Neurological: Negative for dizziness and headaches. All other systems reviewed and are negative. Objective:     Physical Exam  Vitals signs and nursing note reviewed.    Constitutional:       Appearance: Normal appearance. HENT:      Mouth/Throat:      Mouth: Mucous membranes are moist.   Cardiovascular:      Rate and Rhythm: Normal rate. Pulmonary:      Effort: Pulmonary effort is normal.      Breath sounds: Normal breath sounds. Musculoskeletal:      Left ankle: She exhibits no swelling. No tenderness. Left foot: Tenderness, bony tenderness and swelling present. Feet:    Skin:     General: Skin is warm and dry. Neurological:      General: No focal deficit present. Mental Status: She is alert and oriented to person, place, and time. /88 (Site: Left Upper Arm, Position: Sitting, Cuff Size: Medium Adult)   Pulse 99   Temp 97.8 °F (36.6 °C) (Temporal)   Wt 174 lb (78.9 kg)   SpO2 100%   BMI 30.82 kg/m²   Lab Review   Hospital Outpatient Visit on 07/25/2020   Component Date Value    SARS-CoV-2, NANCY 07/25/2020 Not Detected    Office Visit on 07/25/2020   Component Date Value    Strep A Ag 07/25/2020 Positive*       Assessment:       Diagnosis Orders   1. Foot injury, left, initial encounter  XR FOOT LEFT (MIN 3 VIEWS)       Plan:      No follow-ups on file. No orders of the defined types were placed in this encounter. Will image left foot and follow-up on results    Narrative & Impression      EXAMINATION:  THREE XRAY VIEWS OF THE LEFT FOOT     8/31/2020 11:55 am     COMPARISON:  None     HISTORY:  ORDERING SYSTEM PROVIDED HISTORY: Foot injury, left, initial encounter  TECHNOLOGIST PROVIDED HISTORY:  pain     Acute left foot pain. Initial encounter.     FINDINGS:  Mild soft tissue swelling of the foot dorsum. Lisfranc alignment is  maintained. No visible fracture. Mild 1st MTP degenerative changes.     IMPRESSION:  1. No acute radiographic finding to account for patient's left foot pain. 2. Mild 1st MTP degenerative changes.        Recommend rest, ice, compression, elevation.   Ace wrap provided per  Instructed to wear hard bottom shoe to avoid flexing the foot excessively. For follow-up with foot and ankle specialist if pain persists     Patient given educational materials - see patient instructions. Discussed use, benefit, and side effects of prescribed medications. All patientquestions answered. Pt voiced understanding. This note was transcribed using dictation with Dragon services. Efforts were made to correct any errors but some words may be misinterpreted.      Electronically signed by MARINA Hawkins CNP on 8/31/2020at 11:58 AM

## 2020-08-31 NOTE — PATIENT INSTRUCTIONS
Patient Education        Foot Pain: Care Instructions  Your Care Instructions  Foot injuries that cause pain and swelling are fairly common. Almost all sports or home repair projects can cause a misstep that ends up as foot pain. Normal wear and tear, especially as you get older, also can cause foot pain. Most minor foot injuries will heal on their own, and home treatment is usually all you need to do. If you have a severe injury, you may need tests and treatment. Follow-up care is a key part of your treatment and safety. Be sure to make and go to all appointments, and call your doctor if you are having problems. It's also a good idea to know your test results and keep a list of the medicines you take. How can you care for yourself at home? · Take pain medicines exactly as directed. ? If the doctor gave you a prescription medicine for pain, take it as prescribed. ? If you are not taking a prescription pain medicine, ask your doctor if you can take an over-the-counter medicine. · Rest and protect your foot. Take a break from any activity that may cause pain. · Put ice or a cold pack on your foot for 10 to 20 minutes at a time. Put a thin cloth between the ice and your skin. · Prop up the sore foot on a pillow when you ice it or anytime you sit or lie down during the next 3 days. Try to keep it above the level of your heart. This will help reduce swelling. · Your doctor may recommend that you wrap your foot with an elastic bandage. Keep your foot wrapped for as long as your doctor advises. · If your doctor recommends crutches, use them as directed. · Wear roomy footwear. · As soon as pain and swelling end, begin gentle exercises of your foot. Your doctor can tell you which exercises will help. When should you call for help? [id number] anytime you think you may need emergency care. For example, call if:  · Your foot turns pale, white, blue, or cold.   Call your doctor now or seek immediate medical care if:  · You cannot move or stand on your foot. · Your foot looks twisted or out of its normal position. · Your foot is not stable when you step down. · You have signs of infection, such as:  ? Increased pain, swelling, warmth, or redness. ? Red streaks leading from the sore area. ? Pus draining from a place on your foot. ? A fever. · Your foot is numb or tingly. Watch closely for changes in your health, and be sure to contact your doctor if:  · You do not get better as expected. · You have bruises from an injury that last longer than 2 weeks. Where can you learn more? Go to https://icanbuypeOpower.Personal. org and sign in to your [employer] account. Enter W534 in the [employer]gent [employer] box to learn more about \"Foot Pain: Care Instructions. \"     If you do not have an account, please click on the \"Sign Up Now\" link. Current as of: March 2, 2020               Content Version: 12.5  © 2006-2020 Healthwise, Incorporated. Care instructions adapted under license by [facility] ([facility]). If you have questions about a medical condition or this instruction, always ask your healthcare professional. [place] [address] any warranty or liability for your use of this information.

## 2020-09-09 ENCOUNTER — OFFICE VISIT (OUTPATIENT)
Dept: ORTHOPEDIC SURGERY | Age: 35
End: 2020-09-09
Payer: COMMERCIAL

## 2020-09-09 ENCOUNTER — HOSPITAL ENCOUNTER (OUTPATIENT)
Dept: MRI IMAGING | Age: 35
Discharge: HOME | End: 2020-09-11
Payer: COMMERCIAL

## 2020-09-09 VITALS
HEIGHT: 63 IN | DIASTOLIC BLOOD PRESSURE: 83 MMHG | BODY MASS INDEX: 30.83 KG/M2 | SYSTOLIC BLOOD PRESSURE: 105 MMHG | WEIGHT: 174 LBS | HEART RATE: 93 BPM

## 2020-09-09 PROCEDURE — 73721 MRI JNT OF LWR EXTRE W/O DYE: CPT

## 2020-09-09 PROCEDURE — 99204 OFFICE O/P NEW MOD 45 MIN: CPT | Performed by: ORTHOPAEDIC SURGERY

## 2020-09-09 PROCEDURE — 73718 MRI LOWER EXTREMITY W/O DYE: CPT

## 2020-09-09 RX ORDER — ASPIRIN 325 MG
325 TABLET, DELAYED RELEASE (ENTERIC COATED) ORAL DAILY
Qty: 42 TABLET | Refills: 0 | Status: SHIPPED | OUTPATIENT
Start: 2020-09-09 | End: 2021-01-22

## 2020-09-09 NOTE — LETTER
In order to know exactly how to proceed with treatment (surgical versus nonsurgical, as well as how), an MRI was ordered today to evaluate her peroneal tendons rerupture, as well as her second metatarsal for insufficiency fracture. This is medically necessary to evaluate the structures in this area, for both diagnosis and treatment. All questions were answered and the patient agrees with the above plan. The patient will return to clinic after the MRI has been obtained without x-rays. Next visit, we will review her MRI, and discuss the options as above. I look forward to serving you and your patients again in the future. Please don't hesitate to contact me at my mobile number .         Curly Armstrong MD  Orthopedic Surgery, Foot and Ankle

## 2020-09-09 NOTE — PROGRESS NOTES
Madhu [facility]ran[facility] AND SPORTS MEDICINE  [facility]  [address] [place] [address]  Dept: [phone]    Ambulatory Orthopedic Consult      CHIEF COMPLAINT:    Chief Complaint   Patient presents with    Foot Pain     Left Foot       HISTORY OF PRESENT ILLNESS:      The patient is a 30 y.o. female who is being seen for consultation and evaluation of an injury that occurred 2020, possibly secondary to a fall (patient reports that she is unsure exactly what happened she had several drinks that day, after learning of her father-in-law passing away). The patient reports she felt pain the next day when she woke up. The pain is localized to the left dorsal midfoot and lateral hindfoot. Prior to being seen here today, the patient was seen in the emergency department. The patient reports an associated swelling. The pain is worse with activity and better with rest.     She also reports an inability to canelo her foot, not limited by pain. REVIEW OF SYSTEMS:  Constitutional: Negative for fever. HENT: Negative for tinnitus. Eyes: Negative for pain. Respiratory: Negative for shortness of breath. Cardiovascular: Negative for chest pain. Gastrointestinal: Negative for abdominal pain. Genitourinary: Negative for dysuria. Skin: Negative for rash. Neurological: Negative for headaches. Hematological: Does not bruise/bleed easily. Musculoskeletal: See HPI for pertinent positives     Past Medical History:    She  has no past medical history on file. Past Surgical History:    She  has a past surgical history that includes Ectopic pregnancy surgery; Dental surgery;  section; [place] tooth extraction;  section (N/A, 9609); and Umbilical hernia repair (N/A, 2019).      Current Medications:     Current Outpatient Medications:     buPROPion HCl (WELLBUTRIN PO), Take by mouth, Disp: , Rfl:     ibuprofen (ADVIL;MOTRIN) 800 MG tablet, Take 1 tablet by mouth 3 times daily as needed for Pain, Disp: 20 tablet, Rfl: 0     Allergies:    Tape [adhesive tape]    Family History:  family history includes Mental Illness in her mother; Rheum Arthritis in her father; Seizures in her brother; Stroke in her brother. 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 Skin:  Intact over foot/ankle, without rash/lesions/AV malformations.    Motion: Able to wiggle toes  -Range of motion not tested due to pain  -No tenderness at knee or proximal leg   -Tenderness to palpation: Diffusely throughout the midfoot and especially at the second metatarsal, and along the course the peroneal tendons  -No plantar ecchymosis, no noted instability of the medial column  -Able to fire tib ant, gastrocsoleus complex, posterior tibial tendon, but unable to palpably fire peroneal tendons      RADIOLOGY:   9/9/2020 FINDINGS:  Three weightbearing views (AP, Mortise, and Lateral) of the left ankle and three weightbearing views (AP, Oblique, Lateral) of the left foot were obtained in the office today and reviewed, revealing no acute dislocation, or radioopaque foreign body/tumor. The ankle mortise is maintained with no widening of the clear spaces. Overall alignment is satisfactory. Cortical irregularity at the second distal metatarsal shaft, possibly representing a fracture. IMPRESSION: Possible second metatarsal fracture as above. Electronically signed by Prince Rodríguez MD        Xr Foot Left (min 3 Views)  Result Date: 8/31/2020  1. No acute radiographic finding to account for patient's left foot pain. 2. Mild 1st MTP degenerative changes. ASSESSMENT AND PLAN:  Davis Feldman was seen today for Foot Pain (Left Foot)  The primary encounter diagnosis was Injury of peroneal tendon of left foot, initial encounter. A diagnosis of Stress fracture of metatarsal bone of left foot, initial encounter was also pertinent to this visit. Body mass index is 30.82 kg/m². She has a left foot/ankle injury, with a possible second metatarsal insufficiency fracture, and a possible peroneal tendon rupture (unable to palpably fire her peroneal tendons, tender along the course the peroneal tendons), sustained on 8/24/2020. Notably, she has no relevant past medical history. I had a long discussion today with the patient about the likely diagnosis and its natural history, physical exam and imaging findings, as well as treatment options in detail. We discussed rest/activity modification, swelling control, NSAIDs/Acetaminophen/topical anesthetics, orthotics/shoewear modification, bracing/immobilization, injections, and physical therapy. Surgically, we discussed possible surgical repair, depending on the findings of the MRI as below. The patient wishes to proceed with the recommendations as above. Orders/referrals were placed as below at today's visit. She was placed into an Ace wrap in a cam boot, and she will avoid pain provoking activity. We also had a discussion about the risk of blood clot and thromboembolic events.  The patient understands that there is an increased risk with surgery/immobilization, and understands nothing will completely eliminate the risk of DVT/PE's, and that any prophylactic medication does not substitute for early mobilization. Given her risk profile, I have recommended the following strategy to decrease the risk of blood clots, and the patient agrees and wishes to proceed:  Aspirin 325 mg PO q Day. In order to know exactly how to proceed with treatment (surgical versus nonsurgical, as well as how), an MRI was ordered today to evaluate her peroneal tendons rerupture, as well as her second metatarsal for insufficiency fracture. This is medically necessary to evaluate the structures in this area, for both diagnosis and treatment. All questions were answered and the patient agrees with the above plan. The patient will return to clinic after the MRI has been obtained without x-rays. Next visit, we will review her MRI, and discuss the options as above. No follow-ups on file. No orders of the defined types were placed in this encounter. No orders of the defined types were placed in this encounter. Chris Damon MD  Orthopedic Surgery, Foot and Ankle        Please excuse any typos/errors, as this note was created with the assistance of voice recognition software. While intending to generate a document that actually reflects the content of the visit, the document can still have some errors including those of syntax and sound-a-like substitutions which may escape proof reading. In such instances, actual meaning can be extrapolated by context.

## 2020-09-09 NOTE — LETTER
Dr. Magaly Arroyo  [address] [facility] [address]  [phone]        9/9/2020     Patient: Violet Wilkinson  YOB: 1985    Dear Chica Tan, APR*,    I had the pleasure of seeing one of your patients, Violet Wilkinson, recently in the office. Below are the relevant portions of my assessment and plan of care. ASSESSMENT AND PLAN:  She has a left foot/ankle injury, with a possible second metatarsal insufficiency fracture, and a possible peroneal tendon rupture (unable to palpably fire her peroneal tendons, tender along the course the peroneal tendons), sustained on 8/24/2020. Notably, she has no relevant past medical history. I had a long discussion today with the patient about the likely diagnosis and its natural history, physical exam and imaging findings, as well as treatment options in detail. We discussed rest/activity modification, swelling control, NSAIDs/Acetaminophen/topical anesthetics, orthotics/shoewear modification, bracing/immobilization, injections, and physical therapy. Surgically, we discussed possible surgical repair, depending on the findings of the MRI as below. The patient wishes to proceed with the recommendations as above. Orders/referrals were placed as below at today's visit. She was placed into an Ace wrap in a cam boot, and she will avoid pain provoking activity. We also had a discussion about the risk of blood clot and thromboembolic events. The patient understands that there is an increased risk with surgery/immobilization, and understands nothing will completely eliminate the risk of DVT/PE's, and that any prophylactic medication does not substitute for early mobilization. Given her risk profile, I have recommended the following strategy to decrease the risk of blood clots, and the patient agrees and wishes to proceed:  Aspirin 325 mg PO q Day. In order to know exactly how to proceed with treatment (surgical versus nonsurgical, as well as how), an MRI was ordered today to evaluate her peroneal tendons rerupture, as well as her second metatarsal for insufficiency fracture. This is medically necessary to evaluate the structures in this area, for both diagnosis and treatment. All questions were answered and the patient agrees with the above plan. The patient will return to clinic after the MRI has been obtained without x-rays. Next visit, we will review her MRI, and discuss the options as above. Thank you for allowing me to participate in the care of this patient. I look forward to serving you and your patients again in the future. Please don't hesitate to contact me at my mobile number .         Lady Flynn MD  Orthopedic Surgery, Foot and Ankle

## 2020-09-11 ENCOUNTER — OFFICE VISIT (OUTPATIENT)
Dept: ORTHOPEDIC SURGERY | Age: 35
End: 2020-09-11
Payer: COMMERCIAL

## 2020-09-11 VITALS
SYSTOLIC BLOOD PRESSURE: 128 MMHG | HEART RATE: 90 BPM | WEIGHT: 174 LBS | HEIGHT: 63 IN | BODY MASS INDEX: 30.83 KG/M2 | DIASTOLIC BLOOD PRESSURE: 86 MMHG

## 2020-09-11 PROCEDURE — 99213 OFFICE O/P EST LOW 20 MIN: CPT | Performed by: ORTHOPAEDIC SURGERY

## 2020-09-11 NOTE — PROGRESS NOTES
[facility] [address] AND SPORTS MEDICINE  [facility] Leslie Wiseman  [address] [address]  Dept: [phone]    Ambulatory Orthopedic Consult      CHIEF COMPLAINT:    Chief Complaint   Patient presents with    Ankle Pain     Left Ankle       HISTORY OF PRESENT ILLNESS:      The patient is a 30 y.o. female who is being seen for consultation and evaluation of an injury that occurred 8/24/2020, possibly secondary to a fall (patient reports that she is unsure exactly what happened she had several drinks that day, after learning of her father-in-law passing away). The patient reports she felt pain the next day when she woke up. The pain is localized to the left dorsal midfoot and lateral hindfoot. Prior to being seen here today, the patient was seen in the emergency department. The patient reports an associated swelling. The pain is worse with activity and better with rest.     She also reports an inability to canelo her foot, not limited by pain. INTERVAL HISTORY 9/11/2020:  She is seen again today in the office for follow up of a previous issue (as above). Since being seen last, the patient is doing about the same overall. She is ambulating today using a CAM boot. The location and quality of the pain have not significantly changed since the last visit. REVIEW OF SYSTEMS:  Constitutional: Negative for fever. HENT: Negative for tinnitus. Eyes: Negative for pain. Respiratory: Negative for shortness of breath. Cardiovascular: Negative for chest pain. Gastrointestinal: Negative for abdominal pain. Genitourinary: Negative for dysuria. Skin: Negative for rash. Neurological: Negative for headaches. Hematological: Does not bruise/bleed easily. Musculoskeletal: See HPI for pertinent positives     Past Medical History:    She  has no past medical history on file.      Past Surgical History:    She  has a past surgical history that includes Ectopic pregnancy ecchymosis, no noted instability of the medial column  -Able to fire tib ant, gastrocsoleus complex, posterior tibial tendon, but unable to palpably fire peroneal tendons      RADIOLOGY:   9/11/2020 Prior images reviewed for reference. MRI images and radiology report reviewed, as below:    1. Findings above likely represent stress related marrow edema or    nondisplaced proximal 2nd metatarsal stress fracture.  Mild periosteal edema    surrounding the 2nd metatarsal.    2. Mild degenerative changes as above. 3. Mild edema in the soft tissues at the dorsum of the foot.                 FINDINGS:  Three weightbearing views (AP, Mortise, and Lateral) of the left ankle and three weightbearing views (AP, Oblique, Lateral) of the left foot were obtained in the office today and reviewed, revealing no acute dislocation, or radioopaque foreign body/tumor. The ankle mortise is maintained with no widening of the clear spaces. Overall alignment is satisfactory. Cortical irregularity at the second distal metatarsal shaft, possibly representing a fracture. IMPRESSION: Possible second metatarsal fracture as above. Electronically signed by Jovana Marie MD        Xr Foot Left (min 3 Views)  Result Date: 8/31/2020  1. No acute radiographic finding to account for patient's left foot pain. 2. Mild 1st MTP degenerative changes. ASSESSMENT AND PLAN:  Charlie Alcantar was seen today for Ankle Pain (Left Ankle)  The encounter diagnosis was Stress fracture of metatarsal bone of left foot with routine healing, subsequent encounter. Body mass index is 30.82 kg/m². She has a left foot second metatarsal insufficiency fracture, sustained on 8/24/2020. She is unable to palpably fire her peroneal tendons, and I believe this most likely secondary to muscle spasm. Notably, she has no relevant past medical history.      I had another discussion today with the patient about the likely diagnosis and its natural history, physical exam and imaging findings, as well as treatment options in detail. We discussed rest/activity modification, swelling control, NSAIDs/Acetaminophen/topical anesthetics, orthotics/shoewear modification, bracing/immobilization, injections, and physical therapy. The patient wishes to proceed with the recommendations as above. Orders/referrals were placed as below at today's visit. She will continue to avoid pain provoking activity. She will avoid NSAIDs. She will continue aspirin 325 mg p.o. daily for DVT prophylaxis. We discussed the risk of fracture displacement and subsequent surgery. I referred her to physical therapy for general strengthening and range of motion. She will wear the cam boot when ambulatory for the next 4 weeks (she reports no pain when ambulating in the boot). All questions were answered and the patient agrees with the above plan. The patient will return to clinic in 4 weeks with repeat foot x-rays. At her next visit, depending on how she is doing, we may wean her out of the cam boot and progress her activity. Return in about 4 weeks (around 10/9/2020). No orders of the defined types were placed in this encounter. No orders of the defined types were placed in this encounter. Brice Moe MD  Orthopedic Surgery, Foot and Ankle        Please excuse any typos/errors, as this note was created with the assistance of voice recognition software. While intending to generate a document that actually reflects the content of the visit, the document can still have some errors including those of syntax and sound-a-like substitutions which may escape proof reading. In such instances, actual meaning can be extrapolated by context.

## 2020-09-15 ENCOUNTER — PATIENT MESSAGE (OUTPATIENT)
Dept: FAMILY MEDICINE CLINIC | Age: 35
End: 2020-09-15

## 2020-09-15 ENCOUNTER — TELEMEDICINE (OUTPATIENT)
Dept: FAMILY MEDICINE CLINIC | Age: 35
End: 2020-09-15
Payer: COMMERCIAL

## 2020-09-15 PROCEDURE — 99213 OFFICE O/P EST LOW 20 MIN: CPT | Performed by: NURSE PRACTITIONER

## 2020-09-15 RX ORDER — ONDANSETRON 4 MG/1
4 TABLET, ORALLY DISINTEGRATING ORAL 3 TIMES DAILY PRN
Qty: 21 TABLET | Refills: 0 | Status: SHIPPED | OUTPATIENT
Start: 2020-09-15 | End: 2021-01-06

## 2020-09-15 NOTE — TELEPHONE ENCOUNTER
From: Cat Lund  To: An Terry, APRN - CNP  Sent: 9/15/2020 8:31 AM EDT  Subject: Prescription Question    Good morning,   Today is day 2 of morning nausea/vomiting. (I also used monistat the past 2 nights) I am not pregnant (my tubes have been removed). Is there a medication you recommend instead of using monistat?    Thank you,  [facility]

## 2020-09-15 NOTE — PROGRESS NOTES
9/15/2020    TELEHEALTH EVALUATION -- Audio/Visual (During [facility]-47 public health emergency)    HPI:    Jan Modi (:  1985) has requested an audio/video evaluation for the following concern(s):    Pt. Tanesha Crockett in today for discussion about new onset nausea and vomiting. She can thrown up 3 times in the past 2 days. She denies pregnancy as her tubes have been removed. She states this started after she starting using monistat ovules for a yeast infection. She has 1 more day left of med. She has taken this before with no issue. She denies diarrhea, fever, chills, abd. Pain or cough./sob. Review of Systems    Prior to Visit Medications    Medication Sig Taking? Authorizing Provider   buPROPion HCl (WELLBUTRIN PO) Take by mouth Yes Historical Provider, MD   aspirin 325 MG EC tablet Take 1 tablet by mouth daily  Patient not taking: Reported on 9/15/2020  Israle Esqueda MD   ibuprofen (ADVIL;MOTRIN) 800 MG tablet Take 1 tablet by mouth 3 times daily as needed for Pain  Violet Khoury APRN - CNP       Social History     Tobacco Use    Smoking status: Former Smoker    Smokeless tobacco: Never Used   Substance Use Topics    Alcohol use: No    Drug use: Yes     Types: Marijuana     Comment: \"last used in 2018\" 2018            PHYSICAL EXAMINATION:  [ INSTRUCTIONS:  \"[x]\" Indicates a positive item  \"[]\" Indicates a negative item  -- DELETE ALL ITEMS NOT EXAMINED]  Vital Signs: (As obtained by patient/caregiver or practitioner observation)    Constitutional: [x] Appears well-developed and well-nourished [x] No apparent distress      [] Abnormal-   Mental status  [x] Alert and awake  [x] Oriented to person/place/time [x]Able to follow commands      Eyes:  EOM    []  Normal  [] Abnormal-  Sclera  []  Normal  [] Abnormal -         Discharge [x]  None visible  [] Abnormal -    HENT:   [x] Normocephalic, atraumatic.   [] Abnormal   [x] Mouth/Throat: Mucous membranes are moist.     External Ears [x] Normal  [] Abnormal-     Neck: [] No visualized mass     Pulmonary/Chest: [x] Respiratory effort normal.  [x] No visualized signs of difficulty breathing or respiratory distress        [] Abnormal-      Musculoskeletal:   [] Normal gait with no signs of ataxia         [x] Normal range of motion of neck        [] Abnormal-       Neurological:        [] No Facial Asymmetry (Cranial nerve 7 motor function) (limited exam to video visit)          [] No gaze palsy        [] Abnormal-         Skin:        [] No significant exanthematous lesions or discoloration noted on facial skin         [] Abnormal-            Psychiatric:       [x] Normal Affect [x] No Hallucinations        [] Abnormal-     Other pertinent observable physical exam findings-     ASSESSMENT/PLAN:  1. Nausea  Small rx of zofran sent over to use during last days of monistat  Risk for nausea also noted with diflucan  Call INB or worsening  Clear liq. Diet if needed and advance as tolerated     No follow-ups on file. Asuncion Leiva is a 30 y.o. female being evaluated by a Virtual Visit (video visit) encounter to address concerns as mentioned above. A caregiver was present when appropriate. Due to this being a TeleHealth encounter ([facility]), evaluation of the following organ systems was limited: Vitals/Constitutional/EENT/Resp/CV/GI//MS/Neuro/Skin/Heme-Lymph-Imm. Pursuant to the emergency declaration under the [facility], [address] [place] [address] authority and the [employer] and [employer] Act, this Virtual Visit was conducted with patient's (and/or legal guardian's) consent, to reduce the patient's risk of exposure to COVID-19 and provide necessary medical care.   The patient (and/or legal guardian) has also been advised to contact this office for worsening conditions or problems, and seek emergency medical treatment and/or call 911 if deemed necessary. Patient identification was verified at the start of the visit: Yes    Total time spent on this encounter: Not billed by time    Services were provided through a video synchronous discussion virtually to substitute for in-person clinic visit. Patient and provider were located at their individual homes. --MARINA Gama CNP on 9/15/2020 at 11:40 AM    An electronic signature was used to authenticate this note.

## 2020-09-15 NOTE — PROGRESS NOTES
Visit Information    Have you changed or started any medications since your last visit including any over-the-counter medicines, vitamins, or herbal medicines? no   Have you stopped taking any of your medications? Is so, why? -  no  Are you having any side effects from any of your medications? - no    Have you seen any other physician or provider since your last visit?  no   Have you had any other diagnostic tests since your last visit?  no   Have you been seen in the emergency room and/or had an admission in a hospital since we last saw you?  no   Have you had your routine dental cleaning in the past 6 months?  no     Do you have an active MyChart account? If no, what is the barrier?   Yes    Patient Care Team:  MARINA Gama CNP as PCP - General (Family Nurse Practitioner)  MARINA Starr CNP as PCP - [facility]  Yohan Walls MD as Obstetrician (Perinatology)    Medical History Review  Past Medical, Family, and Social History reviewed and  contribute to the patient presenting condition    Health Maintenance   Topic Date Due    Varicella vaccine (1 of 2 - 2-dose childhood series) 07/13/1987    Flu vaccine (1) 09/01/2020    Cervical cancer screen  05/03/2023    DTaP/Tdap/Td vaccine (3 - Td) 02/27/2029    HIV screen  Completed    Hepatitis A vaccine  Aged Out    Hepatitis B vaccine  Aged Out    Hib vaccine  Aged Out    Meningococcal (ACWY) vaccine  Aged Out    Pneumococcal 0-64 years Vaccine  Aged Out

## 2020-09-17 ENCOUNTER — HOSPITAL ENCOUNTER (OUTPATIENT)
Dept: PHYSICAL THERAPY | Facility: CLINIC | Age: 35
Setting detail: THERAPIES SERIES
Discharge: HOME | End: 2020-09-17
Payer: COMMERCIAL

## 2020-09-17 PROCEDURE — 97110 THERAPEUTIC EXERCISES: CPT

## 2020-09-17 PROCEDURE — 97140 MANUAL THERAPY 1/> REGIONS: CPT

## 2020-09-17 PROCEDURE — 97161 PT EVAL LOW COMPLEX 20 MIN: CPT

## 2020-09-17 NOTE — CONSULTS
[x] [address] [facility]  Outpatient Rehabilitation &  Therapy  [address]  [address]  P: [phone]  F: [phone]     Physical Therapy Lower Extremity Evaluation    Date:  2020  Patient: Gene Addison  : 1985  MRN: [id number]  Physician: Dr. Devonte Watsonment: MMO  Medical Diagnosis: M84.375D (ICD-10-CM) - Stress fracture of metatarsal bone of left foot with routine healing  Rehab Codes: M25.572, M25.475, M25.675, R26.2NEC, M62.572  Onset date: 2020   Next Dr's appt.: 10/9/2020    Subjective:   CC/HPI: Pt is a 30 y.o. female who sustained a stress fx of the 2nd met like due to a fall. Pt followed up with a foot/ankle surgeon 3 weeks following the injury and was placed in a CAM boot and was told to \"stay off the foot. \" Pt reports an MRI was ordered secondary to suspicion of a torn tendon in the left foot. Per the MRI, no tendon damage is noted. Pt notes since onset of injury, pt feels like her pain is worsening. Pt notes the pain is located in the top of her left foot and is more of an annoying pain. Pt describes the pain as a dull ache that is constant. Pt notes her pain can increase to a 4/10. Pt notes swelling fluctuates and is the worst at night. Pt states her pain is the worst at night and with activity. Pt states walking on the foot is painful, therefore, she walks on her heels and keeps the toes elevate. Pt states elevating the foot assists with pain and she is able to ice the left foot 1-2x/day. Pt denies the presence of numbness/tingling in the left foot. Pt notes she is not taking any medication for pain but does use aspirin for DVT prophylaxis. Pt notes she has done some walking on the left foot without the CAM boot on and she is not sleeping in the boot. Pt does state due to walking in the CAM boot, her back is more painful and she also ordered herself a smaller boot to walk around her house in.     PMHx: [x] Unremarkable [] Diabetes [] HTN  [] Pacemaker   [] Sitting [x] [] []   Standing [] [x] []   Ambulation [] [x] []   Groom/Dress [x] [] []   Lift/Carry [] [x] []   Stairs [] [x] []   Bending [] [x] []   Squat [] [x] []   Kneel [] [x] []       Squsherman Valentin is a 30 y.o. female who sustained a stress fx of the 2nd met like due to a fall. Pt followed up with a foot/ankle surgeon 3 weeks following the injury and was placed in a CAM boot. Pt reports an MRI was ordered secondary to suspicion of a torn tendon in the left foot. Per the MRI, no tendon damage is noted. Pt presents this date with tenderness along the 2nd met, increased edema over the dorsum of the left foot, and decreased footflat WBing on the LLE. Pt presents with decreased muscle activation of the evertors with strength at grossly 2-/5 and gross ankle/foot strength a 3-/5. Pt also presents with gastroc equinus on the LLE and decreased AROM of the left foot/ankle. At this time, pt will benefit from skilled therapeutic interventions including manual therapy, therapeutic exercise, and gait training in order to improve pain, ROM, and muscle activation in order to progress to prior level of activity. Pt requires education on benefits of CAM boot and will monitor compliance in order to promote healing. Problems:    [x] ? Pain: 0-4/10  [x] ? ROM: dec AROM  [x] ? Strength: average: 3-/5 left foot/ankle; 2-/5 evertor  [x] ? Function: LEFI: 92.5% - in CAM boot  [x] ? Balance: dec flatfoot WBing on LLE  [x] Edema: edema on dorsum of left foot  [x] Gait Deviations: ambulating in CAM boot        STG: (to be met in 6 treatments)  1. ? Pain:  Pt will report less than or equal to 2-3/10 left foot pain when walking and standing in the CAM boot and at night when sleeping in order to improve tolerance to activity. 2. ? Flexibility: Pt will demonstrate a decrease in gastroc tightness to less than or equal to 5 deg PF contracture in order to assist with ambulating and foot/ankle mechanics. 3. ?   AROM: Pt will improve left ankle AROM in order to assist with ambulating and general mobility. a. Inversion: 30 deg  b. Eversion: 10 deg  c. DF: 10 deg  d. PF: 55 deg  4. ? Strength: Pt will demonstrate left ankle eversion to greater than or equal to 3/5 muscle strength in order to demonstrate improvements in muscle activation and ankle motion to assist with mobility. 5. ? Function: Pt will demonstrate compliance with CAM boot in order to promote healing. 6. Independent with Home Exercise Programs  7. Demonstrate Knowledge of fall prevention    LTG: (to be met in 12 treatments)  1. ? Pain:  Pt will report less than or equal to 1-2/10 left foot pain when walking, standing, and negotiating stairs without CAM boot and at night when sleeping in order to improve tolerance to activity. 2. ? Flexibility: Pt will demonstrate a decrease in gastroc tightness to less than or equal to 0 deg PF contracture in order to assist with ambulating and foot/ankle mechanics. 3.  ? AROM: Pt will improve left ankle AROM in order to assist with ambulating and general mobility. a. Inversion: 40 deg  b. Eversion: 20 deg  c. PF: 60 deg  4. ? Strength: Pt will demonstrate 5/5 left ankle/foot strength in order to assist with gait mechanics and overall mobility to progress to prior level of activity. 5. ? Gait: Pt will be able to ambulate without the CAM boot while demonstrating heel strike and push-off through the 1st met in order to promote an efficient gait pattern. 6. ? Function:Pt will score greater than or equal to 100% on the LEFI in order to demonstrate improved function without her CAM boot.                    Patient goals: \"improve mobility\"     Rehab Potential:  [x] Good  [] Fair  [] Poor   Suggested Professional Referral:  [x] No  [] Yes:  Barriers to Goal Achievement[de-identified]  [x] No  [] Yes:  Domestic Concerns:  [x] No  [] Yes:    Pt. Education:  [x] Plans/Goals, Risks/Benefits discussed  [x] Home exercise program    Method of the calf (gastroc + Achilles), ROM, light strengthening, and gait training. Modalities PRN. Please teach home protocol. Evaluation Complexity:  History (Personal factors, comorbidities) [] 0 [x] 1-2 [] 3+   Exam (limitations, restrictions) [] 1-2 [] 3 [x] 4+   Clinical presentation (progression) [x] Stable [] Evolving  [] Unstable   Decision Making [x] Low [] Moderate [] High    [x] Low Complexity [] Moderate Complexity [] High Complexity       Treatment Charges: Mins Units   [x] Evaluation       [x]  Low       []  Moderate       []  High 35 1   []  Modalities     [x]  Ther Exercise 12 1   [x]  Manual Therapy 11 1   []  Ther Activities     []  Aquatics     []  Vasocompression     []  Other       TOTAL TREATMENT TIME: 58    Time in: 955   Time Out: 1105    Electronically signed by: Melchor Carlson PT        Physician Signature:________________________________Date:__________________  By signing above or cosigning this note, I have reviewed this plan of care and certify a need for medically necessary rehabilitation services.      *PLEASE SIGN ABOVE AND FAX BACK ALL PAGES*

## 2020-09-17 NOTE — FLOWSHEET NOTE
[facility] Fall Risk Assessment    Patient Name:  Melissa Berg  : 1985        Risk Factor Scale  Score   History of Falls [] Yes  [x] No 25  0 0   Secondary Diagnosis [] Yes  [x] No 15  0 0   Ambulatory Aid [] Furniture  [x] Crutches/cane/CAM walker  [] None/bedrest/wheelchair/nurse 30  15  0 15   IV/Heparin Lock [] Yes  [x] No 20  0 0   Gait/Transferring [] Impaired  [] Weak  [x] Normal/bedrest/immobile 20  10  0 0   Mental Status [] Forgets limitations  [x] Oriented to own ability 15  0 0      Total:15     Based on the Assessment score: check the appropriate box.     [x]  No intervention needed   Low =   Score of 0-24    []  Use standard prevention interventions Moderate =  Score of 24-44   [] Give patient handout and discuss fall prevention strategies   [] Establish goal of education for patient/family RE: fall prevention strategies    []  Use high risk prevention interventions High = Score of 45 and higher   [] Give patient handout and discuss fall prevention strategies   [] Establish goal of education for patient/family Re: fall prevention strategies   [] Discuss lifeline / other resources    Electronically signed by:   Enoc Jolley PT  Date: 2020

## 2020-09-22 ENCOUNTER — HOSPITAL ENCOUNTER (OUTPATIENT)
Dept: PHYSICAL THERAPY | Facility: CLINIC | Age: 35
Setting detail: THERAPIES SERIES
Discharge: HOME | End: 2020-09-22
Payer: COMMERCIAL

## 2020-09-22 PROCEDURE — 97140 MANUAL THERAPY 1/> REGIONS: CPT

## 2020-09-22 PROCEDURE — 97016 VASOPNEUMATIC DEVICE THERAPY: CPT

## 2020-09-22 PROCEDURE — 97110 THERAPEUTIC EXERCISES: CPT

## 2020-09-22 NOTE — FLOWSHEET NOTE
[] [facility]) - [facility] &  Therapy  [address]  [phone]  F: [phone] [] [address]  [facility]   [address]  P: [phone]  F: [phone] [] [address] &  Therapy  [address]  P: [phone]  F: [phone] [] [address]  [facility]   [address]   [place]: [phone]  F: [phone]      Physical Therapy Daily Treatment Note    Date:  2020  Patient Name:  Aniya Pak    :  1985  MRN: [id number]  Physician: Dr. Sterling Chavarria: MMO  Medical Diagnosis: M84.375D (ICD-10-CM) - Stress fracture of metatarsal bone of left foot with routine healing  Rehab Codes: M25.572, M25.475, M25.675, R26.2NEC, M62.572  Onset date: 2020                                   Next Dr's appt.: 10/9/2020  Visit# / total visits: ; Cancels/No Shows: 0/0    Subjective:    Pain:  [] Yes  [] No Location: left foot Pain Rating: (0-10 scale) 0/10; 4/10  Pain altered Tx:  [] No  [] Yes  Action:  Comments: the pt reports her foot is fine but her leg and her back are hurting. Pt notes it is her left calf that is painful. Pt reports she is waking up with \"alka horses. \"    Objective:  Modalities: GAMEREADY to left foot/ankle, mod compression; 10'; CP 10' to left calf  Precautions: allergy: adhesive tape; WBAT in CAM boot  Exercises:  Exercise       Reps/ Time Weight/ Level Comments   Manual therapy 17'   MFR to anterior tibialis, peroneal tendons, and gastroc (medial more tender, lateral increased tightness)  Hypervolt to calf- cushion head, low speed (2')  PROM of the left ankle/foot   Calf stretch 5x30\" strap Use of towel for support of the foot      Ankle pumps 10x3       eversion 10x A    Ankle circles  10x ea A    Alphabet  1 cycle Ankle circles 1x      4-way ankle 10x ea lime    BAPS- seated 10x ea lv1 A/P, M/L, CW/CCW   Toe yoga 10x   reviewed   domers 10x                 Education x   Educated pt on when to wear her CAM boot (ambulating/WBing) and educated to take boot off when sitting in order to work on ankle mobility. Educated on activity modifications based on pain.     Even up: to improve LLD     Compression stockings to assist with edema control/management     Sleeping position: educated on different sleeping positions to assist with improved tolerance and to use ice prior to going to bed to assist with night pain.       Specific Instructions for next treatment: review HEP and compliance with CAM boot; stretching of the calf (gastroc + Achilles), ROM, light strengthening, and gait training. Modalities PRN. Please teach home protocol. Add in [facility] 82 core strengthening     Treatment Charges: Mins Units   [x]  Modalities CP 10 0   [x]  Ther Exercise 25 2   [x]  Manual Therapy 17 1   []  Ther Activities     []  Aquatics     []  Vasocompression 10 1   []  Other     Total Treatment time 52 4       Assessment: [x] Progressing toward goals. Initiated treatment with manual therapy secondary to complaints of left calf pain. Poor to fair tolerance to manual therapy to the anterior compartment (proximal) and gastroc/evertor musculature. Attempted to complete MFR with the Hypervolt to gauge tolerance, however, pt does not tolerate this well. Pt is able to demonstrate improvements in evertor muscle activation. Pt is bale to complete with visual cueing for a target and initial movement is delayed but improves with reps. Improved execution of ankle circles and ability to progress to ankle alphabet completion. Progressed to seated BAPs board to improve ankle mobility with increased difficulty with eccentric evertor activities.  Pt requires increased focus on completing circles with the BAPs board to achieve inversion and control for eccentric eversion. Pt with some pain in the 2nd met with domers secondary to compensatory toe flexion and pt cued visually and verbally to decrease toe flexion compensation and isolate intrinsic foot musculature activation. Ended treatment with vasocompression to address edema in the left foot and calf pain. Pt continues to benefit from skilled therapeutic interventions in order to improve left foot pain and promote healing to progress to prior level of activity. [] No change. [x] Other: Provided pt with low back stretches secondary to increased pain with wearing CAM boot    Problems:    [x]? ? Pain: 0-4/10  [x]? ? ROM: dec AROM  [x]? ? Strength: average: 3-/5 left foot/ankle; 2-/5 evertor  [x]? ? Function: LEFI: 92.5% - in CAM boot  [x]? ? Balance: dec flatfoot WBing on LLE  [x]? Edema: edema on dorsum of left foot  [x]?  Gait Deviations: ambulating in CAM boot                              STG: (to be met in 6 treatments)  1. ? Pain:  Pt will report less than or equal to 2-3/10 left foot pain when walking and standing in the CAM boot and at night when sleeping in order to improve tolerance to activity. 2. ? Flexibility: Pt will demonstrate a decrease in gastroc tightness to less than or equal to 5 deg PF contracture in order to assist with ambulating and foot/ankle mechanics. 3. ? AROM: Pt will improve left ankle AROM in order to assist with ambulating and general mobility. a. Inversion: 30 deg  b. Eversion: 10 deg  c. DF: 10 deg  d. PF: 55 deg  4. ? Strength: Pt will demonstrate left ankle eversion to greater than or equal to 3/5 muscle strength in order to demonstrate improvements in muscle activation and ankle motion to assist with mobility. 5. ? Function: Pt will demonstrate compliance with CAM boot in order to promote healing. 6. Independent with Home Exercise Programs  7.  Demonstrate Knowledge of fall prevention     LTG: (to be met in 12 treatments)  1. ? Pain:  Pt will report less than or equal to 1-2/10 left foot pain when walking, standing, and negotiating stairs without CAM boot and at night when sleeping in order to improve tolerance to activity. 2. ? Flexibility: Pt will demonstrate a decrease in gastroc tightness to less than or equal to 0 deg PF contracture in order to assist with ambulating and foot/ankle mechanics. 3.  ? AROM: Pt will improve left ankle AROM in order to assist with ambulating and general mobility. a. Inversion: 40 deg  b. Eversion: 20 deg  c. PF: 60 deg  4. ? Strength: Pt will demonstrate 5/5 left ankle/foot strength in order to assist with gait mechanics and overall mobility to progress to prior level of activity. 5. ? Gait: Pt will be able to ambulate without the CAM boot while demonstrating heel strike and push-off through the 1st met in order to promote an efficient gait pattern. 6. ? Function:Pt will score greater than or equal to 100% on the LEFI in order to demonstrate improved function without her CAM boot.                    Patient goals: \"improve mobility\"     Pt. Education:  [x] Yes  [] No  [] Reviewed Prior HEP/Ed  Method of Education: [x] Verbal  [x] Demo  [x] Written- 4-way ankle and back stretches  Comprehension of Education:  [x] Verbalizes understanding. [] Demonstrates understanding. [] Needs review. [x] Demonstrates/verbalizes HEP/Ed previously given. Plan: [x] Continue current frequency toward long and short term goals.     [x] Specific Instructions for subsequent treatments: see above      Time In:1000            Time Out: 4241    Electronically signed by:  Yola eMsa PT

## 2020-09-25 ENCOUNTER — HOSPITAL ENCOUNTER (OUTPATIENT)
Dept: PHYSICAL THERAPY | Facility: CLINIC | Age: 35
Setting detail: THERAPIES SERIES
Discharge: HOME | End: 2020-09-25
Payer: COMMERCIAL

## 2020-09-25 PROCEDURE — 97110 THERAPEUTIC EXERCISES: CPT

## 2020-09-25 PROCEDURE — 97140 MANUAL THERAPY 1/> REGIONS: CPT

## 2020-09-25 PROCEDURE — 97016 VASOPNEUMATIC DEVICE THERAPY: CPT

## 2020-09-25 NOTE — FLOWSHEET NOTE
[] [facility]) - [facility] &  Therapy  [address]  [phone]  F: [phone] [] [address]  [facility] [address]   [address]  P: [phone]  F: [phone] [] [address] &  Therapy  [address] [facility]  P: [phone]  F: [phone] [] [address]  [facility]   [address]   [place]: [phone]  F: [phone]      Physical Therapy Daily Treatment Note    Date:  2020  Patient Name:  Ezekiel Roque    :  1985  MRN: [id number]  Physician: Dr. Caryle Handing: KAREEMO  Medical Diagnosis: M84.375D (ICD-10-CM) - Stress fracture of metatarsal bone of left foot with routine healing  Rehab Codes: M25.572, M25.475, M25.675, R26.2NEC, M62.572  Onset date: 2020                                   Next 's appt.: 10/9/2020  Visit# / total visits:3/12; Cancels/No Shows: 0/0    Subjective:    Pain:  [] Yes  [] No Location: left foot Pain Rating: (0-10 scale) 0/10; 4/10  Pain altered Tx:  [] No  [] Yes  Action:  Comments: Pt continues to note calf pain and it remains constant. Pt states she was in the hot tub for about 30 minutes yesterday due to calf pain and \"maybe it helped. \" Pt notes the calf pain is constant and it feels like a big alka horse. Pt denies any chest pains. Calf pain started with wearing the boot. Pt notes her  did get a massager and he did that yesterday and she is not sure if this helped.  Pt notes she continues to take her aspirin and wear her compression stockings     Objective:  Modalities: HP to left calf prior to manual therapy 10'  GAMEREADY to left foot/ankle, mod compression; 10'; CP 10' to left calf  Precautions: allergy: adhesive tape; WBAT in CAM boot  Exercises:  Exercise       Reps/ Time Weight/ Level Comments standing, and negotiating stairs without CAM boot and at night when sleeping in order to improve tolerance to activity. 2. ? Flexibility: Pt will demonstrate a decrease in gastroc tightness to less than or equal to 0 deg PF contracture in order to assist with ambulating and foot/ankle mechanics. 3.  ? AROM: Pt will improve left ankle AROM in order to assist with ambulating and general mobility. a. Inversion: 40 deg  b. Eversion: 20 deg  c. PF: 60 deg  4. ? Strength: Pt will demonstrate 5/5 left ankle/foot strength in order to assist with gait mechanics and overall mobility to progress to prior level of activity. 5. ? Gait: Pt will be able to ambulate without the CAM boot while demonstrating heel strike and push-off through the 1st met in order to promote an efficient gait pattern. 6. ? Function:Pt will score greater than or equal to 100% on the LEFI in order to demonstrate improved function without her CAM boot.                    Patient goals: \"improve mobility\"     Pt. Education:  [x] Yes  [] No  [] Reviewed Prior HEP/Ed  Method of Education: [x] Verbal  [] Demo  [] Written- 4-way ankle and back stretches  Comprehension of Education:  [x] Verbalizes understanding. [] Demonstrates understanding. [] Needs review. [x] Demonstrates/verbalizes HEP/Ed previously given. Plan: [x] Continue current frequency toward long and short term goals.     [x] Specific Instructions for subsequent treatments: see above      Time In: 805           Time Out: 921    Electronically signed by:  Will Mcneill, PT

## 2020-09-29 ENCOUNTER — HOSPITAL ENCOUNTER (OUTPATIENT)
Dept: PHYSICAL THERAPY | Facility: CLINIC | Age: 35
Setting detail: THERAPIES SERIES
Discharge: HOME | End: 2020-09-29
Payer: COMMERCIAL

## 2020-09-29 NOTE — FLOWSHEET NOTE
[] [address]  [address]    [phone]  F: [phone]   [x] [address] [facility] [address]   [address]  P: [phone]  F: [phone]  [] [address]  [address] [facility]  P: [phone]  F: [phone]  [] [address]  [facility]   [place]: [phone]  F: [phone]   [] [place] [id number] [facility] [address]  [place]: [phone]   F: [phone]     Physical Therapy Cancel/No Show note    Date: 2020  Patient: Jimmie Castaneda  : 1985  MRN: [id number]    Cancels/No Shows to date:     For today's appointment patient:    [x]  Cancelled    [] Rescheduled appointment    [] No-show     Reason given by patient:    []  Patient ill    []  Conflicting appointment    [] No transportation      [] Conflict with work    [] No reason given    [] Weather related    [] COVID-19    [x] Other:      Comments:  Pt cx d/t kids having a fever.        [x] Next appointment was confirmed    Electronically signed by: Jett Silva PTA

## 2020-10-02 ENCOUNTER — PATIENT MESSAGE (OUTPATIENT)
Dept: FAMILY MEDICINE CLINIC | Age: 35
End: 2020-10-02

## 2020-10-04 NOTE — TELEPHONE ENCOUNTER
From: Miah Munoz  To: MARINA Macdonald - CNP  Sent: 10/2/2020 2:29 PM EDT  Subject: Prescription Question    Hello,  We were just there Wed I think. Avelino's results were positive for strep. As of today my throat hurts now too. Can you send a script to [facility] for me too please?   Thank you,  Anita Patel

## 2020-10-05 ENCOUNTER — PATIENT MESSAGE (OUTPATIENT)
Dept: FAMILY MEDICINE CLINIC | Age: 35
End: 2020-10-05

## 2020-10-05 RX ORDER — AMOXICILLIN 875 MG/1
875 TABLET, COATED ORAL 2 TIMES DAILY
Qty: 20 TABLET | Refills: 0 | Status: SHIPPED | OUTPATIENT
Start: 2020-10-05 | End: 2020-10-15

## 2020-10-05 NOTE — TELEPHONE ENCOUNTER
From: Abdelrahman Sykes  To: MARINA Leung CNP  Sent: 10/5/2020 10:47 AM EDT  Subject: Prescription Question    Hello,  Jyoti I thought I that to the dr. Martell Roque we saw when we brought all the kids in. I am so confused! Thank you! Theone Julio      ----- Message -----   MARINA Catherine CNP   Sent:10/5/2020 9:01 AM EDT   To:Amparo Damon   Subject:RE: Prescription Question    Yes I can. But please utilize an evisit for problems like this in the future  navi      ----- Message -----   From:Amparo Damon   Sent:10/2/2020 2:29 PM EDT   To:MARINA Chavez CNP   Subject:Prescription Question    Hello,  We were just there Wed I think. Avelino's results were positive for strep. As of today my throat hurts now too. Can you send a script to [facility] for me too please?   Thank you,  Greg Mesa

## 2020-10-06 ENCOUNTER — HOSPITAL ENCOUNTER (OUTPATIENT)
Dept: PHYSICAL THERAPY | Facility: CLINIC | Age: 35
Setting detail: THERAPIES SERIES
Discharge: HOME | End: 2020-10-06
Payer: COMMERCIAL

## 2020-10-06 PROCEDURE — 97016 VASOPNEUMATIC DEVICE THERAPY: CPT

## 2020-10-06 PROCEDURE — 97140 MANUAL THERAPY 1/> REGIONS: CPT

## 2020-10-06 PROCEDURE — 97110 THERAPEUTIC EXERCISES: CPT

## 2020-10-06 NOTE — FLOWSHEET NOTE
[] [facility]) - [facility] &  Therapy  [address]  [phone]  F: [phone] [x] [address] [facility] [address]   [address]  P: [phone]  F: [phone] [] [address] &  Therapy  [address] [facility]  P: [phone]  F: [phone] [] [address]  [facility]   [address]   [place]: [phone]  F: [phone]      Physical Therapy Daily Treatment Note    Date:  10/6/2020  Patient Name:  Isael Dietz    :  1985  MRN: [id number]  Physician: Dr. Anna Marie Clarke: MMO  Medical Diagnosis: M84.375D (ICD-10-CM) - Stress fracture of metatarsal bone of left foot with routine healing  Rehab Codes: M25.572, M25.475, M25.675, R26.2NEC, M62.572  Onset date: 2020                                   Next 's appt.: 10/9/2020  Visit# / total visits:; Cancels/No Shows: 1/0    Subjective:    Pain:  [x] Yes  [] No Location: left foot Pain Rating: (0-10 scale) 2/10  Pain altered Tx:  [x] No  [] Yes  Action:    Comments: pt states she is not as painful. Describes feeling \"sore\" on the dorsum of her L foot. Overall pt reports feeling much improved and is hoping to be released from the boot and PT when she follows up with Dr. Cristhian Joseph Friday. Pt also admits that she has been going without the boot at home at times.      Objective:  Modalities: HP to left calf prior to manual therapy 10'  GAMEREADY to left foot/ankle, mod compression; 10'; CP 10' to left calf  Precautions: allergy: adhesive tape; WBAT in CAM boot  Exercises:  Exercise       Reps/ Time Weight/ Level Comments   Manual therapy 14'   MFR to peroneal tendons, and gastroc (medial more tender, lateral increased tightness)   Calf stretch 5x30\" strap Use of towel for support of the foot      Ankle pumps 10x3       eversion 10x A            Alphabet  1 cycle     Ankle circles 1x      4-way ankle 10x2 ea lime    BAPS- seated 10x ea lv1 A/P, M/L, CW/CCW   Toe yoga 10x   reviewed   domers 10x                 Education x   Signs of a blood clot   Assessment 9/25  Palpation in the calf: more tender on the medial aspect of the calf vs lateral (both sides tender)  - decreased tenderness is noted with calf stretch  - pedal pulse is palpated  - no temperature changes or warmth  Observation: no color changes or observed swelling in calf/LLE  Movement: greater medial calf pain with straight leg DF  - less calf pain with left knee joint in flexion    Specific Instructions for next treatment: review HEP and compliance with CAM boot; stretching of the calf (gastroc + Achilles), ROM, light strengthening, and gait training. Modalities PRN. Please teach home protocol. Add in [employer] 82 core strengthening     Treatment Charges: Mins Units   [x]  Modalities CP  HP 10  10 0  0   [x]  Ther Exercise 40 2   [x]  Manual Therapy 14 1   []  Ther Activities     []  Aquatics     [x]  Vasocompression 10 1   []  Other     Total Treatment time 64 4       Assessment: [x] Progressing toward goals. During manual pt continues to be more tender in the medial calf. Able to tolerate moderate to deeper pressure. Pt also with increased tenderness at peroneal tendons only tolerating light pressure. During exercises pt describes her calf to feel \"burning\" during calf stretch. Pt is challenged with most exercises reporting discomfort into eversion and with BAPS board counter clockwise circles. [] No change. [] Other:    Problems:    [x]? ? Pain: 0-4/10  [x]? ? ROM: dec AROM  [x]? ? Strength: average: 3-/5 left foot/ankle; 2-/5 evertor  [x]? ? Function: LEFI: 92.5% - in CAM boot  [x]? ? Balance: dec flatfoot WBing on LLE  [x]? Edema: edema on dorsum of left foot  [x]?  Gait Deviations: ambulating in CAM boot                              STG: (to be met in 6 treatments)  1. ? Pain:  Pt will report less than or equal to 2-3/10 left foot pain when walking and standing in the CAM boot and at night when sleeping in order to improve tolerance to activity. 2. ? Flexibility: Pt will demonstrate a decrease in gastroc tightness to less than or equal to 5 deg PF contracture in order to assist with ambulating and foot/ankle mechanics. 3. ? AROM: Pt will improve left ankle AROM in order to assist with ambulating and general mobility. a. Inversion: 30 deg  b. Eversion: 10 deg  c. DF: 10 deg  d. PF: 55 deg  4. ? Strength: Pt will demonstrate left ankle eversion to greater than or equal to 3/5 muscle strength in order to demonstrate improvements in muscle activation and ankle motion to assist with mobility. 5. ? Function: Pt will demonstrate compliance with CAM boot in order to promote healing. 6. Independent with Home Exercise Programs  7. Demonstrate Knowledge of fall prevention     LTG: (to be met in 12 treatments)  1. ? Pain:  Pt will report less than or equal to 1-2/10 left foot pain when walking, standing, and negotiating stairs without CAM boot and at night when sleeping in order to improve tolerance to activity. 2. ? Flexibility: Pt will demonstrate a decrease in gastroc tightness to less than or equal to 0 deg PF contracture in order to assist with ambulating and foot/ankle mechanics. 3.  ? AROM: Pt will improve left ankle AROM in order to assist with ambulating and general mobility. a. Inversion: 40 deg  b. Eversion: 20 deg  c. PF: 60 deg  4. ? Strength: Pt will demonstrate 5/5 left ankle/foot strength in order to assist with gait mechanics and overall mobility to progress to prior level of activity. 5. ? Gait: Pt will be able to ambulate without the CAM boot while demonstrating heel strike and push-off through the 1st met in order to promote an efficient gait pattern.    6. ? Function:Pt will score greater than or equal to 100% on the LEFI in order to demonstrate improved function without her CAM boot.                    Patient goals: \"improve mobility\"     Pt. Education:  [] Yes  [x] No  [] Reviewed Prior HEP/Ed  Method of Education: [] Verbal  [] Demo  [] Written- 4-way ankle and back stretches  Comprehension of Education:  [] Verbalizes understanding. [] Demonstrates understanding. [] Needs review. [x] Demonstrates/verbalizes HEP/Ed previously given. Plan: [x] Continue current frequency toward long and short term goals.     [x] Specific Instructions for subsequent treatments: see above      Time In: 5:30pm          Time Out: 6:35pm    Electronically signed by:  Shelli Perez PTA

## 2020-10-08 ENCOUNTER — HOSPITAL ENCOUNTER (OUTPATIENT)
Dept: PHYSICAL THERAPY | Facility: CLINIC | Age: 35
Setting detail: THERAPIES SERIES
Discharge: HOME | End: 2020-10-08
Payer: COMMERCIAL

## 2020-10-08 PROCEDURE — 97110 THERAPEUTIC EXERCISES: CPT

## 2020-10-08 PROCEDURE — 97140 MANUAL THERAPY 1/> REGIONS: CPT

## 2020-10-08 PROCEDURE — 97016 VASOPNEUMATIC DEVICE THERAPY: CPT

## 2020-10-08 NOTE — FLOWSHEET NOTE
[] [facility]) - [facility] &  Therapy  [address]  [phone]  F: [phone] [x] [address]  [facility]   [address]  P: [phone]  F: [phone] [] [address] &  Therapy  [address] [facility]  P: [phone]  F: [phone] [] [address]  [facility]   [address]   [place]: [phone]  F: [phone]      Physical Therapy Daily Treatment Note    Date:  10/8/2020  Patient Name:  Pillo Craft    :  1985  MRN: [id number]  Physician: Dr. Chuy Mcfadedn: SHARIFA  Medical Diagnosis: M84.375D (ICD-10-CM) - Stress fracture of metatarsal bone of left foot with routine healing  Rehab Codes: M25.572, M25.475, M25.675, R26.2NEC, M62.572  Onset date: 2020                                   Next Dr's appt.: 10/9/2020  Visit# / total visits:;     Cancels/No Shows: 1/0    Subjective:    Pain:  [x] Yes  [] No Location: left foot Pain Rating: (0-10 scale) 2/10  Pain altered Tx:  [x] No  [] Yes  Action:    Comments:     Objective:  Modalities: not today-HP to left calf prior to manual therapy 10'  GAMEREADY to left foot/ankle, mod compression; 10'; CP 10' to left calf  Precautions: allergy: adhesive tape; WBAT in CAM boot  Exercises:  Exercise       Reps/ Time Weight/ Level Comments   nustep  6min   Boot on          Manual therapy 14'   MFR to peroneal tendons, and gastroc (medial more tender, lateral increased tightness)   Calf stretch 5x30\" strap Use of towel for support of the foot      Ankle pumps 10x3       eversion 10x A            Alphabet  1 cycle     Ankle circles 1x      4-way ankle 10x2 ea lime    BAPS- seated 10x ea lv2 A/P, M/L, CW/CCW   Toe yoga 10x   reviewed   domers 10x             Planks from knees  3x15\"     Side planks from knees 3x15\"ea  Cuing for technique    Gait   1 lap    boot on, good technique    Education x   Signs of a blood clot   Assessment 9/25  Palpation in the calf: more tender on the medial aspect of the calf vs lateral (both sides tender)  - decreased tenderness is noted with calf stretch  - pedal pulse is palpated  - no temperature changes or warmth  Observation: no color changes or observed swelling in calf/LLE  Movement: greater medial calf pain with straight leg DF  - less calf pain with left knee joint in flexion    Specific Instructions for next treatment: review HEP and compliance with CAM boot; stretching of the calf (gastroc + Achilles), ROM, light strengthening, and gait training. Modalities PRN. Please teach home protocol. Add in [employer] 82 core strengthening     Treatment Charges: Mins Units   [x]  Modalities CP 10 0   [x]  Ther Exercise 40 3   [x]  Manual Therapy 14 1   []  Ther Activities     []  Aquatics     [x]  Vasocompression 10 1   []  Other     Total Treatment time 64 5       Assessment: [x] Progressing toward goals. Pt still with the burning feeling in the medial calf. This is getting better per pt. ROM and strength are improving. Able to add core strengthening and some ambulation today without any issues. See STGs of same date. [] No change. [] Other:    Problems:    [x]? ? Pain: 0-4/10  [x]? ? ROM: dec AROM  [x]? ? Strength: average: 3-/5 left foot/ankle; 2-/5 evertor  [x]? ? Function: LEFI: 92.5% - in CAM boot  [x]? ? Balance: dec flatfoot WBing on LLE  [x]? Edema: edema on dorsum of left foot  [x]?  Gait Deviations: ambulating in CAM boot                              STG: (to be met in 6 treatments)  1. ? Pain:  Pt will report less than or equal to 2-3/10 left foot pain when walking and standing in the CAM boot and at night when sleeping in order to improve tolerance to activity.  MET 3/10   2. ? Flexibility: Pt will demonstrate a decrease in gastroc tightness to less than or equal to 5 deg PF contracture in order to assist with ambulating and foot/ankle mechanics. 3. ? AROM: Pt will improve left ankle AROM in order to assist with ambulating and general mobility. a. Inversion: 30 deg MET 40  b. Eversion: 10 deg MET 20  c. DF: 10 deg MET 14  d. PF: 55 deg MET 70  4. ? Strength: Pt will demonstrate left ankle eversion to greater than or equal to 3/5 muscle strength in order to demonstrate improvements in muscle activation and ankle motion to assist with mobility. MET  5. ? Function: Pt will demonstrate compliance with CAM boot in order to promote healing. questionable   6. Independent with Home Exercise Programs MET  7. Demonstrate Knowledge of fall prevention MET     LTG: (to be met in 12 treatments)  1. ? Pain:  Pt will report less than or equal to 1-2/10 left foot pain when walking, standing, and negotiating stairs without CAM boot and at night when sleeping in order to improve tolerance to activity. 2. ? Flexibility: Pt will demonstrate a decrease in gastroc tightness to less than or equal to 0 deg PF contracture in order to assist with ambulating and foot/ankle mechanics. 3.  ? AROM: Pt will improve left ankle AROM in order to assist with ambulating and general mobility. a. Inversion: 40 deg  b. Eversion: 20 deg  c. PF: 60 deg  4. ? Strength: Pt will demonstrate 5/5 left ankle/foot strength in order to assist with gait mechanics and overall mobility to progress to prior level of activity. 5. ? Gait: Pt will be able to ambulate without the CAM boot while demonstrating heel strike and push-off through the 1st met in order to promote an efficient gait pattern. 6. ? Function:Pt will score greater than or equal to 100% on the LEFI in order to demonstrate improved function without her CAM boot.                    Patient goals: \"improve mobility\"     Pt.  Education:  [] Yes  [x] No  [] Reviewed Prior HEP/Ed  Method of Education: [] Verbal  [] Demo  [] Written- 4-way ankle and back stretches  Comprehension of Education:  [] Verbalizes understanding. [] Demonstrates understanding. [] Needs review. [x] Demonstrates/verbalizes HEP/Ed previously given. Plan: [x] Continue current frequency toward long and short term goals.     [x] Specific Instructions for subsequent treatments: see above      Time In: 5:00pm          Time Out: 6:20pm    Electronically signed by:  Lizbeth Gipson PTA

## 2020-10-09 ENCOUNTER — OFFICE VISIT (OUTPATIENT)
Dept: ORTHOPEDIC SURGERY | Age: 35
End: 2020-10-09
Payer: COMMERCIAL

## 2020-10-09 VITALS — BODY MASS INDEX: 30.83 KG/M2 | RESPIRATION RATE: 12 BRPM | HEIGHT: 63 IN | TEMPERATURE: 98.2 F | WEIGHT: 174 LBS

## 2020-10-09 PROCEDURE — 99213 OFFICE O/P EST LOW 20 MIN: CPT | Performed by: ORTHOPAEDIC SURGERY

## 2020-10-09 NOTE — PROGRESS NOTES
[] [facility]) [facility] &  Therapy  [address]  [phone]  F: [phone] [] [address]  [employer] [address]   [address]  P: [phone]  F: [phone] [] [address] &  Therapy  [address] [facility] [address]  P: [phone]  F: [phone] [] [address]  P: [phone]  F: [phone] [] [address]  [facility]   [address]   [place]: [phone]  F: [phone]      Physical Therapy Progress Note    Date: 10/9/2020      Patient: Miah Munoz  : 1985  MRN: [id number]: ZQJacob Noah Latham: [facility]  Medical Diagnosis: M84.375D (ICD-10-CM) - Stress fracture of metatarsal bone of left foot with routine healing  Rehab Codes: M25.572, M25.475, M25.675, R26.2NEC, M62.572  Onset date: 2020                                   Next Dr's appt.: 10/9/2020  Visit# / total visits:; Cancels/No shows: 1/0  Date range of services: 2020 to 10/8/2020  Subjective:    Pain:  [x]? Yes  []? No   Location: left foot        Pain Rating: (0-10 scale) 2/10  Pain altered Tx:  [x]? No  []? Yes  Action:    Objective:  a. Inversion: 40 deg; 5/5  b. Eversion: 20 deg; 5/5  c. DF: 14 deg; 5/5  d. PF: 70 deg  e. Great toe ext:   f. Great toe flexion: 5/  g. Toe flex/ext:  ea    Assessment: Miah Munoz has completed 5 physical therapy visits with questionable compliance of CAM boot usage. Pt initially presented with inability to canelo without significant challenge and gastroc equinus was present. With the CAM boot, pt progressively experienced significant calf tenderness which has since improved. Pt continues to note a \"burning\" sensation along the medial calf with manual therapy. improvements in muscle activation for left foot eversion is noted and 5/5 MMT is present. Pt continues to benefit form skilled therapy in order to progress gait mechanics when pt is no longer required to wear the CAM boot. STG: (to be met in 6 treatments)  1. ? Pain:  Pt will report less than or equal to 2-3/10 left foot pain when walking and standing in the CAM boot and at night when sleeping in order to improve tolerance to activity.  MET 3/10   2. ? Flexibility: Pt will demonstrate a decrease in gastroc tightness to less than or equal to 5 deg PF contracture in order to assist with ambulating and foot/ankle mechanics. 3. ?  AROM: Pt will improve left ankle AROM in order to assist with ambulating and general mobility. a. Inversion: 30 deg MET 40  b. Eversion: 10 deg MET 20  c. DF: 10 deg MET 14  d. PF: 55 deg MET 70  4. ? Strength: Pt will demonstrate left ankle eversion to greater than or equal to 3/5 muscle strength in order to demonstrate improvements in muscle activation and ankle motion to assist with mobility.  MET  5. ? Function: Pt will demonstrate compliance with CAM boot in order to promote healing. questionable   6. Independent with Home Exercise Programs MET  7. Demonstrate Knowledge of fall prevention MET     LTG: (to be met in 12 treatments)  1. ? Pain:  Pt will report less than or equal to 1-2/10 left foot pain when walking, standing, and negotiating stairs without CAM boot and at night when sleeping in order to improve tolerance to activity.   2. ? Flexibility: Pt will demonstrate a decrease in gastroc tightness to less than or equal to 0 deg PF contracture in order to assist with ambulating and foot/ankle mechanics. 3.  ?  AROM: Pt will improve left ankle AROM in order to assist with ambulating and general mobility. a. Inversion: 40 deg  b.  Eversion: 20 deg  c. PF: 60 deg  4. ? Strength: Pt will demonstrate 5/5 left ankle/foot strength in order to assist with gait mechanics and overall mobility to progress to prior level of activity.   5. ? Gait: Pt will be able to ambulate without the CAM boot while demonstrating heel strike and push-off through the 1st met in order to promote an efficient gait pattern.   6. ? Function:Pt will score greater than or equal to 100% on the LEFI in order to demonstrate improved function without her CAM boot.                    Patient goals: \"improve mobility\"     Treatment Plan:  [x] Therapeutic Exercise   19237  [] Iontophoresis: 4 mg/mL Dexamethasone Sodium Phosphate  mAmin  53878   [] Therapeutic Activity  20403 [] Vasopneumatic cold with compression  64219    [] Gait Training   90642 [] Ultrasound   34731   [] Neuromuscular Re-education  80096 [] Electrical Stimulation Unattended  90599   [x] Manual Therapy  78871 [] Electrical Stimulation Attended  01853   [x] Instruction in HEP  [] Lumbar/Cervical Traction  51795   [] Aquatic Therapy   91382 [] Cold/hotpack    [] Massage   58730      [] Dry Needling, 1 or 2 muscles  16571   [] Biofeedback, first 15 minutes   21383  [] Biofeedback, additional 15 minutes   21217 [] Dry Needling, 3 or more muscles  42186       Patient Status:     [x] Continue per initial plan of care. [] Additional visits necessary. [] Other:    Electronically signed by Edy Pace PT on 10/9/2020 at 7:44 AM      If you have any questions or concerns, please don't hesitate to call. Thank you for your referral.    Physician Signature:________________________________Date:__________________  By signing above or cosigning this note, I have reviewed this plan of care and certify a need for medically necessary rehabilitation services.      *PLEASE SIGN ABOVE AND FAX BACK ALL PAGES*

## 2020-10-09 NOTE — PROGRESS NOTES
Neurological: Negative for headaches. Hematological: Does not bruise/bleed easily. Musculoskeletal: See HPI for pertinent positives     Past Medical History:    She  has no past medical history on file. Past Surgical History:    She  has a past surgical history that includes Ectopic pregnancy surgery; Dental surgery;  section; [place] tooth extraction;  section (N/A, 186); and Umbilical hernia repair (N/A, 2019). Current Medications:     Current Outpatient Medications:     amoxicillin (AMOXIL) 875 MG tablet, Take 1 tablet by mouth 2 times daily for 10 days, Disp: 20 tablet, Rfl: 0    ondansetron (ZOFRAN-ODT) 4 MG disintegrating tablet, Take 1 tablet by mouth 3 times daily as needed for Nausea or Vomiting, Disp: 21 tablet, Rfl: 0    aspirin 325 MG EC tablet, Take 1 tablet by mouth daily (Patient not taking: Reported on 9/15/2020), Disp: 42 tablet, Rfl: 0    buPROPion HCl (WELLBUTRIN PO), Take by mouth, Disp: , Rfl:     ibuprofen (ADVIL;MOTRIN) 800 MG tablet, Take 1 tablet by mouth 3 times daily as needed for Pain, Disp: 20 tablet, Rfl: 0     Allergies:    Tape [adhesive tape]    Family History:  family history includes Mental Illness in her mother; Rheum Arthritis in her father; Seizures in her brother; Stroke in her brother.     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 Skin:  Intact over foot/ankle, without rash/lesions/AV malformations. Motion: Able to wiggle toes   -Range of motion of foot/ankle grossly normal  -No tenderness at knee or proximal leg   -Tenderness to palpation: Mild at the second metatarsal      RADIOLOGY:   10/9/2020 FINDINGS:  Three weightbearing views (AP, Oblique, and Lateral) of the left foot were obtained in the office today and reviewed, revealing no acute dislocation, or radioopaque foreign body/tumor. Overall alignment is satisfactory. IMPRESSION:  No acute fracture/dislocation. Electronically signed by Meghan Ward MD        MRI images and radiology report reviewed, as below:    1. Findings above likely represent stress related marrow edema or    nondisplaced proximal 2nd metatarsal stress fracture.  Mild periosteal edema    surrounding the 2nd metatarsal.    2. Mild degenerative changes as above. 3. Mild edema in the soft tissues at the dorsum of the foot.                 FINDINGS:  Three weightbearing views (AP, Mortise, and Lateral) of the left ankle and three weightbearing views (AP, Oblique, Lateral) of the left foot were obtained in the office today and reviewed, revealing no acute dislocation, or radioopaque foreign body/tumor. The ankle mortise is maintained with no widening of the clear spaces. Overall alignment is satisfactory. Cortical irregularity at the second distal metatarsal shaft, possibly representing a fracture. IMPRESSION: Possible second metatarsal fracture as above. Electronically signed by Meghan Ward MD        Xr Foot Left (min 3 Views)  Result Date: 8/31/2020  1.  No acute radiographic finding to account for patient's left foot pain. 2. Mild 1st MTP degenerative changes. ASSESSMENT AND PLAN:  Reta Olguin was seen today for Foot Pain (left foot)  The encounter diagnosis was Stress fracture of metatarsal bone of left foot with routine healing, subsequent encounter. Body mass index is 30.82 kg/m². She has a left foot second metatarsal insufficiency fracture, sustained on 8/24/2020, doing well with conservative management. Notably, she has no relevant past medical history. I had another discussion today with the patient about the likely diagnosis and its natural history, physical exam and imaging findings, as well as treatment options in detail. We discussed rest/activity modification, swelling control, NSAIDs/Acetaminophen/topical anesthetics, orthotics/shoewear modification, bracing/immobilization, injections, and physical therapy. The patient wishes to proceed with the recommendations as above. Orders/referrals were placed as below at today's visit. I renewed her sessions with physical therapy, to continue to work on general strengthening, and she will wean out of the boot over the next 2-4 weeks as tolerated, avoiding painful activity in the process. She may stop using aspirin when she is out of the boot. We have discussed the risks of displacement. All questions were answered and the patient agrees with the above plan. The patient will return to clinic in 8 weeks with repeat foot x-rays. At her next visit, I anticipate progressing her activity further. Return in about 8 weeks (around 12/4/2020). No orders of the defined types were placed in this encounter.     Orders Placed This Encounter   Procedures    XR FOOT LEFT (MIN 3 VIEWS)     WEIGHTBEARING 3 views: AP, Oblique, Lateral     Standing Status:   Future     Number of Occurrences:   1     Standing Expiration Date:   11/9/2020     Order Specific Question:   Reason for exam:     Answer:   eval alignment   

## 2020-10-13 ENCOUNTER — HOSPITAL ENCOUNTER (OUTPATIENT)
Dept: PHYSICAL THERAPY | Facility: CLINIC | Age: 35
Setting detail: THERAPIES SERIES
Discharge: HOME | End: 2020-10-13
Payer: COMMERCIAL

## 2020-10-13 PROCEDURE — 97110 THERAPEUTIC EXERCISES: CPT

## 2020-10-13 NOTE — FLOWSHEET NOTE
her kids. Limited treatment d/t time restriction. Bolded exercises completed  Exercise       Reps/ Time Weight/ Level Comments   nustep  6min   Boot off 10/13/20         Manual therapy 14'   MFR to peroneal tendons, and gastroc (medial more tender, lateral increased tightness)   Calf stretch 5x30\" strap Use of towel for support of the foot      Ankle pumps 10x3       eversion 10x A            Alphabet  1 cycle     Ankle circles 1x      4-way ankle 10x2 ea lime    BAPS- seated 15x ea lv2 A/P, M/L, CW/CCW- increased reps 10/13/20   Toe yoga 10x ea   reviewed   domers 10x             Planks from knees  3x15\"     Side planks from knees  3x15\"ea  Cuing for technique    Gait   1 lap    boot on, good technique    Education x   Signs of a blood clot   Assessment 9/25  Palpation in the calf: more tender on the medial aspect of the calf vs lateral (both sides tender)  - decreased tenderness is noted with calf stretch  - pedal pulse is palpated  - no temperature changes or warmth  Observation: no color changes or observed swelling in calf/LLE  Movement: greater medial calf pain with straight leg DF  - less calf pain with left knee joint in flexion    Specific Instructions for next treatment: review HEP and compliance with CAM boot; stretching of the calf (gastroc + Achilles), ROM, light strengthening, and gait training. Modalities PRN. Please teach home protocol. Add in [employer] 82 core strengthening     Treatment Charges: Mins Units   []  Modalities CP     [x]  Ther Exercise 31 2   []  Manual Therapy     []  Ther Activities     []  Aquatics     []  Vasocompression     []  Other     Total Treatment time 31 2       Assessment: [x] Progressing toward goals. Pt reports continued discomfort during eversion in her ankle and calf pain with most exercises, but especially after calf stretching. Pt states the pain is different than how it feels in her R LE if she stretches.  No progressions made to treatment this date d/t limited time improve left ankle AROM in order to assist with ambulating and general mobility. a. Inversion: 40 deg  b. Eversion: 20 deg  c. PF: 60 deg  4. ? Strength: Pt will demonstrate 5/5 left ankle/foot strength in order to assist with gait mechanics and overall mobility to progress to prior level of activity. 5. ? Gait: Pt will be able to ambulate without the CAM boot while demonstrating heel strike and push-off through the 1st met in order to promote an efficient gait pattern. 6. ? Function:Pt will score greater than or equal to 100% on the LEFI in order to demonstrate improved function without her CAM boot.                    Patient goals: \"improve mobility\"     Pt. Education:  [] Yes  [x] No  [] Reviewed Prior HEP/Ed  Method of Education: [] Verbal  [] Demo  [] Written- 4-way ankle and back stretches  Comprehension of Education:  [] Verbalizes understanding. [] Demonstrates understanding. [] Needs review. [x] Demonstrates/verbalizes HEP/Ed previously given. Plan: [x] Continue current frequency toward long and short term goals.     [x] Specific Instructions for subsequent treatments: see above      Time In: 5:53pm          Time Out: 6:30pm    Electronically signed by:  Lennox Evans PTA

## 2020-10-16 ENCOUNTER — HOSPITAL ENCOUNTER (OUTPATIENT)
Dept: PHYSICAL THERAPY | Facility: CLINIC | Age: 35
Setting detail: THERAPIES SERIES
Discharge: HOME | End: 2020-10-16
Payer: COMMERCIAL

## 2020-10-16 PROCEDURE — 97016 VASOPNEUMATIC DEVICE THERAPY: CPT

## 2020-10-16 PROCEDURE — 97110 THERAPEUTIC EXERCISES: CPT

## 2020-10-16 NOTE — FLOWSHEET NOTE
[] [phone]  - [facility]  [facility]  [address]  [phone]  F: [phone] [x] [address]  Kl\[facility] [place] [facility]\"" [address]   [address]  P: [phone]  F: [phone] [] [address]  Therapy  [address] [facility]  P: [phone]  F: [phone] [] [address]  [facility]   [address]   [place]: [phone]  F: [phone]      Physical Therapy Daily Treatment Note    Date:  10/16/2020  Patient Name:  Ivone Aden    :  1985  MRN: [id number]  Physician: Dr. Elam Gal: MMO  Medical Diagnosis: M84.375D (ICD-10-CM) - Stress fracture of metatarsal bone of left foot with routine healing  Rehab Codes: M25.572, M25.475, M25.675, R26.2NEC, M62.572  Onset date: 2020                                   Next 's appt.: 10/9/2020  Visit# / total visits:     Cancels/No Shows: 1/0    Subjective:    Pain:  [] Yes  [x] No Location: left foot Pain Rating: (0-10 scale) 0/10  Pain altered Tx:  [x] No  [] Yes  Action:    Comments: Pt arrives wearing her tennis shoes and denies any pain this date. Pt mentions she just has soreness in L foot at night.      Objective:  Modalities: HP to left calf prior to manual therapy 10'  GAMEREADY to left foot/ankle, mod compression; 10'; CP 10' to left calf  Precautions: allergy: adhesive tape; WBAT in CAM boot  Exercises: Bolded exercises completed  Exercise       Reps/ Time Weight/ Level Comments   nustep  6min   Boot off 10/13/20         Manual therapy 14'   MFR to peroneal tendons, and gastroc (medial more tender, lateral increased tightness)   Calf stretch 5x30\" strap Use of towel for support of the foot      Ankle pumps 10x3       eversion 10x A            Alphabet  1 cycle     Ankle circles 1x      4-way ankle 10x2 ea blue BAPS- seated 15x ea lv2 A/P, M/L, CW/CCW- increased reps 10/13/20   Toe yoga 10x ea   reviewed   domers 10x             Planks from knees  3x20\"     Side planks from knees  3x20\"ea  Cuing for technique    Step ups 10xea 4\" Fwd/lat   Heel raises 10x     Slant board stretch 3x20\"     Gait   2 laps    boot off, good technique    Education x   Signs of a blood clot   Assessment 9/25  Palpation in the calf: more tender on the medial aspect of the calf vs lateral (both sides tender)  - decreased tenderness is noted with calf stretch  - pedal pulse is palpated  - no temperature changes or warmth  Observation: no color changes or observed swelling in calf/LLE  Movement: greater medial calf pain with straight leg DF  - less calf pain with left knee joint in flexion    Specific Instructions for next treatment: review HEP and compliance with CAM boot; stretching of the calf (gastroc + Achilles), ROM, light strengthening, and gait training. Modalities PRN. Please teach home protocol. Add in [employer] 82 core strengthening     Treatment Charges: Mins Units   [x]  Modalities CP 10 NC   [x]  Ther Exercise 43 3   []  Manual Therapy     []  Ther Activities     []  Aquatics     [x]  Vasocompression 10 1   []  Other     Total Treatment time 53 4       Assessment: [x] Progressing toward goals. Progressed resistance for 4 way ankle to blue t band, addition of step ups on 4\" step, slant board stretch, and heel raises this date. Increased hold time for planks, pt with good tolerance to all progressions. Focus on heel strike and push off with gait, pt demonstrates good mechanics, however, excessive eversion present. No c/o pain throughout session. Ended with vaso to L ankle and CP to calf for sx control. [] No change. [] Other:    Problems:    [x]? ? Pain: 0-4/10  [x]? ? ROM: dec AROM  [x]? ? Strength: average: 3-/5 left foot/ankle; 2-/5 evertor  [x]? ?  Function: LEFI: 92.5% - in CAM boot  [x]? ? Balance: dec flatfoot WBing on LLE  [x]? Edema: edema on dorsum of left foot  [x]?  Gait Deviations: ambulating in CAM boot                              STG: (to be met in 6 treatments)  1. ? Pain:  Pt will report less than or equal to 2-3/10 left foot pain when walking and standing in the CAM boot and at night when sleeping in order to improve tolerance to activity. MET 3/10   2. ? Flexibility: Pt will demonstrate a decrease in gastroc tightness to less than or equal to 5 deg PF contracture in order to assist with ambulating and foot/ankle mechanics. 3. ? AROM: Pt will improve left ankle AROM in order to assist with ambulating and general mobility. a. Inversion: 30 deg MET 40  b. Eversion: 10 deg MET 20  c. DF: 10 deg MET 14  d. PF: 55 deg MET 70  4. ? Strength: Pt will demonstrate left ankle eversion to greater than or equal to 3/5 muscle strength in order to demonstrate improvements in muscle activation and ankle motion to assist with mobility. MET  5. ? Function: Pt will demonstrate compliance with CAM boot in order to promote healing. questionable   6. Independent with Home Exercise Programs MET  7. Demonstrate Knowledge of fall prevention MET     LTG: (to be met in 12 treatments)  1. ? Pain:  Pt will report less than or equal to 1-2/10 left foot pain when walking, standing, and negotiating stairs without CAM boot and at night when sleeping in order to improve tolerance to activity. 2. ? Flexibility: Pt will demonstrate a decrease in gastroc tightness to less than or equal to 0 deg PF contracture in order to assist with ambulating and foot/ankle mechanics. 3.  ? AROM: Pt will improve left ankle AROM in order to assist with ambulating and general mobility. a. Inversion: 40 deg  b. Eversion: 20 deg  c. PF: 60 deg  4. ? Strength: Pt will demonstrate 5/5 left ankle/foot strength in order to assist with gait mechanics and overall mobility to progress to prior level of activity.    5. ? Gait: Pt will be able to ambulate without the CAM boot while demonstrating heel strike and push-off through the 1st met in order to promote an efficient gait pattern. 6. ? Function:Pt will score greater than or equal to 100% on the LEFI in order to demonstrate improved function without her CAM boot.                    Patient goals: \"improve mobility\"     Pt. Education:  [x] Yes  [] No  [x] Reviewed Prior HEP/Ed -Instructed on use of CAM boot when amb longer distances to promote healing   Method of Education: [x] Verbal  [] Demo  [] Written  Comprehension of Education:  [x] Verbalizes understanding. [x] Demonstrates understanding. [] Needs review. [x] Demonstrates/verbalizes HEP/Ed previously given. Plan: [x] Continue current frequency toward long and short term goals.     [x] Specific Instructions for subsequent treatments: see above      Time In: 8:32 am          Time Out: 9:31 am    Electronically signed by:  Zacarias Hooker PTA

## 2020-10-19 ENCOUNTER — HOSPITAL ENCOUNTER (OUTPATIENT)
Dept: PHYSICAL THERAPY | Facility: CLINIC | Age: 35
Setting detail: THERAPIES SERIES
Discharge: HOME | End: 2020-10-19
Payer: COMMERCIAL

## 2020-10-19 NOTE — FLOWSHEET NOTE
[] [address]  [address]    [phone]  F: [phone]   [x] [address]  [facility] [address]   [address]  P: [phone]  F: [phone]  [] [place]  [address] [facility]  P: [phone]  F: [phone]  [] [address]  [facility]   [place]: [phone]  F: [phone]   [] [place] [id number] [facility] [address]  [place]: [phone]   F: [phone]     Physical Therapy Cancel/No Show note    Date: 10/19/2020  Patient: Mac Short  : 1985  MRN: [id number]    Cancels/No Shows to date: 0    For today's appointment patient:    [x]  Cancelled    [] Rescheduled appointment    [] No-show     Reason given by patient:    [x]  Patient ill -- fever    []  Conflicting appointment    [] No transportation      [] Conflict with work    [] No reason given    [] Weather related    [] [phone]    [] Other:      Comments:        [x] Next appointment was confirmed    Electronically signed by: Aure Perdue PTA

## 2020-10-22 ENCOUNTER — HOSPITAL ENCOUNTER (OUTPATIENT)
Dept: PHYSICAL THERAPY | Facility: CLINIC | Age: 35
Setting detail: THERAPIES SERIES
Discharge: HOME | End: 2020-10-22
Payer: COMMERCIAL

## 2020-10-22 NOTE — FLOWSHEET NOTE
[] [address]  [address]    [phone]  F: [phone]   [x] [address] [facility] [address]   [address]  P: [phone]  F: [phone]  [] [address]  [address] [facility]  P: [phone]  F: [phone]  [] [address]  [facility]   [address]   [place]: [phone]  F: [phone]   [] [place] [address] [facility] [address]  [place]: [phone]   F: [phone]     Physical Therapy Cancel/No Show note    Date: 10/22/2020  Patient: Ariela Ervin  : 1985  MRN: [id number]    Cancels/No Shows to date: 3/0    For today's appointment patient:    [x]  Cancelled    [] Rescheduled appointment    [] No-show     Reason given by patient:    [x]  Patient ill -- fever still. []  Conflicting appointment    [] No transportation      [] Conflict with work    [] No reason given    [] Weather related    [] [id number]    [x] Other:      Comments: Will call back to reschedule.      [] Next appointment was confirmed    Electronically signed by: Forest Quick PTA

## 2021-01-05 ENCOUNTER — PATIENT MESSAGE (OUTPATIENT)
Dept: FAMILY MEDICINE CLINIC | Age: 36
End: 2021-01-05

## 2021-01-05 NOTE — TELEPHONE ENCOUNTER
From: Sofia Holter  To: MARINA Rowell CNP  Sent: 1/5/2021 2:34 PM EST  Subject: Visit Follow-Up Question    Hello,   Yes anytime tomorrow is great! I have taken tylenol, used ice packs, a heating pad. I dont know what else to do. Also, I have a cold sore if that means anything! Lol  Thank you       ----- Message -----   MARINA Philip CNP   Sent:1/5/2021 2:26 PM EST   To:Amparo Moody   Subject:RE: Visit Follow-Up Question    Hello I do have an two appts tomorrow open in afternoon or a few also on Thursday, do either day work for you??  navi      ----- Message -----   From:Amparo Ruelas   Sent:1/5/2021 1:45 PM EST   To:MARINA Bill CNP   Subject:Visit Follow-Up Question    Hello,  I just called to make an apt and I cannot get in until fri. I was just wondering if you had any advice I guess. I was just at the dentist recently, so I'm thinking its not actually my teeth. I have a lump under my chin that is hard and stays in place. The front of my bottom jaw hurts, right below my cheek bone hurts and I cannot get rid of this headache. Does all this fun stuff add up to make any sense to you? If its nothing concerning id be happy to call back and schedule that apt for Fri. Otherwise I can go to the walk in place when my hubby gets home. Today is day 2 of a headache I just can't kick.   Thank you,  [facility]

## 2021-01-06 ENCOUNTER — OFFICE VISIT (OUTPATIENT)
Dept: PRIMARY CARE CLINIC | Age: 36
End: 2021-01-06
Payer: COMMERCIAL

## 2021-01-06 ENCOUNTER — HOSPITAL ENCOUNTER (OUTPATIENT)
Age: 36
Setting detail: SPECIMEN
Discharge: HOME | End: 2021-01-06
Payer: COMMERCIAL

## 2021-01-06 VITALS
OXYGEN SATURATION: 98 % | WEIGHT: 166 LBS | BODY MASS INDEX: 29.41 KG/M2 | HEIGHT: 63 IN | TEMPERATURE: 97.7 F | HEART RATE: 79 BPM

## 2021-01-06 DIAGNOSIS — R20.0 NUMBNESS OF RIGHT FOOT: ICD-10-CM

## 2021-01-06 DIAGNOSIS — J01.40 ACUTE NON-RECURRENT PANSINUSITIS: Primary | ICD-10-CM

## 2021-01-06 PROCEDURE — 99212 OFFICE O/P EST SF 10 MIN: CPT | Performed by: NURSE PRACTITIONER

## 2021-01-06 RX ORDER — FLUCONAZOLE 150 MG/1
150 TABLET ORAL ONCE
Qty: 1 TABLET | Refills: 0 | Status: SHIPPED | OUTPATIENT
Start: 2021-01-06 | End: 2021-01-06

## 2021-01-06 RX ORDER — AMOXICILLIN AND CLAVULANATE POTASSIUM 875; 125 MG/1; MG/1
1 TABLET, FILM COATED ORAL 2 TIMES DAILY
Qty: 20 TABLET | Refills: 0 | Status: SHIPPED | OUTPATIENT
Start: 2021-01-06 | End: 2021-01-16

## 2021-01-06 ASSESSMENT — ENCOUNTER SYMPTOMS
COUGH: 0
SINUS PAIN: 0
VOMITING: 0
DIARRHEA: 0
SHORTNESS OF BREATH: 0
RHINORRHEA: 1
SORE THROAT: 1
SINUS PRESSURE: 1

## 2021-01-06 ASSESSMENT — PATIENT HEALTH QUESTIONNAIRE - PHQ9
SUM OF ALL RESPONSES TO PHQ QUESTIONS 1-9: 0
SUM OF ALL RESPONSES TO PHQ QUESTIONS 1-9: 0
1. LITTLE INTEREST OR PLEASURE IN DOING THINGS: 0

## 2021-01-06 NOTE — PATIENT INSTRUCTIONS
Patient Education        Sinusitis: Care Instructions  Your Care Instructions     Sinusitis is an infection of the lining of the sinus cavities in your head. Sinusitis often follows a cold. It causes pain and pressure in your head and face. In most cases, sinusitis gets better on its own in 1 to 2 weeks. But some mild symptoms may last for several weeks. Sometimes antibiotics are needed. Follow-up care is a key part of your treatment and safety. Be sure to make and go to all appointments, and call your doctor if you are having problems. It's also a good idea to know your test results and keep a list of the medicines you take. How can you care for yourself at home? · Take an over-the-counter pain medicine, such as acetaminophen (Tylenol), ibuprofen (Advil, Motrin), or naproxen (Aleve). Read and follow all instructions on the label. · If the doctor prescribed antibiotics, take them as directed. Do not stop taking them just because you feel better. You need to take the full course of antibiotics. · Be careful when taking over-the-counter cold or flu medicines and Tylenol at the same time. Many of these medicines have acetaminophen, which is Tylenol. Read the labels to make sure that you are not taking more than the recommended dose. Too much acetaminophen (Tylenol) can be harmful. · Breathe warm, moist air from a steamy shower, a hot bath, or a sink filled with hot water. Avoid cold, dry air. Using a humidifier in your home may help. Follow the directions for cleaning the machine. · Use saline (saltwater) nasal washes to help keep your nasal passages open and wash out mucus and bacteria. You can buy saline nose drops at a grocery store or drugstore. Or you can make your own at home by adding 1 teaspoon of salt and 1 teaspoon of baking soda to 2 cups of distilled water. If you make your own, fill a bulb syringe with the solution, insert the tip into your nostril, and squeeze gently. [place] your nose. · Put a hot, wet towel or a warm gel pack on your face 3 or 4 times a day for 5 to 10 minutes each time. · Try a decongestant nasal spray like oxymetazoline (Afrin). Do not use it for more than 3 days in a row. Using it for more than 3 days can make your congestion worse. When should you call for help? Call your doctor now or seek immediate medical care if:    · You have new or worse swelling or redness in your face or around your eyes.     · You have a new or higher fever. Watch closely for changes in your health, and be sure to contact your doctor if:    · You have new or worse facial pain.     · The mucus from your nose becomes thicker (like pus) or has new blood in it.     · You are not getting better as expected. Where can you learn more? Go to [email or web address] and sign in to your [employer] account. Enter P986 in the AppliLog box to learn more about \"Sinusitis: Care Instructions. \"     If you do not have an account, please click on the \"Sign Up Now\" link. Current as of: April 15, 2020               Content Version: 12.6  © 3386-8825 [employer]. Care instructions adapted under license by [facility] ([facility]). If you have questions about a medical condition or this instruction, always ask your healthcare professional. [address] any warranty or liability for your use of this information.

## 2021-01-06 NOTE — PROGRESS NOTES
[facility] [address] [facility] [place] [facility]  [address] [place] [address]  Dept: [phone]  Dept Fax: [phone]    Nate Diaz is a 30 y.o. female who presents to the urgent care today for her medicalconditions/complaints as noted below. Nate Diaz is c/o of Headache (pt is having a headache x4days, runny nose, congestion, facial pressure, lump on chin, hot and cold chills )      HPI:     30 y.o female presents with c/o sinus pressure. Pt reprots maxillary and frontal pressure. Patient reports symptoms been ongoing for several days. Patient reports nasal congestion, rhinorrhea, postnasal drainage. Reports these headaches are abnormal for her. Patient reports mild dry cough. Denies chest pain or shortness of breath. Denies vomiting diarrhea. Treatments tried include none. Patient patient reports a tingling sensation to the toes of the right foot. Denies any new injury or trauma. Patient is most concerned about a possible history of rheumatoid arthritis as she has a family history of this. Patient becomes emotional when discussing these conditions. No past medical history on file. Current Outpatient Medications   Medication Sig Dispense Refill    amoxicillin-clavulanate (AUGMENTIN) 875-125 MG per tablet Take 1 tablet by mouth 2 times daily for 10 days 20 tablet 0    fluconazole (DIFLUCAN) 150 MG tablet Take 1 tablet by mouth once for 1 dose 1 tablet 0    buPROPion HCl (WELLBUTRIN PO) Take by mouth      aspirin 325 MG EC tablet Take 1 tablet by mouth daily (Patient not taking: Reported on 9/15/2020) 42 tablet 0    ibuprofen (ADVIL;MOTRIN) 800 MG tablet Take 1 tablet by mouth 3 times daily as needed for Pain 20 tablet 0     No current facility-administered medications for this visit.       Allergies   Allergen Reactions    Tape Ronit Saras Tape] Swelling     Surgical tape       Subjective:      Review of Systems Constitutional: Positive for chills, fatigue and fever. HENT: Positive for congestion, rhinorrhea, sinus pressure and sore throat. Negative for ear pain and sinus pain. Respiratory: Negative for cough and shortness of breath. Cardiovascular: Negative for chest pain. Gastrointestinal: Negative for diarrhea and vomiting. Musculoskeletal: Positive for myalgias. All other systems reviewed and are negative. Objective:     Physical Exam  Vitals signs and nursing note reviewed. Constitutional:       General: She is not in acute distress. Appearance: Normal appearance. She is not toxic-appearing. HENT:      Right Ear: Tympanic membrane normal.      Nose: Congestion and rhinorrhea present. Right Sinus: Maxillary sinus tenderness and frontal sinus tenderness present. Left Sinus: Maxillary sinus tenderness and frontal sinus tenderness present. Mouth/Throat:      Pharynx: No posterior oropharyngeal erythema. Cardiovascular:      Rate and Rhythm: Normal rate. Pulmonary:      Effort: Pulmonary effort is normal. No respiratory distress. Breath sounds: Normal breath sounds. Lymphadenopathy:      Cervical: Cervical adenopathy present. Skin:     General: Skin is warm and dry. Neurological:      General: No focal deficit present. Mental Status: She is alert and oriented to person, place, and time. Pulse 79   Temp 97.7 °F (36.5 °C) (Temporal)   Ht 5' 3\" (1.6 m)   Wt 166 lb (75.3 kg)   LMP 01/05/2021 (Approximate)   SpO2 98%   BMI 29.41 kg/m²   Lab Review   No visits with results within 2 Month(s) from this visit. Latest known visit with results is:   Hospital Outpatient Visit on 07/25/2020   Component Date Value    SARS-CoV-2, NANCY 07/25/2020 Not Detected        Assessment:       Diagnosis Orders   1.  Acute non-recurrent pansinusitis  COVID-19 Ambulatory    amoxicillin-clavulanate (AUGMENTIN) 875-125 MG per tablet    fluconazole (DIFLUCAN) 150 MG tablet 2. Numbness of right foot  Rheumatoid Factor    [place] Screen With Reflex       Plan:      Return if symptoms worsen or fail to improve. Orders Placed This Encounter   Medications    amoxicillin-clavulanate (AUGMENTIN) 875-125 MG per tablet     Sig: Take 1 tablet by mouth 2 times daily for 10 days     Dispense:  20 tablet     Refill:  0    fluconazole (DIFLUCAN) 150 MG tablet     Sig: Take 1 tablet by mouth once for 1 dose     Dispense:  1 tablet     Refill:  0           Based on the duration and severity of the symptoms-- I will treat this as bacterial at this time. Patient instructed to complete antibiotic prescription fully. Diflucan given for possible antibiotic side effect. Covid test ordered. May use Motrin/Tylenol for fever/pain. Saline washes, salt water gargles and over the counter preparations if desired. Patient agreeable to treatment plan. Educational materials provided on AVS.  Follow up if symptoms do not improve/worsen. Labs for autoimmune conditions ordered    Patient given educational materials - see patient instructions. Discussed use, benefit, and side effects of prescribed medications. All patientquestions answered. Pt voiced understanding. This note was transcribed using dictation with Dragon services. Efforts were made to correct any errors but some words may be misinterpreted.      Electronically signed by MARINA Hubbard CNP on 1/6/2021at 4:58 PM

## 2021-01-07 ENCOUNTER — HOSPITAL ENCOUNTER (OUTPATIENT)
Age: 36
Discharge: HOME | End: 2021-01-07
Payer: COMMERCIAL

## 2021-01-07 DIAGNOSIS — J01.40 ACUTE NON-RECURRENT PANSINUSITIS: ICD-10-CM

## 2021-01-07 DIAGNOSIS — R20.0 NUMBNESS OF RIGHT FOOT: ICD-10-CM

## 2021-01-07 LAB — RHEUMATOID FACTOR: <10 IU/ML

## 2021-01-07 PROCEDURE — 86038 ANTINUCLEAR ANTIBODIES: CPT

## 2021-01-07 PROCEDURE — 36415 COLL VENOUS BLD VENIPUNCTURE: CPT

## 2021-01-07 PROCEDURE — 86431 RHEUMATOID FACTOR QUANT: CPT

## 2021-01-08 LAB — ANTI-NUCLEAR ANTIBODY (ANA): NEGATIVE

## 2021-01-09 LAB — SARS-COV-2, NAA: NOT DETECTED

## 2021-01-21 ENCOUNTER — NURSE TRIAGE (OUTPATIENT)
Dept: OTHER | Facility: CLINIC | Age: 36
End: 2021-01-21

## 2021-01-21 NOTE — TELEPHONE ENCOUNTER
Reason for Disposition   SEVERE diarrhea (e.g., 7 or more times / day more than normal) and present > 24 hours (1 day)    Answer Assessment - Initial Assessment Questions  1. DIARRHEA SEVERITY: \"How bad is the diarrhea? \" \"How many extra stools have you had in the past 24 hours than normal?\"     - NO DIARRHEA (SCALE 0)    - MILD (SCALE 1-3): Few loose or mushy BMs; increase of 1-3 stools over normal daily number of stools; mild increase in ostomy output. -  MODERATE (SCALE 4-7): Increase of 4-6 stools daily over normal; moderate increase in ostomy output. * SEVERE (SCALE 8-10; OR 'WORST POSSIBLE'): Increase of 7 or more stools daily over normal; moderate increase in ostomy output; incontinence. In past 24 hours,  10-13 times    2. ONSET: \"When did the diarrhea begin? \"       5 days ago - happens all day    3. BM CONSISTENCY: \"How loose or watery is the diarrhea? \"       \"Extremely soft \"    4. VOMITING: \"Are you also vomiting? \" If so, ask: \"How many times in the past 24 hours? \"       Nausea not vomiting     5. ABDOMINAL PAIN: Clabe Sue you having any abdominal pain? \" If yes: \"What does it feel like? \" (e.g., crampy, dull, intermittent, constant)       Feels like stomach is \"in knots\"    6. ABDOMINAL PAIN SEVERITY: If present, ask: \"How bad is the pain? \"  (e.g., Scale 1-10; mild, moderate, or severe)    - MILD (1-3): doesn't interfere with normal activities, abdomen soft and not tender to touch     - MODERATE (4-7): interferes with normal activities or awakens from sleep, tender to touch     - SEVERE (8-10): excruciating pain, doubled over, unable to do any normal activities        Stomach is in knots, rates 2/10    7. ORAL INTAKE: If vomiting, \"Have you been able to drink liquids? \" \"How much fluids have you had in the past 24 hours? \"      She has been drinking fluids . Had 5 or 6 20 oz cups    8. HYDRATION: \"Any signs of dehydration? \" (e.g., dry mouth [not just dry lips], too weak to stand, dizziness, new weight loss) \"When did you last urinate? \"      She is urinating more often but in lesser amounts than usual . Denies s/s dehydration    9. EXPOSURE: \"Have you traveled to a foreign country recently? \" \"Have you been exposed to anyone with diarrhea? \" \"Could you have eaten any food that was spoiled? \"      No    10. ANTIBIOTIC USE: \"Are you taking antibiotics now or have you taken antibiotics in the past 2 months? \"        When last in office about 2 weeks ago , she was put on antibiotics . 11. OTHER SYMPTOMS: \"Do you have any other symptoms? \" (e.g., fever, blood in stool)        Denies    12. PREGNANCY: \"Is there any chance you are pregnant? \" \"When was your last menstrual period? \"        no    Protocols used: DIARRHEA-ADULT-OH    Patient called pre-service center [facility] Damaso JoynerNidia) with red flag complaint. Brief description of triage: see above    Triage indicates for patient to be seen today    Care advice provided, patient verbalizes understanding; denies any other questions or concerns; instructed to call back for any new or worsening symptoms. Writer provided warm transfer to Angelique at [facility] for appointment scheduling. Attention Provider: Thank you for allowing me to participate in the care of your patient. The patient was connected to triage in response to information provided to the [facility]. Please do not respond through this encounter as the response is not directed to a shared pool.

## 2021-01-22 ENCOUNTER — HOSPITAL ENCOUNTER (OUTPATIENT)
Age: 36
Setting detail: SPECIMEN
Discharge: HOME | End: 2021-01-22
Payer: COMMERCIAL

## 2021-01-22 ENCOUNTER — VIRTUAL VISIT (OUTPATIENT)
Dept: FAMILY MEDICINE CLINIC | Age: 36
End: 2021-01-22
Payer: COMMERCIAL

## 2021-01-22 ENCOUNTER — PATIENT MESSAGE (OUTPATIENT)
Dept: FAMILY MEDICINE CLINIC | Age: 36
End: 2021-01-22

## 2021-01-22 DIAGNOSIS — R19.7 DIARRHEA, UNSPECIFIED TYPE: Primary | ICD-10-CM

## 2021-01-22 DIAGNOSIS — R19.7 DIARRHEA, UNSPECIFIED TYPE: ICD-10-CM

## 2021-01-22 LAB — LACTOFERRIN, QUAL: ABNORMAL

## 2021-01-22 PROCEDURE — 99442 PR PHYS/QHP TELEPHONE EVALUATION 11-20 MIN: CPT | Performed by: NURSE PRACTITIONER

## 2021-01-22 NOTE — PROGRESS NOTES
Cassie Arceo is a 30 y.o. female evaluated via telephone on 1/22/2021. Consent:  She and/or health care decision maker is aware that that she may receive a bill for this telephone service, depending on her insurance coverage, and has provided verbal consent to proceed: Yes      Documentation:  I communicated with the patient and/or health care decision maker about: pt. Calling in today for evaluation of new onset diarrhea 5 days ago. She does not recall any triggers. She denies blood or mucous in stool. Denies black stool. She started activated charcoal a few days ago and it has decreased in amt and times she is going. The consistency is different. Has mild abd. Cramping and pains. Denies fever, but did have some chills but may be r/t to her menstrual period. Has had some int. Nausea and vomited once. She tried yogurt and this stopped the vomiting. No one else in the home is ill. Has had int. Headaches but nothing new than her normal. Denies other covid-19 symptoms. Did stop wellbutrin a few weeks ago. She was on antibiotics (augmentin) recently and just finished it on 1-18-21. She went 5 times yesterday which is less than what is was before that. Details of this discussion including any medical advice provided: pt. Advised of risk for c diff with recent abx use and advised I will order stool cultures to get done asap. She is going out of town today. But I will monitor results  this over weekend and will call in meds when results back. Advised to push fluids, rest and high fiber diet also for now. Go to ER if any weakness, or dehydration. Pt. Agrees to plan and will get stool cultures done today. I affirm this is a Patient Initiated Episode with a Patient who has not had a related appointment within my department in the past 7 days or scheduled within the next 24 hours.     Patient identification was verified at the start of the visit: Yes    Total Time: minutes: 11-20 minutes Note: not billable if this call serves to triage the patient into an appointment for the relevant concern      Rashawn Min

## 2021-01-22 NOTE — PROGRESS NOTES
Visit Information    Have you changed or started any medications since your last visit including any over-the-counter medicines, vitamins, or herbal medicines? no   Have you stopped taking any of your medications? Is so, why? -  no  Are you having any side effects from any of your medications? - no    Have you seen any other physician or provider since your last visit?  no   Have you had any other diagnostic tests since your last visit?  no   Have you been seen in the emergency room and/or had an admission in a hospital since we last saw you?  no   Have you had your routine dental cleaning in the past 6 months?  no     Do you have an active MyChart account? If no, what is the barrier?   Yes    Patient Care Team:  MARINA Murcia CNP as PCP - General (Family Nurse Practitioner)  MARINA Murcia CNP as PCP [facility]  Bev Welch MD as Obstetrician (Perinatology)    Medical History Review  Past Medical, Family, and Social History reviewed and  contribute to the patient presenting condition    Health Maintenance   Topic Date Due    Hepatitis C screen  1985    Varicella vaccine (1 of 2 - 2-dose childhood series) 07/13/1987    Flu vaccine (1) 09/01/2020    Cervical cancer screen  05/03/2023    DTaP/Tdap/Td vaccine (3 - Td) 02/27/2029    HIV screen  Completed    Hepatitis A vaccine  Aged Out    Hepatitis B vaccine  Aged Out    Hib vaccine  Aged Out    Meningococcal (ACWY) vaccine  Aged Out    Pneumococcal 0-64 years Vaccine  Aged Out

## 2021-01-23 LAB
C DIFF AG + TOXIN: ABNORMAL
CAMPYLOBACTER PCR: NORMAL
E COLI ENTEROTOXIGENIC PCR: NORMAL
PLESIOMONAS SHIGELLOIDES PCR: NORMAL
SALMONELLA PCR: NORMAL
SHIGATOXIN GENE PCR: NORMAL
SHIGELLA SP PCR: NORMAL
SPECIMEN DESCRIPTION: ABNORMAL
SPECIMEN DESCRIPTION: NORMAL
VIBRIO PCR: NORMAL
YERSINIA ENTEROCOLITICA PCR: NORMAL

## 2021-01-23 RX ORDER — METRONIDAZOLE 500 MG/1
500 TABLET ORAL 3 TIMES DAILY
Qty: 30 TABLET | Refills: 0 | Status: SHIPPED | OUTPATIENT
Start: 2021-01-23 | End: 2021-02-02

## 2021-01-23 NOTE — TELEPHONE ENCOUNTER
From: Effie Nesbitt  To: Patrice Umaña, MARINA - CNP  Sent: 1/22/2021 8:39 PM EST  Subject: Test Results Question    Hello,  I see my result showed up. Where do we go from there?   Thanks,  Elizabeth Heath

## 2021-02-02 ENCOUNTER — PATIENT MESSAGE (OUTPATIENT)
Dept: FAMILY MEDICINE CLINIC | Age: 36
End: 2021-02-02

## 2021-02-02 RX ORDER — VANCOMYCIN HYDROCHLORIDE 125 MG/1
125 CAPSULE ORAL 4 TIMES DAILY
Qty: 12 CAPSULE | Refills: 0 | Status: SHIPPED | OUTPATIENT
Start: 2021-02-02 | End: 2021-02-05

## 2021-02-02 RX ORDER — SUMATRIPTAN 50 MG/1
50 TABLET, FILM COATED ORAL
Qty: 9 TABLET | Refills: 0 | Status: SHIPPED | OUTPATIENT
Start: 2021-02-02 | End: 2021-05-07 | Stop reason: CLARIF

## 2021-02-02 NOTE — TELEPHONE ENCOUNTER
From: Ginette Ivy  To: MARINA Chery CNP  Sent: 2/2/2021 1:01 PM EST  Subject: Test Results Question    I am absolutely not dehydrated. I have been drinking water all day. Im drinking a powerade a day and eating yogurt. Also taking a probiotic. Yes, im very stressed. No vision issues as in blurry, but when my head hurts it definitely hurts to open my eyes. Yes I've tried excedrin, it does not help. I've locked myself in our room with the pillow on my head multiple times. I dont know what else to do. Im not sure if I need more meds. I definitely don't WANT to take them. Chastity Heman Chastity Heman Chastity Heman I like rocktas! Lol! I do not have what I would call normal poop though. It is still the same just 3 to 5ish times a day not more than 10. If that makes sense? I apologize for such the pleasant conversation:)  Romeo Montes      ----- Message -----   MARINA Beard CNP   Sent:2/2/2021 11:50 AM EST   To:Amparo Cuevas   Subject:RE: Test Results Question    Do you feel dehydrated? Causing headaches? Stressed? Vision issues? Tried any excedrin? Do you feel you need more antibiotics for the diarrhea?    navi      ----- Message -----   From:Amparo Cuevas   Sent:2/2/2021 11:33 AM EST   To:MARINA Dunn CNP   Subject:Test Results Question      Hello,  Yesterday I finished the meds. Im feeling sooo much better! My mom says you need to know that the frequency has went down, but the consistency is still the same. I am also still having horrible headaches. Im not sure if its all related or whats going on. Thank you,  Rohini      ----- Message -----   MARINA Beard CNP   Sent:1/24/2021 7:09 PM EST   To:Amparo Cuevas   Subject:RE: Test Results Question    I hope you are already feeling better.  Inessa Bazan had called me and told me yesterday am and got you med!   navi      ----- Message -----   From:Amparo Cuevas   Sent:1/22/2021 8:39 PM EST   To:MARINA Dunn CNP   Subject:Test Results Question    Hello,  I see my result showed up. Where do we go from there?   Thanks,  Missy

## 2021-02-02 NOTE — TELEPHONE ENCOUNTER
From: Papa Mccollum  To: MARINA Saravia - CNP  Sent: 2/2/2021 11:33 AM EST  Subject: Test Results Question      Hello,  Yesterday I finished the meds. Im feeling sooo much better! My mom says you need to know that the frequency has went down, but the consistency is still the same. I am also still having horrible headaches. Im not sure if its all related or whats going on. Thank you,  Rohini      ----- Message -----   MARINA Barraza - CNP   Sent:1/24/2021 7:09 PM EST   To:Amparo Munoz   Subject:RE: Test Results Question    I hope you are already feeling better. Thea Shin had called me and told me yesterday am and got you med!   navi      ----- Message -----   From:Amparo Munoz   Sent:1/22/2021 8:39 PM EST   To:MARINA Luu - CNP   Subject:Test Results Question    Hello,  I see my result showed up. Where do we go from there?   Thanks,  Hedy Desai

## 2021-02-15 ENCOUNTER — PATIENT MESSAGE (OUTPATIENT)
Dept: FAMILY MEDICINE CLINIC | Age: 36
End: 2021-02-15

## 2021-02-15 DIAGNOSIS — R19.7 DIARRHEA, UNSPECIFIED TYPE: ICD-10-CM

## 2021-02-15 DIAGNOSIS — R10.9 ABDOMINAL PAIN, UNSPECIFIED ABDOMINAL LOCATION: Primary | ICD-10-CM

## 2021-02-15 NOTE — TELEPHONE ENCOUNTER
From: Jerad Adrian  To: Bibi Rivera, APRN - CNP  Sent: 2/15/2021 9:55 AM EST  Subject: Prescription Question    Hi! I am so sorry to keep bugging you! I was feeling better for a few days, then as of yesterday I hurt again (I dont know if im supposed to say my bladder or uterus, something right there) and well I had another accident. I need to stay close to the bathroom again I guess! Do you want me to give it some more time or. ...... no idea really. Whats next?   Thanks,  Stefan Kaplan

## 2021-02-16 ENCOUNTER — HOSPITAL ENCOUNTER (OUTPATIENT)
Dept: CT IMAGING | Facility: CLINIC | Age: 36
Discharge: HOME | End: 2021-02-18
Payer: COMMERCIAL

## 2021-02-16 ENCOUNTER — HOSPITAL ENCOUNTER (OUTPATIENT)
Dept: CT IMAGING | Age: 36
Discharge: HOME | End: 2021-02-18
Payer: COMMERCIAL

## 2021-02-16 DIAGNOSIS — R19.7 DIARRHEA, UNSPECIFIED TYPE: ICD-10-CM

## 2021-02-16 DIAGNOSIS — R10.9 ABDOMINAL PAIN, UNSPECIFIED ABDOMINAL LOCATION: ICD-10-CM

## 2021-02-16 PROCEDURE — 6360000004 HC RX CONTRAST MEDICATION: Performed by: NURSE PRACTITIONER

## 2021-02-16 PROCEDURE — 2580000003 HC RX 258: Performed by: NURSE PRACTITIONER

## 2021-02-16 PROCEDURE — 74177 CT ABD & PELVIS W/CONTRAST: CPT

## 2021-02-16 RX ORDER — 0.9 % SODIUM CHLORIDE 0.9 %
70 INTRAVENOUS SOLUTION INTRAVENOUS ONCE
Status: COMPLETED | OUTPATIENT
Start: 2021-02-16 | End: 2021-02-16

## 2021-02-16 RX ORDER — SODIUM CHLORIDE 0.9 % (FLUSH) 0.9 %
10 SYRINGE (ML) INJECTION PRN
Status: DISCONTINUED | OUTPATIENT
Start: 2021-02-16 | End: 2021-02-17 | Stop reason: SDUPTHER

## 2021-02-16 RX ADMIN — IOPAMIDOL 75 ML: 755 INJECTION, SOLUTION INTRAVENOUS at 16:57

## 2021-02-16 RX ADMIN — SODIUM CHLORIDE, PRESERVATIVE FREE 10 ML: 5 INJECTION INTRAVENOUS at 16:58

## 2021-02-16 RX ADMIN — SODIUM CHLORIDE 70 ML: 9 INJECTION, SOLUTION INTRAVENOUS at 16:57

## 2021-02-17 ENCOUNTER — APPOINTMENT (OUTPATIENT)
Dept: GENERAL RADIOLOGY | Age: 36
End: 2021-02-17
Payer: COMMERCIAL

## 2021-02-17 ENCOUNTER — PATIENT MESSAGE (OUTPATIENT)
Dept: FAMILY MEDICINE CLINIC | Age: 36
End: 2021-02-17

## 2021-02-17 ENCOUNTER — HOSPITAL ENCOUNTER (OUTPATIENT)
Age: 36
Setting detail: OBSERVATION
Discharge: HOME | End: 2021-02-18
Attending: EMERGENCY MEDICINE | Admitting: INTERNAL MEDICINE
Payer: COMMERCIAL

## 2021-02-17 DIAGNOSIS — R19.7 DIARRHEA, UNSPECIFIED TYPE: Primary | ICD-10-CM

## 2021-02-17 DIAGNOSIS — R10.30 LOWER ABDOMINAL PAIN: ICD-10-CM

## 2021-02-17 PROBLEM — A04.72 C. DIFFICILE COLITIS: Status: ACTIVE | Noted: 2021-02-17

## 2021-02-17 PROBLEM — R10.9 ABDOMINAL PAIN: Status: ACTIVE | Noted: 2021-02-17

## 2021-02-17 LAB
ABSOLUTE EOS #: 0.18 K/UL (ref 0–0.44)
ABSOLUTE IMMATURE GRANULOCYTE: 0.12 K/UL (ref 0–0.3)
ABSOLUTE LYMPH #: 2.22 K/UL (ref 1.1–3.7)
ABSOLUTE MONO #: 0.92 K/UL (ref 0.1–1.2)
ALBUMIN SERPL-MCNC: 5.2 G/DL (ref 3.5–5.2)
ALBUMIN/GLOBULIN RATIO: ABNORMAL (ref 1–2.5)
ALP BLD-CCNC: 60 U/L (ref 35–104)
ALT SERPL-CCNC: 17 U/L (ref 5–33)
ANION GAP SERPL CALCULATED.3IONS-SCNC: 18 MMOL/L (ref 9–17)
AST SERPL-CCNC: 20 U/L
BASOPHILS # BLD: 1 % (ref 0–2)
BASOPHILS ABSOLUTE: 0.09 K/UL (ref 0–0.2)
BILIRUB SERPL-MCNC: 0.39 MG/DL (ref 0.3–1.2)
BILIRUBIN DIRECT: 0.11 MG/DL
BILIRUBIN, INDIRECT: 0.28 MG/DL (ref 0–1)
BUN BLDV-MCNC: 8 MG/DL (ref 6–20)
BUN/CREAT BLD: 13 (ref 9–20)
CALCIUM SERPL-MCNC: 10.3 MG/DL (ref 8.6–10.4)
CHLORIDE BLD-SCNC: 100 MMOL/L (ref 98–107)
CO2: 18 MMOL/L (ref 20–31)
CREAT SERPL-MCNC: 0.6 MG/DL (ref 0.5–0.9)
DATE, STOOL #1: ABNORMAL
DATE, STOOL #2: ABNORMAL
DATE, STOOL #3: ABNORMAL
DIFFERENTIAL TYPE: ABNORMAL
DIRECT EXAM: NORMAL
EOSINOPHILS RELATIVE PERCENT: 1 % (ref 1–4)
GFR AFRICAN AMERICAN: >60 ML/MIN
GFR NON-AFRICAN AMERICAN: >60 ML/MIN
GFR SERPL CREATININE-BSD FRML MDRD: ABNORMAL ML/MIN/{1.73_M2}
GFR SERPL CREATININE-BSD FRML MDRD: ABNORMAL ML/MIN/{1.73_M2}
GLOBULIN: ABNORMAL G/DL (ref 1.5–3.8)
GLUCOSE BLD-MCNC: 93 MG/DL (ref 70–99)
HCT VFR BLD CALC: 48.9 % (ref 36.3–47.1)
HEMOCCULT SP1 STL QL: POSITIVE
HEMOCCULT SP2 STL QL: ABNORMAL
HEMOCCULT SP3 STL QL: ABNORMAL
HEMOGLOBIN: 15.3 G/DL (ref 11.9–15.1)
IMMATURE GRANULOCYTES: 1 %
LIPASE: 28 U/L (ref 13–60)
LYMPHOCYTES # BLD: 12 % (ref 24–43)
Lab: NORMAL
MCH RBC QN AUTO: 29 PG (ref 25.2–33.5)
MCHC RBC AUTO-ENTMCNC: 31.3 G/DL (ref 28.4–34.8)
MCV RBC AUTO: 92.6 FL (ref 82.6–102.9)
MONOCYTES # BLD: 5 % (ref 3–12)
NRBC AUTOMATED: 0 PER 100 WBC
PDW BLD-RTO: 13.4 % (ref 11.8–14.4)
PLATELET # BLD: 360 K/UL (ref 138–453)
PLATELET ESTIMATE: ABNORMAL
PMV BLD AUTO: 8.5 FL (ref 8.1–13.5)
POTASSIUM SERPL-SCNC: 4.2 MMOL/L (ref 3.7–5.3)
RBC # BLD: 5.28 M/UL (ref 3.95–5.11)
RBC # BLD: ABNORMAL 10*6/UL
SARS-COV-2, RAPID: NOT DETECTED
SEG NEUTROPHILS: 80 % (ref 36–65)
SEGMENTED NEUTROPHILS ABSOLUTE COUNT: 14.35 K/UL (ref 1.5–8.1)
SODIUM BLD-SCNC: 136 MMOL/L (ref 135–144)
SPECIMEN DESCRIPTION: NORMAL
SPECIMEN DESCRIPTION: NORMAL
TIME, STOOL #1: 1515
TIME, STOOL #2: ABNORMAL
TIME, STOOL #3: ABNORMAL
TOTAL PROTEIN: 8.9 G/DL (ref 6.4–8.3)
WBC # BLD: 17.9 K/UL (ref 3.5–11.3)
WBC # BLD: ABNORMAL 10*3/UL

## 2021-02-17 PROCEDURE — 87506 IADNA-DNA/RNA PROBE TQ 6-11: CPT

## 2021-02-17 PROCEDURE — 87329 GIARDIA AG IA: CPT

## 2021-02-17 PROCEDURE — 74018 RADEX ABDOMEN 1 VIEW: CPT

## 2021-02-17 PROCEDURE — APPSS45 APP SPLIT SHARED TIME 31-45 MINUTES: Performed by: NURSE PRACTITIONER

## 2021-02-17 PROCEDURE — 87425 ROTAVIRUS AG IA: CPT

## 2021-02-17 PROCEDURE — 6370000000 HC RX 637 (ALT 250 FOR IP): Performed by: NURSE PRACTITIONER

## 2021-02-17 PROCEDURE — 99283 EMERGENCY DEPT VISIT LOW MDM: CPT

## 2021-02-17 PROCEDURE — 83690 ASSAY OF LIPASE: CPT

## 2021-02-17 PROCEDURE — G0378 HOSPITAL OBSERVATION PER HR: HCPCS

## 2021-02-17 PROCEDURE — 2580000003 HC RX 258: Performed by: EMERGENCY MEDICINE

## 2021-02-17 PROCEDURE — U0002 COVID-19 LAB TEST NON-CDC: HCPCS

## 2021-02-17 PROCEDURE — 6370000000 HC RX 637 (ALT 250 FOR IP): Performed by: EMERGENCY MEDICINE

## 2021-02-17 PROCEDURE — 87324 CLOSTRIDIUM AG IA: CPT

## 2021-02-17 PROCEDURE — 96365 THER/PROPH/DIAG IV INF INIT: CPT

## 2021-02-17 PROCEDURE — 99254 IP/OBS CNSLTJ NEW/EST MOD 60: CPT | Performed by: INTERNAL MEDICINE

## 2021-02-17 PROCEDURE — 80048 BASIC METABOLIC PNL TOTAL CA: CPT

## 2021-02-17 PROCEDURE — 82705 FATS/LIPIDS FECES QUAL: CPT

## 2021-02-17 PROCEDURE — 96375 TX/PRO/DX INJ NEW DRUG ADDON: CPT

## 2021-02-17 PROCEDURE — 82272 OCCULT BLD FECES 1-3 TESTS: CPT

## 2021-02-17 PROCEDURE — 2580000003 HC RX 258: Performed by: NURSE PRACTITIONER

## 2021-02-17 PROCEDURE — 2500000003 HC RX 250 WO HCPCS: Performed by: NURSE PRACTITIONER

## 2021-02-17 PROCEDURE — 87449 NOS EACH ORGANISM AG IA: CPT

## 2021-02-17 PROCEDURE — 85025 COMPLETE CBC W/AUTO DIFF WBC: CPT

## 2021-02-17 PROCEDURE — 80076 HEPATIC FUNCTION PANEL: CPT

## 2021-02-17 PROCEDURE — 87328 CRYPTOSPORIDIUM AG IA: CPT

## 2021-02-17 PROCEDURE — 99219 PR INITIAL OBSERVATION CARE/DAY 50 MINUTES: CPT | Performed by: NURSE PRACTITIONER

## 2021-02-17 PROCEDURE — 81025 URINE PREGNANCY TEST: CPT

## 2021-02-17 RX ORDER — 0.9 % SODIUM CHLORIDE 0.9 %
1000 INTRAVENOUS SOLUTION INTRAVENOUS ONCE
Status: COMPLETED | OUTPATIENT
Start: 2021-02-17 | End: 2021-02-17

## 2021-02-17 RX ORDER — SODIUM CHLORIDE 0.9 % (FLUSH) 0.9 %
10 SYRINGE (ML) INJECTION EVERY 12 HOURS SCHEDULED
Status: DISCONTINUED | OUTPATIENT
Start: 2021-02-17 | End: 2021-02-18 | Stop reason: HOSPADM

## 2021-02-17 RX ORDER — POTASSIUM CHLORIDE 20 MEQ/1
40 TABLET, EXTENDED RELEASE ORAL PRN
Status: DISCONTINUED | OUTPATIENT
Start: 2021-02-17 | End: 2021-02-18 | Stop reason: HOSPADM

## 2021-02-17 RX ORDER — SODIUM CHLORIDE 9 MG/ML
INJECTION, SOLUTION INTRAVENOUS CONTINUOUS
Status: DISCONTINUED | OUTPATIENT
Start: 2021-02-17 | End: 2021-02-18 | Stop reason: HOSPADM

## 2021-02-17 RX ORDER — POLYETHYLENE GLYCOL 3350 17 G/17G
17 POWDER, FOR SOLUTION ORAL DAILY PRN
Status: DISCONTINUED | OUTPATIENT
Start: 2021-02-17 | End: 2021-02-18 | Stop reason: HOSPADM

## 2021-02-17 RX ORDER — NICOTINE 21 MG/24HR
1 PATCH, TRANSDERMAL 24 HOURS TRANSDERMAL DAILY PRN
Status: DISCONTINUED | OUTPATIENT
Start: 2021-02-17 | End: 2021-02-18 | Stop reason: HOSPADM

## 2021-02-17 RX ORDER — MAGNESIUM SULFATE 1 G/100ML
1000 INJECTION INTRAVENOUS PRN
Status: DISCONTINUED | OUTPATIENT
Start: 2021-02-17 | End: 2021-02-18 | Stop reason: HOSPADM

## 2021-02-17 RX ORDER — ONDANSETRON 2 MG/ML
4 INJECTION INTRAMUSCULAR; INTRAVENOUS EVERY 6 HOURS PRN
Status: DISCONTINUED | OUTPATIENT
Start: 2021-02-17 | End: 2021-02-18 | Stop reason: HOSPADM

## 2021-02-17 RX ORDER — SODIUM CHLORIDE 0.9 % (FLUSH) 0.9 %
10 SYRINGE (ML) INJECTION PRN
Status: DISCONTINUED | OUTPATIENT
Start: 2021-02-17 | End: 2021-02-18 | Stop reason: HOSPADM

## 2021-02-17 RX ORDER — PANTOPRAZOLE SODIUM 40 MG/1
40 TABLET, DELAYED RELEASE ORAL
Status: DISCONTINUED | OUTPATIENT
Start: 2021-02-18 | End: 2021-02-18 | Stop reason: HOSPADM

## 2021-02-17 RX ORDER — POTASSIUM CHLORIDE 7.45 MG/ML
10 INJECTION INTRAVENOUS PRN
Status: DISCONTINUED | OUTPATIENT
Start: 2021-02-17 | End: 2021-02-18 | Stop reason: HOSPADM

## 2021-02-17 RX ORDER — ACETAMINOPHEN 325 MG/1
650 TABLET ORAL EVERY 6 HOURS PRN
Status: DISCONTINUED | OUTPATIENT
Start: 2021-02-17 | End: 2021-02-18 | Stop reason: HOSPADM

## 2021-02-17 RX ORDER — HYDROCODONE BITARTRATE AND ACETAMINOPHEN 5; 325 MG/1; MG/1
1 TABLET ORAL EVERY 4 HOURS PRN
Status: DISCONTINUED | OUTPATIENT
Start: 2021-02-17 | End: 2021-02-18 | Stop reason: HOSPADM

## 2021-02-17 RX ORDER — PROMETHAZINE HYDROCHLORIDE 12.5 MG/1
12.5 TABLET ORAL EVERY 6 HOURS PRN
Status: DISCONTINUED | OUTPATIENT
Start: 2021-02-17 | End: 2021-02-18 | Stop reason: HOSPADM

## 2021-02-17 RX ORDER — ACETAMINOPHEN 500 MG
1000 TABLET ORAL ONCE
Status: COMPLETED | OUTPATIENT
Start: 2021-02-17 | End: 2021-02-17

## 2021-02-17 RX ORDER — ACETAMINOPHEN 650 MG/1
650 SUPPOSITORY RECTAL EVERY 6 HOURS PRN
Status: DISCONTINUED | OUTPATIENT
Start: 2021-02-17 | End: 2021-02-18 | Stop reason: HOSPADM

## 2021-02-17 RX ADMIN — ACETAMINOPHEN 1000 MG: 500 TABLET ORAL at 15:52

## 2021-02-17 RX ADMIN — ACETAMINOPHEN 650 MG: 325 TABLET ORAL at 19:29

## 2021-02-17 RX ADMIN — SODIUM CHLORIDE 1000 ML: 9 INJECTION, SOLUTION INTRAVENOUS at 13:19

## 2021-02-17 RX ADMIN — FAMOTIDINE 20 MG: 10 INJECTION, SOLUTION INTRAVENOUS at 20:44

## 2021-02-17 RX ADMIN — Medication 125 MG: at 18:35

## 2021-02-17 RX ADMIN — SODIUM CHLORIDE: 9 INJECTION, SOLUTION INTRAVENOUS at 20:44

## 2021-02-17 RX ADMIN — METRONIDAZOLE 500 MG: 500 INJECTION, SOLUTION INTRAVENOUS at 17:06

## 2021-02-17 ASSESSMENT — ENCOUNTER SYMPTOMS
COLOR CHANGE: 0
ABDOMINAL PAIN: 1
DIARRHEA: 1
RHINORRHEA: 0
EYES NEGATIVE: 1
EYE DISCHARGE: 0
NAUSEA: 0
BLOOD IN STOOL: 1
ANAL BLEEDING: 1
EYE REDNESS: 0
RESPIRATORY NEGATIVE: 1
COUGH: 0
SINUS PAIN: 0
SHORTNESS OF BREATH: 0
WHEEZING: 0
ABDOMINAL DISTENTION: 1
PHOTOPHOBIA: 0
VOMITING: 0
SORE THROAT: 0
SINUS PRESSURE: 0
RECTAL PAIN: 1

## 2021-02-17 ASSESSMENT — PAIN DESCRIPTION - DESCRIPTORS: DESCRIPTORS: HEADACHE

## 2021-02-17 ASSESSMENT — PAIN SCALES - GENERAL
PAINLEVEL_OUTOF10: 3
PAINLEVEL_OUTOF10: 2
PAINLEVEL_OUTOF10: 5

## 2021-02-17 ASSESSMENT — PAIN DESCRIPTION - LOCATION: LOCATION: HEAD

## 2021-02-17 ASSESSMENT — PAIN DESCRIPTION - PAIN TYPE: TYPE: ACUTE PAIN

## 2021-02-17 NOTE — H&P
[facility]  Office: [address], DO, John Kind, DO, Kajalalicia Post, DO, [place] Kendy Blood, DO, Rob Ashton MD, Raffaele Jacobs MD, Edgard Barrientos MD, El Redding MD, Tiana Bosch MD, Eulogio Shane MD, Maria Esther Washington MD, Roque Morris MD, Kate Gaspar MD, Sahara Esqueda DO, Kaylynn Thomas MD, Twila Dasilva MD, Candie Boas, DO, Adina Dumont MD,  Jen Webster DO, Consuelo Alanis MD, Rueben Severin, MD, [facility], [facility], [facility], CNP, Micki Sue, CNP, Shalom Henning, CNS, Yuri Medina, CNP, Crow Goodrich, CNP, Laith Ramirez, CNP, Ana Grijalva, CNP, Kriss Puentes, CNP, Prashanth Espitia PA-C, Ki Duenas [facility], Marcos Xiong, CNP, Berkley Leggett, CNP, Hema Dsouza, CNP, Clayton Little, CNP, Aminta Al, [facility]    HISTORY AND PHYSICAL EXAMINATION            Date:   2/17/2021  Patient name:  Ino Sanchez  Date of admission:  2/17/2021 12:08 PM  MRN:   [id number]  Account:  [de-identified]  YOB: 1985  PCP:    MARINA Camp CNP  Room:   [place] [address]  Code Status:    Prior    Chief Complaint:     Chief Complaint   Patient presents with    Abdominal Pain    Fever    Diarrhea     5 weeks       History Obtained From:     patient    History of Present Illness:     Ino Sanchez is a 30 y.o. Non-/non  female who presents with Abdominal Pain, Fever, and Diarrhea (5 weeks)   and is admitted to the hospital for the management of C. difficile colitis. Patient reports the emergency department with bloody diarrhea and lower abdominal cramps. Patient was reportedly treated for a sinus infection approximately 5 weeks ago with a course of antibiotics. After that time the patient developed infectious diarrhea which was later diagnosed as C. difficile.   Patient was initially started on oral Flagyl and had a poor response to this and patient was transitioned to oral vancomycin but for only 3 days. Patient completed this course approximately 2 weeks ago. Patient reported that for the 2 weeks preceding today's visit her symptoms wax and wane and she thought she was getting better. Over the past 48 hours the patient has developed severe lower abdominal cramps with bloody diarrhea. Patient states that she is having bowel movements approximately 5-7 times per day. GI was consulted out of the emergency department and they have requested the patient be admitted to medicine services for IV antibiotics and initiation of a longer course of oral vancomycin. Past Medical History:     Past Medical History:   Diagnosis Date    Acute anxiety         Past Surgical History:     Past Surgical History:   Procedure Laterality Date     SECTION       SECTION N/A 2019     SECTION performed by Moises Ewing MD at [facility] 9293      r fallopian tube gone    UMBILICAL HERNIA REPAIR N/A     HERNIA UMBILICAL REPAIR performed by Parth Marte MD at [address]          Medications Prior to Admission:     Prior to Admission medications    Medication Sig Start Date End Date Taking? Authorizing Provider   SUMAtriptan (IMITREX) 50 MG tablet Take 1 tablet by mouth once as needed for Migraine (may repeat dose in 1 hour if not effective) 21  MARINA Saravia CNP   buPROPion HCl (WELLBUTRIN PO) Take by mouth    Historical Provider, MD   ibuprofen (ADVIL;MOTRIN) 800 MG tablet Take 1 tablet by mouth 3 times daily as needed for Pain 20  MARINA Yuen CNP        Allergies:     Tape [place] tape]    Social History:     Tobacco:    reports that she has quit smoking. She has never used smokeless tobacco.  Alcohol:      reports current alcohol use. Drug Use:  reports current drug use. Drug: Marijuana.     Family History:     Family History   Problem Relation Age of Onset    Rheum Arthritis Father     Seizures Brother     Stroke Brother     Mental Illness Mother         anxiety and depression       Review of Systems:     Positive and Negative as described in HPI. Review of Systems   Constitutional: Negative for fatigue, fever and unexpected weight change. HENT: Negative for sinus pressure and sinus pain. Eyes: Negative. Negative for photophobia. Respiratory: Negative. Negative for shortness of breath and wheezing. Cardiovascular: Negative. Gastrointestinal: Positive for abdominal distention, abdominal pain, anal bleeding, blood in stool, diarrhea and rectal pain. Negative for nausea and vomiting. Endocrine: Negative. Negative for cold intolerance and heat intolerance. Genitourinary: Negative. Negative for frequency and vaginal pain. Musculoskeletal: Negative for joint swelling and neck stiffness. Skin: Negative. Negative for pallor and wound. Neurological: Negative. Negative for syncope and weakness. Hematological: Negative. Negative for adenopathy. Does not bruise/bleed easily. Psychiatric/Behavioral: Negative. Negative for self-injury. The patient is not nervous/anxious. Physical Exam:   BP (!) 140/79   Pulse 94   Temp 98.1 °F (36.7 °C) (Oral)   Resp 16   Ht 5' 3\" (1.6 m)   Wt 161 lb (73 kg)   LMP  (LMP Unknown)   SpO2 100%   BMI 28.52 kg/m²   Temp (24hrs), Av.1 °F (36.7 °C), Min:98.1 °F (36.7 °C), Max:98.1 °F (36.7 °C)    No results for input(s): POCGLU in the last 72 hours. No intake or output data in the 24 hours ending 21 1701    Physical Exam  Constitutional:       General: She is not in acute distress. Appearance: Normal appearance. She is obese. She is not ill-appearing or toxic-appearing. HENT:      Head: Normocephalic and atraumatic.       Nose: Nose normal.      Mouth/Throat:      Mouth: Mucous membranes are moist.   Eyes:      Extraocular Movements: Extraocular movements intact. Pupils: Pupils are equal, round, and reactive to light. Neck:      Musculoskeletal: Normal range of motion. Cardiovascular:      Rate and Rhythm: Normal rate and regular rhythm. Pulses: Normal pulses. Heart sounds: Normal heart sounds. No murmur. Pulmonary:      Effort: Pulmonary effort is normal. No respiratory distress. Breath sounds: Normal breath sounds. Abdominal:      General: There is no distension. Palpations: Abdomen is soft. Tenderness: There is abdominal tenderness. Musculoskeletal: Normal range of motion. General: No swelling, tenderness or deformity. Skin:     General: Skin is warm and dry. Coloration: Skin is not jaundiced. Findings: No bruising. Neurological:      General: No focal deficit present. Mental Status: She is alert and oriented to person, place, and time. Mental status is at baseline.    Psychiatric:         Mood and Affect: Mood normal.         Investigations:      Laboratory Testing:  Recent Results (from the past 24 hour(s))   Basic Metabolic Panel    Collection Time: 02/17/21  1:05 PM   Result Value Ref Range    Glucose 93 70 - 99 mg/dL    BUN 8 6 - 20 mg/dL    CREATININE 0.60 0.50 - 0.90 mg/dL    Bun/Cre Ratio 13 9 - 20    Calcium 10.3 8.6 - 10.4 mg/dL    Sodium 136 135 - 144 mmol/L    Potassium 4.2 3.7 - 5.3 mmol/L    Chloride 100 98 - 107 mmol/L    CO2 18 (L) 20 - 31 mmol/L    Anion Gap 18 (H) 9 - 17 mmol/L    GFR Non-African American >60 >60 mL/min    GFR African American >60 >60 mL/min    GFR Comment          GFR Staging NOT REPORTED    CBC Auto Differential    Collection Time: 02/17/21  1:05 PM   Result Value Ref Range    WBC 17.9 (H) 3.5 - 11.3 k/uL    RBC 5.28 (H) 3.95 - 5.11 m/uL    Hemoglobin 15.3 (H) 11.9 - 15.1 g/dL    Hematocrit 48.9 (H) 36.3 - 47.1 %    MCV 92.6 82.6 - 102.9 fL    MCH 29.0 25.2 - 33.5 pg    MCHC 31.3 28.4 - 34.8 g/dL    RDW 13.4 11.8 - 14.4 %    Platelets 840 710 - 461 k/uL    MPV 2/17/2021 Yes    BMI 32.3 2/17/2021 Yes    Abdominal pain 2/17/2021 Yes          Plan:     Patient status observation in the  Med/Surge    1. IV antibiotics, Flagyl  2. P.o. vancomycin  3. Trend lactate, white count, add blood culture and procalcitonin  4. GI consultation underway  5.  Anticipate discharge within 24 hours    Consultations:   IP CONSULT TO GI  IP CONSULT TO HOSPITALIST  IP CONSULT TO GI        MARINA Foster - NP  2/17/2021  5:01 PM    Copy sent to Dr. Betina Quezada, APRN - CNP

## 2021-02-17 NOTE — PLAN OF CARE
PRE CONSULT ROUNDING NOTE  HPI  30year old female with pmh of c diff colitis diagnosed in January of this year who presented to the ED for abdominal pain with diarrhea. She states she was diagnosed with c diff on January 22. She said she had non bloody diarrhea one week prior to diagnosis. Her stool cultures were negative. She finished a 10 day course of oral flagyl but the diarrhea did not improve. She then had three days of oral vancomycin with no change in the diarrhea. She has 5-6 episodes per day, reports fevers at home. Today she has had 5 episodes of blood tinged diarrhea/mucus. She developed suprapubic pain yesterday that has increased today. No relief with tylenol. Reports a 10# weight loss in the last month. Ct abdomen on 2/16 was unremarkable. Wbc 17.9 hgb 15. 3. her covid testing on 1/6 was negative. She denies chills dysphagia odynophagia melena rash hematemesis. She denies NSAID use.      Endoscopy none  Family reports no hx of liver pancreatic stomach or colon cancer no UC/crohns  Social quit smoking no etoh marijuana use  BP (!) 140/79   Pulse 94   Temp 98.1 °F (36.7 °C) (Oral)   Resp 16   Ht 5' 3\" (1.6 m)   Wt 161 lb (73 kg)   LMP  (LMP Unknown)   SpO2 100%   BMI 28.52 kg/m²     ROS as above meds labs imaging and past medical records were reviewed    Exam  General Appearance: alert and oriented to person, place and time, well-developed and well-nourished, in no acute distress  Skin: warm and dry, no rash or erythema  Head: normocephalic and atraumatic  Eyes: pupils equal, round, and reactive to light, extraocular eye movements intact, conjunctivae normal  ENT: hearing grossly normal bilaterally  Neck: neck supple and non tender without mass, no thyromegaly or thyroid nodules, no cervical lymphadenopathy   Pulmonary/Chest: clear to auscultation bilaterally- no wheezes, rales or rhonchi, normal air movement, no respiratory distress  Cardiovascular: normal rate, regular rhythm, normal S1 and S2, no murmurs, rubs, clicks or gallops, distal pulses intact, no carotid bruits  Abdomen: soft, obese suprapubic tenderness, non-distended, normal bowel sounds, no masses or organomegaly no ascites  Extremities: no cyanosis, clubbing or edema  Musculoskeletal: normal range of motion, no joint swelling, deformity or tenderness  Neurologic: no cranial nerve deficit and muscle strength normal    Assessment  Abdominal pain with diarrhea, subjective fevers  c diff January of this year    Plan  Case d/w dr Lilian Diane, will repeat stool studies and c diff testing.  covid testing as this can also cause diarrhea  Pain mgt  If stool studies/c diff testing negative may need colonoscopy to r/o microscopic colitis autoimmune process  Cbc in am  Consider UA  d/w dr Lenore Henderson will get kub and start flagyl and oral vancomycin  Formal gi consult to follow  . Diandra Smith, APRN - CNP

## 2021-02-17 NOTE — PROGRESS NOTES
Pt admitted to room 2006-2 per wheelchair in stable condition from ED  Oriented to room and surroundings  Bed in lowest position, wheels locked, 2/4 side rails up  Call light in reach, room free of clutter, adequate lighting provided  Denies any further questions at this time  Instructed to call out with any questions/concerns/new onset of pain and/or n/v   White board updated  Continue to monitor with hourly rounding  Non-skid socks on/at bedside  [address] in place for patient/family to view & ask questions.

## 2021-02-17 NOTE — TELEPHONE ENCOUNTER
Patient called wanting to speak to you. She is in a lot of pain. States CT came back normal. She doesn't know what to do. I advised her to make an appt with you tomorrow but she states she has a fever and is sick. What do you recommend patient to do. Please advise.  Thank you

## 2021-02-17 NOTE — TELEPHONE ENCOUNTER
From: Debra Hernandez  To: Loly Leon APRN - CNP  Sent: 2/17/2021 9:52 AM EST  Subject: Test Results Question    Hello,  I saw the results from the scan. It looks like everything is great! I don't get it though, my stomach hurts more than ever. I'm nauseous and running a fever. This has been weeks of diarrhea, what can I do to make it better? (Still drinking tons of water, drinking yogurt, taking probiotic and drinking powerade) I haven't eaten anything yet today and I'm in the bathroom for the second time.   Thanks

## 2021-02-17 NOTE — ED PROVIDER NOTES
EMERGENCY DEPARTMENT ENCOUNTER    Pt Name: Sofia Holter  MRN: [id number]  [place] 1985  Date of evaluation: 2/17/21  CHIEF COMPLAINT       Chief Complaint   Patient presents with    Abdominal Pain    Fever    Diarrhea     5 weeks     HISTORY OF PRESENT ILLNESS   This is a 36-year-old female that presents with complaints of abdominal pain, fever, diarrhea ongoing for the past 5 weeks. The patient states that she was seen in January, she was diagnosed with C. difficile, she took Flagyl and a few days of vancomycin, she states that her diarrhea has continued, today she had for 5 mildly bloody bowel movements with some loose stool. Patient denies any fevers or chills, she has no vaginal bleeding or discharge. She denies any dysuria hematuria. She complains of some suprapubic abdominal pain and discomfort. REVIEW OF SYSTEMS     Review of Systems   Constitutional: Negative for chills and fever. HENT: Negative for rhinorrhea and sore throat. Eyes: Negative for discharge, redness and visual disturbance. Respiratory: Negative for cough and shortness of breath. Cardiovascular: Negative for chest pain, palpitations and leg swelling. Gastrointestinal: Positive for abdominal pain and diarrhea. Negative for nausea and vomiting. Musculoskeletal: Negative for arthralgias, myalgias and neck pain. Skin: Negative for color change and rash. Neurological: Negative for seizures, weakness and headaches. Psychiatric/Behavioral: Negative for hallucinations, self-injury and suicidal ideas. PASTMEDICAL HISTORY   History reviewed. No pertinent past medical history.   Past Problem List  Patient Active Problem List   Diagnosis Code    H/O gestational diabetes Z80.34    H/O ectopic pregnancy (G2) Z80.59    H/O CS x 2 (G1, G3) Z98.891    Marijuana use F12.90    Declined flu shot Z23    Diastasis recti M62.08    No measureable lower uterine segment R93.89    BMI 32.3 O99.213    Rh+/RI/GBS neg Z67.90    H/O diagnostic laparoscopy w/ R salpingectomy? (G2) Z98.890    RLTCS, RRS 19 M Apg 8/9 Wt 7#7 Z39.2    HRP (high risk pregnancy), unspecified trimester O09.90    Abdominal pain R10.9    C. difficile colitis A04.72     SURGICAL HISTORY       Past Surgical History:   Procedure Laterality Date     SECTION       SECTION N/A 2019     SECTION performed by Deanne Hong MD at [facility] L&D [facility] 9293      r fallopian tube gone    UMBILICAL HERNIA REPAIR N/A 7346    HERNIA UMBILICAL REPAIR performed by Yoli Subramanian MD at [address] [place] [address]       Previous Medications    BUPROPION HCL (WELLBUTRIN PO)    Take by mouth    IBUPROFEN (ADVIL;MOTRIN) 800 MG TABLET    Take 1 tablet by mouth 3 times daily as needed for Pain    SUMATRIPTAN (IMITREX) 50 MG TABLET    Take 1 tablet by mouth once as needed for Migraine (may repeat dose in 1 hour if not effective)     ALLERGIES     is allergic to tape [adhesive tape]. FAMILY HISTORY     She indicated that her mother is alive. She indicated that her father is . She indicated that her brother is alive. SOCIAL HISTORY       Social History     Tobacco Use    Smoking status: Former Smoker    Smokeless tobacco: Never Used   Substance Use Topics    Alcohol use: No     Comment: soc    Drug use: Yes     Types: Marijuana     PHYSICAL EXAM     INITIAL VITALS: BP (!) 140/79   Pulse 94   Temp 98.1 °F (36.7 °C) (Oral)   Resp 16   Ht 5' 3\" (1.6 m)   Wt 161 lb (73 kg)   LMP  (LMP Unknown)   SpO2 100%   BMI 28.52 kg/m²    Physical Exam  Constitutional:       Appearance: Normal appearance. She is well-developed. She is not ill-appearing or toxic-appearing. HENT:      Head: Normocephalic and atraumatic. Eyes:      Conjunctiva/sclera: Conjunctivae normal.      Pupils: Pupils are equal, round, and reactive to light.    Neck: Abnormal; Notable for the following components:       Result Value    CO2 18 (*)     Anion Gap 18 (*)     All other components within normal limits   CBC WITH AUTO DIFFERENTIAL - Abnormal; Notable for the following components:    WBC 17.9 (*)     RBC 5.28 (*)     Hemoglobin 15.3 (*)     Hematocrit 48.9 (*)     Seg Neutrophils 80 (*)     Lymphocytes 12 (*)     Immature Granulocytes 1 (*)     Segs Absolute 14.35 (*)     All other components within normal limits   HEPATIC FUNCTION PANEL - Abnormal; Notable for the following components: Total Protein 8.9 (*)     All other components within normal limits   C DIFF TOXIN/ANTIGEN   GASTROINTESTINAL PANEL, MOLECULAR   LIPASE   URINALYSIS   GIARDIA / CRYPTOSPORIDUM ANTIGENS   BLOOD OCCULT STOOL SCREEN #1   ROTAVIRUS ANTIGEN, STOOL   FECAL FAT, QUALITATIVE       EMERGENCY DEPARTMENTCOURSE:         Vitals:    Vitals:    02/17/21 1151 02/17/21 1153 02/17/21 1155 02/17/21 1515   BP:   124/86 (!) 140/79   Pulse:  104  94   Resp:  16  16   Temp:  98.1 °F (36.7 °C)     TempSrc:  Oral     SpO2:  100%  100%   Weight: 161 lb (73 kg)      Height: 5' 3\" (1.6 m)          The patient was given the following medications while in the emergency department:  Orders Placed This Encounter   Medications    0.9 % sodium chloride bolus    acetaminophen (TYLENOL) tablet 1,000 mg     CONSULTS:  IP CONSULT TO GI  IP CONSULT TO HOSPITALIST  IP CONSULT TO GI    FINAL IMPRESSION      1. Diarrhea, unspecified type    2. Lower abdominal pain          DISPOSITION/PLAN   DISPOSITION Admitted 02/17/2021 03:59:20 PM      PATIENT REFERRED TO:  No follow-up provider specified.   DISCHARGE MEDICATIONS:  New Prescriptions    No medications on file     Aileen Willis MD  Attending Emergency Physician                   Aileen Willis MD  02/17/21 7182

## 2021-02-18 VITALS
HEIGHT: 63 IN | OXYGEN SATURATION: 100 % | SYSTOLIC BLOOD PRESSURE: 116 MMHG | DIASTOLIC BLOOD PRESSURE: 89 MMHG | WEIGHT: 163.8 LBS | RESPIRATION RATE: 16 BRPM | TEMPERATURE: 98 F | BODY MASS INDEX: 29.02 KG/M2 | HEART RATE: 97 BPM

## 2021-02-18 LAB
ABSOLUTE EOS #: 0.17 K/UL (ref 0–0.44)
ABSOLUTE IMMATURE GRANULOCYTE: 0.02 K/UL (ref 0–0.3)
ABSOLUTE LYMPH #: 1.98 K/UL (ref 1.1–3.7)
ABSOLUTE MONO #: 0.52 K/UL (ref 0.1–1.2)
ALBUMIN SERPL-MCNC: 3.9 G/DL (ref 3.5–5.2)
ALBUMIN/GLOBULIN RATIO: ABNORMAL (ref 1–2.5)
ALP BLD-CCNC: 44 U/L (ref 35–104)
ALT SERPL-CCNC: 11 U/L (ref 5–33)
AMYLASE: 67 U/L (ref 28–100)
ANION GAP SERPL CALCULATED.3IONS-SCNC: 10 MMOL/L (ref 9–17)
AST SERPL-CCNC: 12 U/L
BASOPHILS # BLD: 1 % (ref 0–2)
BASOPHILS ABSOLUTE: 0.05 K/UL (ref 0–0.2)
BILIRUB SERPL-MCNC: 0.29 MG/DL (ref 0.3–1.2)
BUN BLDV-MCNC: 5 MG/DL (ref 6–20)
BUN/CREAT BLD: 9 (ref 9–20)
C DIFF AG + TOXIN: NEGATIVE
CALCIUM SERPL-MCNC: 8.3 MG/DL (ref 8.6–10.4)
CAMPYLOBACTER PCR: NORMAL
CHLORIDE BLD-SCNC: 109 MMOL/L (ref 98–107)
CO2: 19 MMOL/L (ref 20–31)
CREAT SERPL-MCNC: 0.56 MG/DL (ref 0.5–0.9)
DIFFERENTIAL TYPE: ABNORMAL
DIRECT EXAM: NORMAL
DIRECT EXAM: NORMAL
E COLI ENTEROTOXIGENIC PCR: NORMAL
EOSINOPHILS RELATIVE PERCENT: 2 % (ref 1–4)
GFR AFRICAN AMERICAN: >60 ML/MIN
GFR NON-AFRICAN AMERICAN: >60 ML/MIN
GFR SERPL CREATININE-BSD FRML MDRD: ABNORMAL ML/MIN/{1.73_M2}
GFR SERPL CREATININE-BSD FRML MDRD: ABNORMAL ML/MIN/{1.73_M2}
GLUCOSE BLD-MCNC: 90 MG/DL (ref 70–99)
HCG, PREGNANCY URINE (POC): NEGATIVE
HCT VFR BLD CALC: 39 % (ref 36.3–47.1)
HEMOGLOBIN: 12.4 G/DL (ref 11.9–15.1)
IMMATURE GRANULOCYTES: 0 %
LACTIC ACID: 1 MMOL/L (ref 0.5–2.2)
LACTOFERRIN, QUAL: ABNORMAL
LIPASE: 19 U/L (ref 13–60)
LYMPHOCYTES # BLD: 21 % (ref 24–43)
Lab: NORMAL
MAGNESIUM: 1.9 MG/DL (ref 1.6–2.6)
MCH RBC QN AUTO: 29 PG (ref 25.2–33.5)
MCHC RBC AUTO-ENTMCNC: 31.8 G/DL (ref 28.4–34.8)
MCV RBC AUTO: 91.3 FL (ref 82.6–102.9)
MONOCYTES # BLD: 6 % (ref 3–12)
NRBC AUTOMATED: 0 PER 100 WBC
PDW BLD-RTO: 13.4 % (ref 11.8–14.4)
PHOSPHORUS: 2.8 MG/DL (ref 2.6–4.5)
PLATELET # BLD: 276 K/UL (ref 138–453)
PLATELET ESTIMATE: ABNORMAL
PLESIOMONAS SHIGELLOIDES PCR: NORMAL
PMV BLD AUTO: 8.7 FL (ref 8.1–13.5)
POTASSIUM SERPL-SCNC: 4 MMOL/L (ref 3.7–5.3)
PROCALCITONIN: 0.05 NG/ML
RBC # BLD: 4.27 M/UL (ref 3.95–5.11)
RBC # BLD: ABNORMAL 10*6/UL
SALMONELLA PCR: NORMAL
SEG NEUTROPHILS: 71 % (ref 36–65)
SEGMENTED NEUTROPHILS ABSOLUTE COUNT: 6.8 K/UL (ref 1.5–8.1)
SHIGATOXIN GENE PCR: NORMAL
SHIGELLA SP PCR: NORMAL
SODIUM BLD-SCNC: 138 MMOL/L (ref 135–144)
SPECIMEN DESCRIPTION: NORMAL
TOTAL PROTEIN: 6.8 G/DL (ref 6.4–8.3)
VIBRIO PCR: NORMAL
WBC # BLD: 9.5 K/UL (ref 3.5–11.3)
WBC # BLD: ABNORMAL 10*3/UL
YERSINIA ENTEROCOLITICA PCR: NORMAL

## 2021-02-18 PROCEDURE — APPNB30 APP NON BILLABLE TIME 0-30 MINS: Performed by: NURSE PRACTITIONER

## 2021-02-18 PROCEDURE — 87040 BLOOD CULTURE FOR BACTERIA: CPT

## 2021-02-18 PROCEDURE — 83605 ASSAY OF LACTIC ACID: CPT

## 2021-02-18 PROCEDURE — 2500000003 HC RX 250 WO HCPCS: Performed by: NURSE PRACTITIONER

## 2021-02-18 PROCEDURE — 96376 TX/PRO/DX INJ SAME DRUG ADON: CPT

## 2021-02-18 PROCEDURE — 6370000000 HC RX 637 (ALT 250 FOR IP): Performed by: NURSE PRACTITIONER

## 2021-02-18 PROCEDURE — 87493 C DIFF AMPLIFIED PROBE: CPT

## 2021-02-18 PROCEDURE — 99232 SBSQ HOSP IP/OBS MODERATE 35: CPT | Performed by: INTERNAL MEDICINE

## 2021-02-18 PROCEDURE — 82150 ASSAY OF AMYLASE: CPT

## 2021-02-18 PROCEDURE — 83735 ASSAY OF MAGNESIUM: CPT

## 2021-02-18 PROCEDURE — 83630 LACTOFERRIN FECAL (QUAL): CPT

## 2021-02-18 PROCEDURE — 96366 THER/PROPH/DIAG IV INF ADDON: CPT

## 2021-02-18 PROCEDURE — 6360000002 HC RX W HCPCS: Performed by: NURSE PRACTITIONER

## 2021-02-18 PROCEDURE — 80053 COMPREHEN METABOLIC PANEL: CPT

## 2021-02-18 PROCEDURE — 36415 COLL VENOUS BLD VENIPUNCTURE: CPT

## 2021-02-18 PROCEDURE — 99225 PR SBSQ OBSERVATION CARE/DAY 25 MINUTES: CPT | Performed by: INTERNAL MEDICINE

## 2021-02-18 PROCEDURE — 85025 COMPLETE CBC W/AUTO DIFF WBC: CPT

## 2021-02-18 PROCEDURE — 83690 ASSAY OF LIPASE: CPT

## 2021-02-18 PROCEDURE — 84100 ASSAY OF PHOSPHORUS: CPT

## 2021-02-18 PROCEDURE — 96375 TX/PRO/DX INJ NEW DRUG ADDON: CPT

## 2021-02-18 PROCEDURE — 84145 PROCALCITONIN (PCT): CPT

## 2021-02-18 PROCEDURE — 2580000003 HC RX 258: Performed by: NURSE PRACTITIONER

## 2021-02-18 PROCEDURE — APPSS45 APP SPLIT SHARED TIME 31-45 MINUTES: Performed by: NURSE PRACTITIONER

## 2021-02-18 PROCEDURE — G0378 HOSPITAL OBSERVATION PER HR: HCPCS

## 2021-02-18 RX ORDER — DIPHENHYDRAMINE HCL 25 MG
25 TABLET ORAL NIGHTLY PRN
COMMUNITY
End: 2021-06-14

## 2021-02-18 RX ADMIN — METRONIDAZOLE 500 MG: 500 INJECTION, SOLUTION INTRAVENOUS at 16:55

## 2021-02-18 RX ADMIN — PROMETHAZINE HYDROCHLORIDE 12.5 MG: 12.5 TABLET ORAL at 02:13

## 2021-02-18 RX ADMIN — PANTOPRAZOLE SODIUM 40 MG: 40 TABLET, DELAYED RELEASE ORAL at 06:32

## 2021-02-18 RX ADMIN — METRONIDAZOLE 500 MG: 500 INJECTION, SOLUTION INTRAVENOUS at 08:28

## 2021-02-18 RX ADMIN — HYDROCODONE BITARTRATE AND ACETAMINOPHEN 1 TABLET: 5; 325 TABLET ORAL at 00:41

## 2021-02-18 RX ADMIN — Medication 125 MG: at 11:52

## 2021-02-18 RX ADMIN — Medication 125 MG: at 16:55

## 2021-02-18 RX ADMIN — SODIUM CHLORIDE: 9 INJECTION, SOLUTION INTRAVENOUS at 16:01

## 2021-02-18 RX ADMIN — Medication 125 MG: at 06:33

## 2021-02-18 RX ADMIN — Medication 125 MG: at 00:41

## 2021-02-18 RX ADMIN — SODIUM CHLORIDE: 9 INJECTION, SOLUTION INTRAVENOUS at 05:06

## 2021-02-18 RX ADMIN — METRONIDAZOLE 500 MG: 500 INJECTION, SOLUTION INTRAVENOUS at 00:38

## 2021-02-18 RX ADMIN — ONDANSETRON 4 MG: 2 INJECTION INTRAMUSCULAR; INTRAVENOUS at 02:41

## 2021-02-18 RX ADMIN — FAMOTIDINE 20 MG: 10 INJECTION, SOLUTION INTRAVENOUS at 08:28

## 2021-02-18 ASSESSMENT — PAIN DESCRIPTION - ONSET: ONSET: ON-GOING

## 2021-02-18 ASSESSMENT — PAIN DESCRIPTION - LOCATION: LOCATION: ABDOMEN;PELVIS

## 2021-02-18 ASSESSMENT — PAIN DESCRIPTION - ORIENTATION: ORIENTATION: LOWER

## 2021-02-18 ASSESSMENT — PAIN DESCRIPTION - PAIN TYPE: TYPE: ACUTE PAIN

## 2021-02-18 NOTE — PROGRESS NOTES
antibiotics safely? Be safe with medicine. Take your antibiotics as directed. Do not stop taking them just because you feel better. You need to take the full course of medicine. This will help make sure your infection is cured. It will also help prevent the growth of antibiotic-resistant bacteria. Always take the exact amount that the label says to take. If the label says to take the medicine at a certain time, follow those directions. You might feel better after you take an antibiotic for a few days. But it is important to keep taking it for as long as prescribed. That will help you get rid of those bacteria that are a bit stronger and that survive the first few days of treatment. Where can you learn more? Go to https://[email or web address].[employer]. org and sign in to your [employer] account. Enter D616 in the [employer] box to learn more about \"Learning About the Safe Use of Antibiotics. \"     If you do not have an account, please click on the \"Sign Up Now\" link. Current as of: March 3, 2017  Content Version: 11.3  © 8744-1951 [employer] Care instructions adapted under license by [facility] ([facility]). If you have questions about a medical condition or this instruction, always ask your healthcare professional. [place] [address] any warranty or liability for your use of this information. Antibiotics are powerful drugs that are generally safe and very helpful in fighting disease, but there are times when antibiotics can actually be harmful. Antibiotics can have side effects, including allergic reactions and a potentially deadly diarrhea caused by the bacteria Clostridium difficile (C. diff). Antibiotics can also interfere with the action of other drugs a patient may be taking for another condition. These unintended reactions to antibiotics are called adverse drug events.    When someone takes an antibiotic that they do not need, they are needlessly exposed to the side effects of the drug and do not get any benefit from it. Moreover, taking an antibiotic when it is not needed can lead to the development of antibiotic resistance. When resistance develops, antibiotics may not be able to stop future infections. Every time someone takes an antibiotic they dont need, they increase their risk of developing a resistant infection in the future. Types of Adverse Drug Events Related to Antibiotics  Allergic Reactions  Every year, there are more than 140,000 emergency department visits for reactions to antibiotics. Almost four out of five (79%) emergency department visits for antibiotic-related adverse drug events are due to an allergic reaction. These reactions can range from mild rashes and itching to serious blistering skin reactions swelling of the face and throat, and breathing problems. Minimizing unnecessary antibiotic use is the best way to reduce the risk of adverse drug events from antibiotics. Patients should tell their doctors about any past drug reactions or allergies. C. difficile  C. difficile causes diarrhea linked to at least 14,000 [place] deaths each year. When a person takes antibiotics, good bacteria that protect against infection are destroyed for several months. During this time, patients can get sick from C. difficile picked up from contaminated surfaces or spread from a healthcare providers hands. Those most at risk are people, especially older adults, who take antibiotics and also get medical care. Take antibiotics exactly and only as prescribed. Drug Interactions and Side Effects  Antibiotics can interact with other drugs patients take, making those drugs or the antibiotics less effective. Some drug combinations can worsen the side effects of the antibiotic or other drug. Common side effects of antibiotics include nausea, diarrhea, and stomach pain. Sometimes these symptoms can lead to dehydration and other problems.  Patients should ask their doctors about drug interactions and the potential side effects of antibiotics.  The doctor should be told immediately if a patient has any side effects from antibiotics  Page last updated: February 24, 2017 Content source:   Centers [employer] Disease Control and [employer] for Emerging and Zoonotic Infectious Diseases (Courtney Martines)  Division of Healthcare Quality Promotion [facility], [facility]

## 2021-02-18 NOTE — PLAN OF CARE
Problem: Pain:  Goal: Pain level will decrease  Description: Pain level will decrease  Outcome: Ongoing  Goal: Control of acute pain  Description: Control of acute pain  Outcome: Ongoing  Goal: Control of chronic pain  Description: Control of chronic pain  Outcome: Ongoing     Problem: Fluid Volume:  Goal: Signs and symptoms of dehydration will decrease  Description: Signs and symptoms of dehydration will decrease  Outcome: Ongoing  Goal: Ability to achieve a balanced intake and output will improve  Description: Ability to achieve a balanced intake and output will improve  Outcome: Ongoing  Goal: Diagnostic test results will improve  Description: Diagnostic test results will improve  Outcome: Ongoing     Problem: Physical Regulation:  Goal: Complications related to the disease process, condition or treatment will be avoided or minimized  Description: Complications related to the disease process, condition or treatment will be avoided or minimized  Outcome: Ongoing  Goal: Ability to maintain vital signs within normal range will improve  Description: Ability to maintain vital signs within normal range will improve  Outcome: Ongoing

## 2021-02-18 NOTE — PROGRESS NOTES
Transitions of Care Pharmacy Service   Medication Review    The patient's list of current home medications has been reviewed and updated. Source(s) of information: Patient/Sure Scripts    Please feel free to call with any questions about this encounter. Thank you. Maria Esther Lim, [facility]  Transitions of Care Pharmacy Service  Phone:  [phone]  Fax: [phone]      Prior to Admission medications    Medication Sig Start Date End Date Taking?  Authorizing Provider   diphenhydrAMINE (BENADRYL) 25 MG tablet Take 25 mg by mouth nightly as needed for Allergies or Sleep   Yes Historical Provider, MD   Probiotic Product (PROBIOTIC ADVANCED PO) Take 1 capsule by mouth daily   Yes Historical Provider, MD   SUMAtriptan (IMITREX) 50 MG tablet Take 1 tablet by mouth once as needed for Migraine (may repeat dose in 1 hour if not effective) 2/2/21 2/18/21 Yes MARINA Camp - CNP

## 2021-02-18 NOTE — PROGRESS NOTES
[facility]    Gastroenterology Daily Progress Note      Patient:   Mike Molina   :    1985   Facility:   [facility]  Date:     2021  Consultant:   Jeff Beckwith CNP      SUBJECTIVE  30 y.o. female admitted 2021 with Abdominal pain [R10.9] and seen for abdominal pain with diarrhea. The pt was seen and examined. She continues to have lower abdominal cramping when having a BM. Stool is loose and bloody 3 episodes today. c diff testing is pending- it was sent to [facility] testing,  stool culture is negative. Leucocytosis is resolved.          OBJECTIVE  Scheduled Meds:   sodium chloride flush  10 mL Intravenous 2 times per day    famotidine (PEPCID) injection  20 mg Intravenous BID    enoxaparin  40 mg Subcutaneous Daily    pantoprazole  40 mg Oral QAM AC    metroNIDAZOLE  500 mg Intravenous Q8H    vancomycin  125 mg Oral 4 times per day       Vital Signs:  BP (!) 139/91   Pulse 94   Temp 97.9 °F (36.6 °C) (Oral)   Resp 16   Ht 5' 3\" (1.6 m)   Wt 163 lb 12.8 oz (74.3 kg)   LMP  (LMP Unknown)   SpO2 100%   BMI 29.02 kg/m²      Physical Exam:     General Appearance: alert and oriented to person, place and time, well-developed and well-nourished, in no acute distress  Skin: warm and dry, no rash or erythema  Head: normocephalic and atraumatic  Eyes: pupils equal, round, and reactive to light, extraocular eye movements intact, conjunctivae normal  ENT: hearing grossly normal bilaterally  Neck: neck supple and non tender without mass, no thyromegaly or thyroid nodules, no cervical lymphadenopathy   Pulmonary/Chest: clear to auscultation bilaterally- no wheezes, rales or rhonchi, normal air movement, no respiratory distress  Cardiovascular: normal rate, regular rhythm, normal S1 and S2, no murmurs, rubs, clicks or gallops, distal pulses intact, no carotid bruits  Abdomen: soft, obese non-tender, non-distended, normal bowel sounds, no masses or organomegaly  Extremities: no cyanosis, clubbing or edema  Musculoskeletal: normal range of motion, no joint swelling, deformity or tenderness  Neurologic: no cranial nerve deficit and muscle strength normal    Lab and Imaging Review     CBC  Recent Labs     02/17/21  1305 02/18/21  0552   WBC 17.9* 9.5   HGB 15.3* 12.4   HCT 48.9* 39.0   MCV 92.6 91.3    276       BMP  Recent Labs     02/17/21  1305 02/18/21  0552    138   K 4.2 4.0    109*   CO2 18* 19*   BUN 8 5*   CREATININE 0.60 0.56   GLUCOSE 93 90   CALCIUM 10.3 8.3*       LFTS  Recent Labs     02/17/21  1305 02/18/21  0552   ALKPHOS 60 44   ALT 17 11   AST 20 12   PROT 8.9* 6.8   BILITOT 0.39 0.29*   BILIDIR 0.11  --    LABALBU 5.2 3.9       AMYLASE/LIPASE/AMMONIA  Recent Labs     02/17/21  1305 02/18/21  0552   AMYLASE  --  67   LIPASE 28 19     FINDINGS:ct abd 2/16/21   CT Abdomen:  Heart size is normal.  The visualized lung bases are clear. The   liver, spleen, pancreas, gallbladder, kidneys, and adrenal glands appear   normal.       The stomach and the small and large bowel loops are normal in contour,   caliber and morphology, without acute or significant abnormality.  No dilated   loops or areas of bowel wall thickening.  The appendix is normal.  There is no   free fluid or extraluminal gas.  No enlarged or suspicious mesenteric or   retroperitoneal lymphadenopathy.  The abdominal aorta and iliac arteries are   patent and of normal caliber.  No significant osseous abnormality.       CT Pelvis: No enlarged or suspicious pelvic or inguinal lymphadenopathy.  A   small right ovarian follicles present, likely physiologic, and there is trace   pelvic free fluid, likely also physiologic.  No appreciable uterine or left   adnexal abnormality.  The urinary bladder and the pelvic bowel loops and   osseous structures are unremarkable.           Impression   Unremarkable contrast-enhanced CT examination of the abdomen and pelvis,   without finding to account for the patient's symptoms.               ASSESSMENT/PLAN:  1. Abdominal pain with bloody diarrhea, diarrhea and pain are improved but not resolved. Stool cultures are negative, stool was re sent to [facility] today for c diff testing that is still pending-the result in the computer currently was mucus not stool  -outpt  Colonoscopy to r/o microscopic colitis and autoimmune process  -diet as tolerated  - discussed plan of care with  md jean to send home with tapered dose of oral vanco only  Vancomycin 125mg four times a day for 14 days then 125mg twice a day for 7 days then 125mg daily for 7 days then 125mg every other day for 8 days then 125mg every third day for 15 days. This plan was formulated in collaboration with Dr. Nabeel Maxwell. Electronically signed by: MARINA Rodriguez CNP on 2/18/2021 at 12:03 PM     Attending Physician Statement  I have discussed the care of Rashel Boyer and   I have examined the patient myselft independently, and taken ros and hpi , including pertinent history and exam findings,  with the author of this note . I have reviewed the key elements of all parts of the encounter with the nurse practitioner/resident.     I agree with the assessment, plan and orders as documented by the above health care provider       Patient much improved  No abdominal pain or burning sensation  White blood count normalized  Culture of the stool is been negative so far  C. difficile was not a good test for which he was repeated still pending but the patient wants to go home  We will send home on extended vancomycin taper dose  And follow as an outpatient will take it from there  Electronically signed by River Schwarz MD

## 2021-02-18 NOTE — PLAN OF CARE
Problem: Pain:  Goal: Pain level will decrease  Description: Pain level will decrease  2/18/2021 1216 by Shanthi Dyer RN  Outcome: Ongoing     Problem: Pain:  Goal: Control of acute pain  Description: Control of acute pain  2/18/2021 1216 by Shanthi Dyer RN  Outcome: Ongoing     Problem: Pain:  Goal: Control of chronic pain  Description: Control of chronic pain  2/18/2021 1216 by Shanthi Dyer RN  Outcome: Ongoing     Problem: Fluid Volume:  Goal: Signs and symptoms of dehydration will decrease  Description: Signs and symptoms of dehydration will decrease  2/18/2021 1216 by Shanthi Dyer RN  Outcome: Ongoing     Problem: Fluid Volume:  Goal: Ability to achieve a balanced intake and output will improve  Description: Ability to achieve a balanced intake and output will improve  2/18/2021 1216 by Shanthi Dyer RN  Outcome: Ongoing     Problem: Fluid Volume:  Goal: Diagnostic test results will improve  Description: Diagnostic test results will improve  2/18/2021 1216 by Shanthi Dyer RN  Outcome: Ongoing     Problem: Physical Regulation:  Goal: Complications related to the disease process, condition or treatment will be avoided or minimized  Description: Complications related to the disease process, condition or treatment will be avoided or minimized  2/18/2021 1216 by Shanthi Dyer RN  Outcome: Ongoing     Problem: Physical Regulation:  Goal: Ability to maintain vital signs within normal range will improve  Description: Ability to maintain vital signs within normal range will improve  2/18/2021 1216 by Shanthi Dyer RN  Outcome: Ongoing

## 2021-02-18 NOTE — CONSULTS
Gastroenterology Consult Note      Patient: Sandra Holguin  : 1985  Acct#:  [de-identified]     Date:  2021    Subjective:       History of Present Illness  Patient is a 30 y.o.  female admitted with Abdominal pain [R10.9] who is seen in consult for diarrhea  Pleasant young lady  Been having issues with C. difficile since the   She was treated with antibiotics including Flagyl for 10 days and she took only 3 days of p.o. Vanco, diarrhea has resolved for 3 4 days and came back  She has not been on anything since   Now she is having the symptoms again with 5-6 episodes of diarrhea per day  She reports fever at home  She does have some blood-tinged stool in mucus. She had a CAT scan yesterday which was negative for colitis  But her white count today 17.9  Hemoglobin is 15.3  Having a lot of pain  She lost 10 pounds in the last month. She was Covid negative few days ago  She denied any other GI symptoms  No previous history of colitis            Past Medical History:   Diagnosis Date    Acute anxiety       Past Surgical History:   Procedure Laterality Date     SECTION       SECTION N/A 2019     SECTION performed by Kaila Acuna MD at [facility] [address]      r fallopian tube gone    UMBILICAL HERNIA REPAIR N/A 878    HERNIA UMBILICAL REPAIR performed by Rajesh Michael MD at [address] [place] [address]        Past Endoscopic History none    Admission Meds  No current facility-administered medications on file prior to encounter.       Current Outpatient Medications on File Prior to Encounter   Medication Sig Dispense Refill    SUMAtriptan (IMITREX) 50 MG tablet Take 1 tablet by mouth once as needed for Migraine (may repeat dose in 1 hour if not effective) 9 tablet 0    buPROPion HCl (WELLBUTRIN PO) Take by mouth         Patient   Does Use ASA, NSAID No  Allergies  Allergies Allergen Reactions    Tape [place] Tape] Swelling     Surgical tape        Social   Social History     Tobacco Use    Smoking status: Former Smoker    Smokeless tobacco: Never Used   Substance Use Topics    Alcohol use: Yes     Frequency: 2-4 times a month     Comment: soc        PSYCH HISTORY:  Depression No  Anxiety No  Suicide No       Family History   Problem Relation Age of Onset    Rheum Arthritis Father     Seizures Brother     Stroke Brother     Mental Illness Mother         anxiety and depression      No family history of colon cancer, Crohn's disease, or ulcerative colitis. Review of Systems  Constitutional: negative  Eyes: negative  Ears, nose, mouth, throat, and face: negative  Respiratory: negative  Cardiovascular: negative  Gastrointestinal: negative  Genitourinary:negative  Integument/breast: negative  Hematologic/lymphatic: negative  Musculoskeletal:negative  Endocrine: negative           Physical Exam  Blood pressure (!) 139/91, pulse 94, temperature 97.9 °F (36.6 °C), temperature source Oral, resp. rate 16, height 5' 3\" (1.6 m), weight 163 lb 12.8 oz (74.3 kg), SpO2 100 %, currently breastfeeding.          General Appearance: alert and oriented to person, place and time, well-developed and well-nourished, in no acute distress  Skin: warm and dry, no rash or erythema  Head: normocephalic and atraumatic  Eyes: pupils equal, round, and reactive to light, extraocular eye movements intact, conjunctivae normal  ENT: hearing grossly normal bilaterally  Neck: neck supple and non tender without mass, no thyromegaly or thyroid nodules, no cervical lymphadenopathy   Pulmonary/Chest: clear to auscultation bilaterally- no wheezes, rales or rhonchi, normal air movement, no respiratory distress  Cardiovascular: normal rate, regular rhythm, normal S1 and S2, no murmurs, rubs, clicks or gallops, distal pulses intact, no carotid bruits  Abdomen: soft, non-tender, non-distended, normal bowel sounds, no masses or organomegaly  Extremities: no cyanosis, clubbing or edema  Musculoskeletal: normal range of motion, no joint swelling, deformity or tenderness  Neurologic: no cranial nerve deficit and muscle strength normal    Data Review:    Recent Labs     02/17/21  1305 02/18/21  0552   WBC 17.9* 9.5   HGB 15.3* 12.4   HCT 48.9* 39.0   MCV 92.6 91.3    276     Recent Labs     02/17/21  1305 02/18/21  0552    138   K 4.2 4.0    109*   CO2 18* 19*   PHOS  --  2.8   BUN 8 5*   CREATININE 0.60 0.56     Recent Labs     02/17/21  1305 02/18/21  0552   AST 20 12   ALT 17 11   BILIDIR 0.11  --    BILITOT 0.39 0.29*   ALKPHOS 60 44     Recent Labs     02/17/21  1305 02/18/21  0552   LIPASE 28 19   AMYLASE  --  67     No results for input(s): PROTIME, INR in the last 72 hours. No results for input(s): PTT in the last 72 hours. No results for input(s): OCCULTBLD in the last 72 hours. CEA:  No results found for: CEA  Ca 125:  No results found for:   Ca 19-9:  No results found for:   Ca 15-3:  No results found for:   AFP:  No components found for: AFAFP  Beta HCG:  No components found for: BHCG  Neuron Specific Enolase:  No results found for: NSE  Imaging Studies:                           All appropriate imaging studies and reports reviewed: Yes                 Assessment:     Principal Problem:    C. difficile colitis  Active Problems:    BMI 32.3    Abdominal pain  Resolved Problems:    * No resolved hospital problems. *    Diarrhea  Fever  Abdominal pain  Leukocytosis    Recommendations:   Questionable undertreated C. Difficile  We will admit the patient  Start on IV Flagyl and p.o. Vanco  KUB  Stool studies  Rehydrate                        Thank you for allowing me to participate in the care of your patient. Please feel free to contact me with any questions or concerns.      Katharina Rojas MD

## 2021-02-18 NOTE — PROGRESS NOTES
[facility]  Office: [address], DO, Zackary Reginatis, DO, Alexandra Dania, DO, Zay Freeman Blood, DO, Ki Goldberg MD, Eloina Neri MD, Layne Mendez MD, Deangelo Carrero MD, Jacoby Inman MD, Markie James MD, Haley Coreas MD, Chacorta Nava MD, Kate Amanda MD, Anneliese Parekh, DO, Victor Hugo Gordon MD, Lyla Castleman, MD, Aldair Nagy DO, Vandana Lanza MD,  Bonnie Christianson DO, Chanell Her MD, Summer Blunt MD, Lucinda Moreira, [facility], [facility], CNP, Itz Rodriguez, CNP, Yoon Calloway, CNS, Gaby Lucas, [facility], Jaxon Julian, [facility], Román Diaz, CNP, Davonte Yee, CNP, Nick Walker, CNP, Saira Topete PA-C, [place], [facility], Milton Coombs, CNP, Minal Rivera, CNP, Yandel Paris, CNP, Nataly De Los Santos, CNP, Trini Stauffer, [facility]    Progress Note    2/18/2021    11:11 AM    Name:   Mike Molina  MRN:     [id number]     Acct:      [de-identified]   Room:   2006/2006-02  IP Day:  0  Admit Date:  2/17/2021 12:08 PM    PCP:   MARINA Casey CNP  Code Status:  Full Code    Subjective:     C/C:   Chief Complaint   Patient presents with    Abdominal Pain    Fever    Diarrhea     5 weeks     Interval History Status: improved. Mild improvement in condition. Patient reports abdominal cramps during bowel movements. Patient reports that her diarrheal episodes are reducing in frequency however they remain persistent. GI is recommended continued IV antibiotics. Patient is maintained on oral vancomycin. Patient is anxious to be discharged. Patient's white count has returned to baseline with hydration, leukocytosis was likely reactive. Cultures, lactate, pro-Dario pending    Brief History:     2/17 -Patient was reportedly treated for a sinus infection approximately 5 weeks ago with a course of antibiotics.   After that time the patient developed infectious diarrhea which was later diagnosed as C. difficile. Patient was initially started on oral Flagyl and had a poor response to this and patient was transitioned to oral vancomycin but for only 3 days. Patient completed this course approximately 2 weeks ago. Patient reported that for the 2 weeks preceding today's visit her symptoms wax and wane and she thought she was getting better. Over the past 48 hours the patient has developed severe lower abdominal cramps with bloody diarrhea. Patient states that she is having bowel movements approximately 5-7 times per day. GI was consulted out of the emergency department and they have requested the patient be admitted to medicine services for IV antibiotics and initiation of a longer course of oral vancomycin.     2/18 -mild improvement. Patient maintained on oral vancomycin and IV Flagyl. Review of Systems:     Constitutional:  negative for chills, fevers, sweats  Respiratory:  negative for cough, dyspnea on exertion, shortness of breath, wheezing  Cardiovascular:  negative for chest pain, chest pressure/discomfort, lower extremity edema, palpitations  Gastrointestinal:  Positive for abdominal distention, abdominal pain, anal bleeding, blood in stool, diarrhea and rectal pain. Negative for nausea and vomiting. Neurological:  negative for dizziness, headache    Medications: Allergies:     Allergies   Allergen Reactions    Tape Jil Panning Tape] Swelling     Surgical tape       Current Meds:   Scheduled Meds:    sodium chloride flush  10 mL Intravenous 2 times per day    famotidine (PEPCID) injection  20 mg Intravenous BID    enoxaparin  40 mg Subcutaneous Daily    pantoprazole  40 mg Oral QAM AC    metroNIDAZOLE  500 mg Intravenous Q8H    vancomycin  125 mg Oral 4 times per day     Continuous Infusions:    sodium chloride 125 mL/hr at 02/18/21 0928     PRN Meds: sodium chloride flush, potassium chloride **OR** potassium alternative oral replacement **OR** potassium chloride, magnesium sulfate, nicotine, promethazine **OR** ondansetron, acetaminophen **OR** acetaminophen, polyethylene glycol, HYDROcodone 5 mg - acetaminophen    Data:     Past Medical History:   has a past medical history of Acute anxiety. Social History:   reports that she has quit smoking. She has never used smokeless tobacco. She reports current alcohol use. She reports current drug use. Drug: Marijuana. Family History:   Family History   Problem Relation Age of Onset    Rheum Arthritis Father     Seizures Brother     Stroke Brother     Mental Illness Mother         anxiety and depression       Vitals:  /80   Pulse 96   Temp 98.1 °F (36.7 °C) (Oral)   Resp 16   Ht 5' 3\" (1.6 m)   Wt 163 lb 12.8 oz (74.3 kg)   LMP  (LMP Unknown)   SpO2 100%   BMI 29.02 kg/m²   Temp (24hrs), Av.8 °F (36.6 °C), Min:97.3 °F (36.3 °C), Max:98.1 °F (36.7 °C)    No results for input(s): POCGLU in the last 72 hours. I/O (24Hr):     Intake/Output Summary (Last 24 hours) at 2021 1111  Last data filed at 2021 0928  Gross per 24 hour   Intake 1921 ml   Output --   Net 1921 ml       Labs:  Hematology:  Recent Labs     21  1305 21  0552   WBC 17.9* 9.5   RBC 5.28* 4.27   HGB 15.3* 12.4   HCT 48.9* 39.0   MCV 92.6 91.3   MCH 29.0 29.0   MCHC 31.3 31.8   RDW 13.4 13.4    276   MPV 8.5 8.7     Chemistry:  Recent Labs     21  1305 21  0552    138   K 4.2 4.0    109*   CO2 18* 19*   GLUCOSE 93 90   BUN 8 5*   CREATININE 0.60 0.56   MG  --  1.9   ANIONGAP 18* 10   LABGLOM >60 >60   GFRAA >60 >60   CALCIUM 10.3 8.3*   PHOS  --  2.8     Recent Labs     21  1305 21  0552   PROT 8.9* 6.8   LABALBU 5.2 3.9   AST 20 12   ALT 17 11   ALKPHOS 60 44   BILITOT 0.39 0.29*   BILIDIR 0.11  --    AMYLASE  --  67   LIPASE 28 19     ABG:No results found for: POCPH, PHART, PH, POCPCO2, AND8XFB, PCO2, POCPO2, PO2ART, PO2, POCHCO3, PLF8XOQ, HCO3, NBEA, PBEA, BEART, BE, THGBART, THB, BHY4LOD, ZSQU4AKW, Y3STIPDI, O2SAT, FIO2  Lab Results   Component Value Date/Time    SPECIAL NOT REPORTED 02/17/2021 03:15 PM     Lab Results   Component Value Date/Time    CULTURE NEGATIVE FOR GROUP B STREPTOCOCCI 03/13/2019 03:07 PM       Radiology:  Preet Marv Abdomen (kub) (single Ap View)    Result Date: 2/17/2021  No radiopaque urinary collecting system calculus evident. Unremarkable bowel gas pattern. Ct Abdomen Pelvis W Iv Contrast Additional Contrast? None    Result Date: 2/16/2021  Unremarkable contrast-enhanced CT examination of the abdomen and pelvis, without finding to account for the patient's symptoms. Physical Examination:        General appearance:  alert, cooperative and no distress  Mental Status:  oriented to person, place and time and normal affect  Lungs:  clear to auscultation bilaterally, normal effort  Heart:  regular rate and rhythm, no murmur  Abdomen:  soft, nondistended, normal bowel sounds, no masses, hepatomegaly, splenomegaly. There is abdominal tenderness  Extremities:  no edema, redness, tenderness in the calves  Skin:  no gross lesions, rashes, induration    Assessment:        Hospital Problems           Last Modified POA    * (Principal) C. difficile colitis 2/17/2021 Yes    BMI 32.3 2/17/2021 Yes    Abdominal pain 2/17/2021 Yes          Plan:        1. Continue IV antibiotics per GI recommendations  2. Continue p.o. vancomycin  3. White count returned to baseline with hydration, likely reactive  4.  Repeat C. difficile stool study, likely poor specimen on yesterday's evaluation      MARINA Hutchins NP  2/18/2021  11:11 AM

## 2021-02-18 NOTE — CARE COORDINATION
Case Management Initial Discharge Plan  Cal Davison,             Met with:patient to discuss discharge plans. Information verified: address, contacts, phone number, , insurance Yes    Emergency Contact/Next of Kin name & number: spouse/Tanner    [phone]    PCP: MARINA Bender CNP  Date of last visit: 1 month ago    Insurance Provider: MMO    Discharge Planning    Living Arrangements:  Spouse/Significant Other   Support Systems:  Spouse/Significant Other, Parent    Home has 2 stories  2 stairs to climb to get into front door, 1 flight stairs to climb to reach second floor  Location of bedroom/bathroom in home 2nd floor    Patient able to perform ADL's:Independent    Current Services (outpatient & in home) none  DME equipment: 0  DME provider: 0    Receiving oral anticoagulation therapy? No    If indicated:   Physician managing anticoagulation treatment:   Where does patient obtain lab work for ATC treatment? Potential Assistance Needed:  N/A    Patient agreeable to home care: No  [place] of choice provided:  n/a    Prior SNF/Rehab Placement and Facility: n/a  Agreeable to SNF/Rehab: No  [place] of choice provided: n/a     Evaluation: no    Expected Discharge date:  21    Patient expects to be discharged to:  home  Follow Up Appointment: Best Day/ Time:      Transportation provider: spouse  Transportation arrangements needed for discharge: No    Readmission Risk              Risk of Unplanned Readmission:        0             Does patient have a readmission risk score greater than 14?: No  If yes, follow-up appointment must be made within 7 days of discharge. Goals of Care:       Discharge Plan: Dg; Abdominal pain  Patient is independent  Declines any needs  Going to use meds to beds  Has transportation  Continue to follow for any needs.            Electronically signed by Janeth William RN on 21 at 4:30 PM EST

## 2021-02-18 NOTE — DISCHARGE SUMMARY
[facility]  Office: [address], DO, Ty Michaud, DO, Kernsjasmyn Ahn, DO, Won Johnson Blood, DO, Winnie Galaviz MD, Echo Sullivan MD, Francesca Barnes MD, Maximiliano Jurado MD, Dean Mcwilliams MD, Rajendra Dowling MD, Colton Nolan MD, Desmond Jaeger MD, Kate Wisdom MD, Eleuterio Calvin DO, Samantha Gomes MD, Cammy Kenney MD, Braulio Tamayo DO, Jamie Spence MD,  Destiny Galdamez DO, Dennie Skinner, MD, Tara Phan MD, Diana [facility], [facility], CNP, Kieran Hill, CNP, Shazia Hoyt, CNS, Rico Santos, CNP, Devendra Otero, CNP, Janeth García, CNP, Hernesto Koehler, CNP, Brice Huffman, CNP, Sushila Ovalle PA-C, Nicki Elmore, [facility], Shilpa Second, CNP, Nelson Cord, CNP, Live Contras, CNP, Tam East, CNP, Conchita Meth, [address]    Discharge Summary     Patient ID: Sanket Hernandez  :  1985   MRN: [id number]     ACCOUNT:  [de-identified]   Patient's PCP: MARINA Liriano CNP  Admit Date: 2021   Discharge Date: 2021     Length of Stay: 0  Code Status:  Full Code  Admitting Physician: Miley Link MD  Discharge Physician: MARINA Justin NP     Active Discharge Diagnoses:     Hospital Problem Lists:  Principal Problem:    C. difficile colitis  Active Problems:    BMI 32.3    Abdominal pain    Diarrhea  Resolved Problems:    * No resolved hospital problems.  *      Admission Condition:  fair     Discharged Condition: good    Hospital Stay:     Hospital Course:  Sanket Hernandez is a [address] y.o. female who was admitted for the management of  C. difficile colitis , presented to ER with Abdominal Pain, Fever, and Diarrhea (5 weeks)     -Patient was reportedly treated for a sinus infection approximately 5 weeks ago with a course of antibiotics.  After that time the patient developed infectious diarrhea which was later diagnosed as C. difficile.  Patient was initially started on oral Flagyl and had a poor response to this and patient was transitioned to oral vancomycin but for only 3 days.  Patient completed this course approximately 2 weeks ago. Geraldine Duran reported that for the 2 weeks preceding today's visit her symptoms wax and wane and she thought she was getting better.  Over the past 48 hours the patient has developed severe lower abdominal cramps with bloody diarrhea.  Patient states that she is having bowel movements approximately 5-7 times per day. [place] [facility] was consulted out of the emergency department and they have requested the patient be admitted to medicine services for IV antibiotics and initiation of a longer course of oral vancomycin.     2/18 -mild improvement. Patient maintained on oral vancomycin and IV Flagyl. GI recommends out pt treatment. Pt discharged.        Significant therapeutic interventions: Oral vanco    Significant Diagnostic Studies:   Labs / Micro:  CBC:   Lab Results   Component Value Date    WBC 9.5 02/18/2021    RBC 4.27 02/18/2021    HGB 12.4 02/18/2021    HCT 39.0 02/18/2021    MCV 91.3 02/18/2021    MCH 29.0 02/18/2021    MCHC 31.8 02/18/2021    RDW 13.4 02/18/2021     02/18/2021     BMP:    Lab Results   Component Value Date    GLUCOSE 90 02/18/2021     02/18/2021    K 4.0 02/18/2021     02/18/2021    CO2 19 02/18/2021    ANIONGAP 10 02/18/2021    BUN 5 02/18/2021    CREATININE 0.56 02/18/2021    BUNCRER 9 02/18/2021    CALCIUM 8.3 02/18/2021    LABGLOM >60 02/18/2021    GFRAA >60 02/18/2021    GFR      02/18/2021    GFR NOT REPORTED 02/18/2021     U/A:    Lab Results   Component Value Date    COLORU YELLOW 01/10/2019    TURBIDITY CLEAR 01/10/2019    SPECGRAV 1.003 01/10/2019    HGBUR NEGATIVE 01/10/2019    PHUR 6.0 01/10/2019    PROTEINU NEGATIVE 01/10/2019    GLUCOSEU NEGATIVE 01/10/2019    KETUA NEGATIVE 01/10/2019    BILIRUBINUR NEGATIVE 01/10/2019    UROBILINOGEN Normal 01/10/2019    NITRU NEGATIVE 01/10/2019    LEUKOCYTESUR NEGATIVE 01/10/2019        Radiology:  Xr Abdomen (kub) (single Ap View)    Result Date: 2/17/2021  No radiopaque urinary collecting system calculus evident. Unremarkable bowel gas pattern. Ct Abdomen Pelvis W Iv Contrast Additional Contrast? None    Result Date: 2/16/2021  Unremarkable contrast-enhanced CT examination of the abdomen and pelvis, without finding to account for the patient's symptoms. Consultations:    Consults:     Final Specialist Recommendations/Findings:   IP CONSULT TO GI  IP CONSULT TO HOSPITALIST  IP CONSULT TO GI      The patient was seen and examined on day of discharge and this discharge summary is in conjunction with any daily progress note from day of discharge. Discharge plan:     Disposition: Home    Physician Follow Up:     Sarah Awad, APRN - [facility]  [address] [place] [address]  [phone]    Schedule an appointment as soon as possible for a visit  As needed, Follow-up    MD Gagandeep White [address], [facility]  [address] [place] [address]  [phone]    Schedule an appointment as soon as possible for a visit in 1 week  Follow-up       Requiring Further Evaluation/Follow Up POST HOSPITALIZATION/Incidental Findings: none    Diet: low fat, low cholesterol diet    Activity: As tolerated    Instructions to Patient: take meds a ordered    Discharge Medications:      Medication List      START taking these medications    vancomycin 25 mg/ml oral solution  Commonly known as: VANCOCIN  Take 5 mLs by mouth every 6 hours for 14 days, THEN 5 mLs every 12 hours for 7 days, THEN 5 mLs daily for 7 days, THEN 5 mLs every other day for 8 days, THEN 5 mLs three times a week for 15 days.   Start taking on: February 18, 2021        CONTINUE taking these medications    diphenhydrAMINE 25 MG tablet  Commonly known as: BENADRYL     PROBIOTIC ADVANCED PO     SUMAtriptan 50 MG tablet  Commonly known as: IMITREX  Take 1 tablet by mouth once as needed for Migraine (may repeat dose in 1 hour if not effective)        STOP taking these medications    WELLBUTRIN PO           Where to Get Your Medications      You can get these medications from any pharmacy    Bring a paper prescription for each of these medications  · vancomycin 25 mg/ml oral solution         No discharge procedures on file. Time Spent on discharge is  39 mins in patient examination, evaluation, counseling as well as medication reconciliation, prescriptions for required medications, discharge plan and follow up. Electronically signed by   MARINA Mir NP  2/18/2021  6:31 PM      Thank you MARINA Baisn CNP for the opportunity to be involved in this patient's care.

## 2021-02-19 LAB
C DIFFICILE TOXINS, PCR: NORMAL
SPECIMEN DESCRIPTION: NORMAL

## 2021-02-19 NOTE — PROGRESS NOTES
Pt's pharmacy not open at time of discharge. Vanco script not able to be called in. Dr. Melody Grace at bedside with patient. Pt stated she has PO Vanco tabs at home that she can take until the pharmacy opens. Dr. Melody Grace OK with sending her home with the  PO Vanco she already has. Dr. Melody Grace left the unit and did not sign Vanco script. Writer spoke to Dr. Melody Grace, and he will sign the script tmw during rounds. Pt aware she needs to return to get the [employer].

## 2021-02-20 LAB
FAT QUALITATIVE SPLIT STOOL: NORMAL
FECAL NEUTRAL FAT: NORMAL

## 2021-02-24 LAB
CULTURE: NORMAL
Lab: NORMAL
SPECIMEN DESCRIPTION: NORMAL

## 2021-03-08 ENCOUNTER — OFFICE VISIT (OUTPATIENT)
Dept: GASTROENTEROLOGY | Age: 36
End: 2021-03-08
Payer: COMMERCIAL

## 2021-03-08 ENCOUNTER — HOSPITAL ENCOUNTER (OUTPATIENT)
Age: 36
Discharge: HOME | End: 2021-03-08
Payer: COMMERCIAL

## 2021-03-08 VITALS
SYSTOLIC BLOOD PRESSURE: 120 MMHG | RESPIRATION RATE: 16 BRPM | HEIGHT: 63 IN | TEMPERATURE: 98.3 F | HEART RATE: 88 BPM | WEIGHT: 163 LBS | DIASTOLIC BLOOD PRESSURE: 88 MMHG | BODY MASS INDEX: 28.88 KG/M2

## 2021-03-08 DIAGNOSIS — R19.5 LOOSE STOOLS: ICD-10-CM

## 2021-03-08 DIAGNOSIS — K52.9 COLITIS: Primary | ICD-10-CM

## 2021-03-08 DIAGNOSIS — K52.9 COLITIS: ICD-10-CM

## 2021-03-08 LAB
THYROXINE, FREE: 1.12 NG/DL (ref 0.93–1.7)
TSH SERPL DL<=0.05 MIU/L-ACNC: 1.24 MIU/L (ref 0.3–5)

## 2021-03-08 PROCEDURE — 84443 ASSAY THYROID STIM HORMONE: CPT

## 2021-03-08 PROCEDURE — 82784 ASSAY IGA/IGD/IGG/IGM EACH: CPT

## 2021-03-08 PROCEDURE — 83516 IMMUNOASSAY NONANTIBODY: CPT

## 2021-03-08 PROCEDURE — 84439 ASSAY OF FREE THYROXINE: CPT

## 2021-03-08 PROCEDURE — 36415 COLL VENOUS BLD VENIPUNCTURE: CPT

## 2021-03-08 PROCEDURE — 99214 OFFICE O/P EST MOD 30 MIN: CPT | Performed by: INTERNAL MEDICINE

## 2021-03-08 ASSESSMENT — ENCOUNTER SYMPTOMS
COUGH: 0
ANAL BLEEDING: 0
ABDOMINAL PAIN: 0
CONSTIPATION: 0
ABDOMINAL DISTENTION: 0
VOMITING: 0
TROUBLE SWALLOWING: 0
CHOKING: 0
NAUSEA: 0
BLOOD IN STOOL: 0
WHEEZING: 0
RECTAL PAIN: 0
DIARRHEA: 0

## 2021-03-08 NOTE — PROGRESS NOTES
GI CLINIC FOLLOW UP    INTERVAL HISTORY:   No referring provider defined for this encounter. Chief Complaint   Patient presents with    Abdominal Pain     Patient is f/u on hospital visit. She states over the past week, she has not had diarrhea or abd pain. She states her stool is loose instead of liquid and less frequent. She finished Vanco last week. HISTORY OF PRESENT ILLNESS: Jay Casey is a 30 y.o. female , referred for evaluation of*C. difficile, abdominal pain. Patient was seen in the hospital at [facility], she did have C. difficile diarrhea before her admission which was treated, but she came with the same symptoms and we admit her because she had leukocytosis and severe pain, she responded quickly with her treatment, but the C. difficile was negative we elected to continue with the same regimen because of the clinical improvement. She finished her antibiotics a week ago. She said she has no symptoms similar to what she had before nevertheless she still have 2-3 bowel movements pain less loose stool no blood no cramps no fever no chills no nausea no vomiting she is able to eat whatever she wants with no issues    Past Medical,Family, and Social History reviewed and does contribute to the patient presentingcondition. Patient's PMH/PSH,SH,PSYCH Hx, MEDs, ALLERGIES, and ROS were all reviewed and updated in the appropriate sections.     PAST MEDICAL HISTORY:  Past Medical History:   Diagnosis Date    Acute anxiety        Past Surgical History:   Procedure Laterality Date     SECTION       SECTION N/A 2019     SECTION performed by Kaila Acuna MD at [facility] [address]      r fallopian tube gone    UMBILICAL HERNIA REPAIR N/A     HERNIA UMBILICAL REPAIR performed by Rajesh Michael MD at [address]:    Current Outpatient Medications:   diphenhydrAMINE (BENADRYL) 25 MG tablet, Take 25 mg by mouth nightly as needed for Allergies or Sleep, Disp: , Rfl:     Probiotic Product (PROBIOTIC ADVANCED PO), Take 1 capsule by mouth daily, Disp: , Rfl:     SUMAtriptan (IMITREX) 50 MG tablet, Take 1 tablet by mouth once as needed for Migraine (may repeat dose in 1 hour if not effective), Disp: 9 tablet, Rfl: 0    ALLERGIES:   Allergies   Allergen Reactions    Tape [Adhesive Tape] Swelling     Surgical tape       FAMILY HISTORY:       Problem Relation Age of Onset    Rheum Arthritis Father     Seizures Brother     Stroke Brother     Mental Illness Mother         anxiety and depression         SOCIAL HISTORY:   Social History     Socioeconomic History    Marital status:      Spouse name: Not on file    Number of children: Not on file    Years of education: Not on file    Highest education level: Not on file   Occupational History    Not on file   Social Needs    Financial resource strain: Not on file    Food insecurity     Worry: Not on file     Inability: Not on file    Transportation needs     Medical: Not on file     Non-medical: Not on file   Tobacco Use    Smoking status: Former Smoker    Smokeless tobacco: Never Used   Substance and Sexual Activity    Alcohol use: Yes     Frequency: 2-4 times a month     Comment: soc    Drug use: Yes     Types: Marijuana    Sexual activity: Yes     Partners: Male   Lifestyle    Physical activity     Days per week: Not on file     Minutes per session: Not on file    Stress: Not on file   Relationships    Social connections     Talks on phone: Not on file     Gets together: Not on file     Attends Christian service: Not on file     Active member of club or organization: Not on file     Attends meetings of clubs or organizations: Not on file     Relationship status: Not on file    Intimate partner violence     Fear of current or ex partner: Not on file     Emotionally abused: Not on file Physically abused: Not on file     Forced sexual activity: Not on file   Other Topics Concern    Not on file   Social History Narrative    ** Merged History Encounter **            REVIEW OF SYSTEMS: A 12-point review of systemswas obtained and pertinent positives and negatives were enumerated above in the history of present illness. All other reviewed systems / symptoms were negative. Review of Systems   Constitutional: Negative for appetite change, fatigue and unexpected weight change. HENT: Negative for trouble swallowing. Respiratory: Negative for cough, choking and wheezing. Cardiovascular: Positive for chest pain. Negative for palpitations and leg swelling. Gastrointestinal: Negative for abdominal distention, abdominal pain, anal bleeding, blood in stool, constipation, diarrhea (loose), nausea, rectal pain and vomiting. Genitourinary: Negative for difficulty urinating. Allergic/Immunologic: Negative for environmental allergies and food allergies. Neurological: Negative for dizziness, weakness, light-headedness, numbness and headaches. Hematological: Bruises/bleeds easily. Psychiatric/Behavioral: Negative for sleep disturbance. The patient is nervous/anxious.             LABORATORY DATA: Reviewed  Lab Results   Component Value Date    WBC 9.5 02/18/2021    HGB 12.4 02/18/2021    HCT 39.0 02/18/2021    MCV 91.3 02/18/2021     02/18/2021     02/18/2021    K 4.0 02/18/2021     (H) 02/18/2021    CO2 19 (L) 02/18/2021    BUN 5 (L) 02/18/2021    CREATININE 0.56 02/18/2021    LABALBU 3.9 02/18/2021    BILITOT 0.29 (L) 02/18/2021    ALKPHOS 44 02/18/2021    AST 12 02/18/2021    ALT 11 02/18/2021         Lab Results   Component Value Date    RBC 4.27 02/18/2021    HGB 12.4 02/18/2021    MCV 91.3 02/18/2021    MCH 29.0 02/18/2021    MCHC 31.8 02/18/2021    RDW 13.4 02/18/2021    MPV 8.7 02/18/2021    BASOPCT 1 02/18/2021    LYMPHSABS 1.98 02/18/2021    MONOSABS 0.52 02/18/2021 NEUTROABS 6.80 2021    EOSABS 0.17 2021    BASOSABS 0.05 2021         DIAGNOSTIC TESTING:     Xr Abdomen (kub) (single Ap View)    Result Date: 2021  EXAMINATION: ONE SUPINE XRAY VIEW(S) OF THE ABDOMEN 2021 5:01 pm COMPARISON: None. HISTORY: ORDERING SYSTEM PROVIDED HISTORY: abdominal pain TECHNOLOGIST PROVIDED HISTORY: abdominal pain Reason for Exam: diarrhea x 5 weeks. severe abdominal pain started yesterday. Acuity: Unknown Type of Exam: Unknown FINDINGS: 2 images are presented. Unremarkable bowel gas pattern. No unusual abdominal or pelvic soft tissue or calcific density is seen. Visualized osseous structures appear unremarkable. No radiopaque urinary collecting system calculus evident. Unremarkable bowel gas pattern. Ct Abdomen Pelvis W Iv Contrast Additional Contrast? None    Result Date: 2021  EXAMINATION: CT OF THE ABDOMEN AND PELVIS WITH CONTRAST 2021 2:55 pm HISTORY: ORDERING SYSTEM PROVIDED HISTORY: Abdominal pain, unspecified abdominal location TECHNOLOGIST PROVIDED HISTORY: eval for colitis, diverticulitis or hernia problem Reason for Exam: Pt. C/o low abdominal pain with diarrhea x 3 weeks. States she got a fever today. She has been on a couple rounds of antibiotic. Hx , umbilical hernia, and ectopic pregnancy. Acuity: Acute Type of Exam: Initial Relevant Medical/Surgical History: pt hx c-diff TECHNIQUE: Multidetector CT acquisition through the abdomen and pelvis was performed following the administration of nonionic intravenous contrast material. Automatic Exposure Control was utilized as a means of radiation dose reduction. FINDINGS: CT Abdomen:  Heart size is normal.  The visualized lung bases are clear. The liver, spleen, pancreas, gallbladder, kidneys, and adrenal glands appear normal. The stomach and the small and large bowel loops are normal in contour, caliber and morphology, without acute or significant abnormality.   No dilated loops or areas of bowel wall thickening. The appendix is normal.  There is no free fluid or extraluminal gas. No enlarged or suspicious mesenteric or retroperitoneal lymphadenopathy. The abdominal aorta and iliac arteries are patent and of normal caliber. No significant osseous abnormality. CT Pelvis: No enlarged or suspicious pelvic or inguinal lymphadenopathy. A small right ovarian follicles present, likely physiologic, and there is trace pelvic free fluid, likely also physiologic. No appreciable uterine or left adnexal abnormality. The urinary bladder and the pelvic bowel loops and osseous structures are unremarkable. Unremarkable contrast-enhanced CT examination of the abdomen and pelvis, without finding to account for the patient's symptoms. PHYSICAL EXAMINATION: Vital signs reviewed per the nursing documentation. /88   Pulse 88   Temp 98.3 °F (36.8 °C)   Resp 16   Ht 5' 3\" (1.6 m)   Wt 163 lb (73.9 kg)   LMP  (LMP Unknown)   BMI 28.87 kg/m²   Body mass index is 28.87 kg/m². Physical Exam  Vitals signs and nursing note reviewed. Constitutional:       General: She is not in acute distress. Appearance: She is well-developed. She is not diaphoretic. HENT:      Head: Normocephalic. Mouth/Throat:      Pharynx: No oropharyngeal exudate. Eyes:      General: No scleral icterus. Pupils: Pupils are equal, round, and reactive to light. Neck:      Musculoskeletal: Normal range of motion and neck supple. Thyroid: No thyromegaly. Vascular: No JVD. Trachea: No tracheal deviation. Cardiovascular:      Rate and Rhythm: Normal rate and regular rhythm. Heart sounds: Normal heart sounds. No murmur. Pulmonary:      Effort: Pulmonary effort is normal. No respiratory distress. Breath sounds: Normal breath sounds. No wheezing. Abdominal:      General: Bowel sounds are normal. There is no distension. Palpations: Abdomen is soft.       Tenderness: There is no abdominal tenderness. There is no guarding or rebound. Comments: No ascites   Musculoskeletal: Normal range of motion. Skin:     General: Skin is warm. Coloration: Skin is not pale. Findings: No erythema or rash. Comments: She is not diaphoretic   Neurological:      Mental Status: She is alert and oriented to person, place, and time. Deep Tendon Reflexes: Reflexes are normal and symmetric. Psychiatric:         Behavior: Behavior normal.         Thought Content: Thought content normal.         Judgment: Judgment normal.           IMPRESSION: Ms. JESSICA OCHOA [facility] is a 30 y.o. female with      Diagnosis Orders   1. Colitis  Celiac Disease Panel    T4, Free    TSH without Reflex   2. Loose stools  Celiac Disease Panel    T4, Free    TSH without Reflex     Will proceed with the above and watch  Patient to call me if the diarrhea recurs  Then will proceed with a colonoscopy and biopsies  And rule out C. difficile again  Otherwise we will just watch and proceed with the above    Diet/life style/natural hx /complication of the dx were all explained in details   Past medical, past surgical, social history, psychiatric history, medications or allergies, all reviewed and  updated    Thank you for allowing me to participate in the care of Ms. Ruelas. For any further questions please do not hesitate to contact me. I have reviewed and agree with the ROS entered by the MA/RN. Note is dictated utilizing voice recognition software. Unfortunately this leads to occasional typographical errors. Please contact our office if you have any questions.       Jyotsna Matthew MD  [facility]  O: #[phone]

## 2021-03-09 LAB
GLIADIN DEAMINIDATED PEPTIDE AB IGA: 0.5 U/ML
GLIADIN DEAMINIDATED PEPTIDE AB IGG: 1 U/ML
IGA: 133 MG/DL (ref 70–400)
TISSUE TRANSGLUTAMINASE IGA: 0.2 U/ML

## 2021-03-18 ENCOUNTER — OFFICE VISIT (OUTPATIENT)
Dept: PRIMARY CARE CLINIC | Age: 36
End: 2021-03-18
Payer: COMMERCIAL

## 2021-03-18 VITALS
TEMPERATURE: 97.2 F | HEIGHT: 63 IN | OXYGEN SATURATION: 96 % | WEIGHT: 163 LBS | SYSTOLIC BLOOD PRESSURE: 117 MMHG | DIASTOLIC BLOOD PRESSURE: 61 MMHG | BODY MASS INDEX: 28.88 KG/M2 | HEART RATE: 100 BPM

## 2021-03-18 DIAGNOSIS — J02.9 SORE THROAT: ICD-10-CM

## 2021-03-18 DIAGNOSIS — J02.0 ACUTE STREPTOCOCCAL PHARYNGITIS: Primary | ICD-10-CM

## 2021-03-18 LAB — S PYO AG THROAT QL: POSITIVE

## 2021-03-18 PROCEDURE — 99213 OFFICE O/P EST LOW 20 MIN: CPT | Performed by: NURSE PRACTITIONER

## 2021-03-18 PROCEDURE — 87880 STREP A ASSAY W/OPTIC: CPT | Performed by: NURSE PRACTITIONER

## 2021-03-18 RX ORDER — AZITHROMYCIN 250 MG/1
250 TABLET, FILM COATED ORAL SEE ADMIN INSTRUCTIONS
Qty: 6 TABLET | Refills: 0 | Status: SHIPPED | OUTPATIENT
Start: 2021-03-18 | End: 2021-03-23

## 2021-03-18 RX ORDER — GREEN TEA/HOODIA GORDONII 315-12.5MG
1 CAPSULE ORAL DAILY
Qty: 30 TABLET | Refills: 0 | Status: SHIPPED | OUTPATIENT
Start: 2021-03-18 | End: 2021-04-17

## 2021-03-18 NOTE — PROGRESS NOTES
[id number]  [address] [address]  Dept: [phone]  Dept Fax: [phone]    Charity Ingram is a 30 y.o. female who presents to the urgent care today for her medicalconditions/complaints as noted below. Charity Ingram is c/o of Covid Testing (pt has been having some chest pain and sore throat X 2 days )      HPI:         71-year-old female patient presents with complaint of chest pain, cough, scratchy throat. Symptoms been present for approximately 2 days. Patient's daughter recently test positive for strep. Reports chest pain intermittent, mild. Reports mild dry cough. Report scratchy sore throat. Denies fevers or chills. Denies congestion. Denies vomiting or diarrhea. Denies loss of taste smell. Treatments tried include none. Past Medical History:   Diagnosis Date    Acute anxiety         Current Outpatient Medications   Medication Sig Dispense Refill    Probiotic Acidophilus (FLORANEX) TABS Take 1 tablet by mouth daily 30 tablet 0    azithromycin (ZITHROMAX) 250 MG tablet Take 1 tablet by mouth See Admin Instructions for 5 days 500mg on day 1 followed by 250mg on days 2 - 5 6 tablet 0    diphenhydrAMINE (BENADRYL) 25 MG tablet Take 25 mg by mouth nightly as needed for Allergies or Sleep      Probiotic Product (PROBIOTIC ADVANCED PO) Take 1 capsule by mouth daily      SUMAtriptan (IMITREX) 50 MG tablet Take 1 tablet by mouth once as needed for Migraine (may repeat dose in 1 hour if not effective) 9 tablet 0     No current facility-administered medications for this visit. Allergies   Allergen Reactions    Tape Darrold Bloodgood Tape] Swelling     Surgical tape       Subjective:      Review of Systems   Constitutional: Negative for chills and fever. HENT: Positive for congestion and sore throat. Negative for ear pain and sinus pain. Respiratory: Positive for cough. Negative for shortness of breath.     Cardiovascular: Negative for chest pain and palpitations. Gastrointestinal: Negative for abdominal pain, diarrhea, nausea and vomiting. Neurological: Negative for dizziness and headaches. All other systems reviewed and are negative. Objective:     Physical Exam  Vitals signs and nursing note reviewed. Constitutional:       General: She is not in acute distress. Appearance: Normal appearance. She is not toxic-appearing. HENT:      Right Ear: Tympanic membrane normal.      Left Ear: Tympanic membrane normal.      Nose: Nose normal.      Mouth/Throat:      Mouth: Mucous membranes are moist.      Pharynx: Posterior oropharyngeal erythema present. Cardiovascular:      Rate and Rhythm: Normal rate. Pulmonary:      Effort: Pulmonary effort is normal. No respiratory distress. Breath sounds: Normal breath sounds. Skin:     General: Skin is warm and dry. Neurological:      General: No focal deficit present. Mental Status: She is alert and oriented to person, place, and time.        /61 (Site: Left Upper Arm, Position: Sitting, Cuff Size: Large Adult)   Pulse 100   Temp 97.2 °F (36.2 °C) (Tympanic)   Ht 5' 3\" (1.6 m)   Wt 163 lb (73.9 kg)   LMP  (LMP Unknown)   SpO2 96%   Breastfeeding No   BMI 28.87 kg/m²   Lab Review   Hospital Outpatient Visit on 03/08/2021   Component Date Value    Gliadin Deaminidated Pep* 03/08/2021 0.5     Gliadin Deaminidated Pep* 03/08/2021 1.0     IgA 03/08/2021 133     TISSUE TRANSGLUTAMINASE * 03/08/2021 0.2     Thyroxine, Free 03/08/2021 1.12     TSH 03/08/2021 1.24    Admission on 02/17/2021, Discharged on 02/18/2021   Component Date Value    Glucose 02/17/2021 93     BUN 02/17/2021 8     CREATININE 02/17/2021 0.60     Bun/Cre Ratio 02/17/2021 13     Calcium 02/17/2021 10.3     Sodium 02/17/2021 136     Potassium 02/17/2021 4.2     Chloride 02/17/2021 100     CO2 02/17/2021 18*    Anion Gap 02/17/2021 18*    GFR Non- 02/17/2021 >60     GFR  02/17/2021 >60     GFR Comment 02/17/2021          GFR Staging 02/17/2021 NOT REPORTED     WBC 02/17/2021 17.9*    RBC 02/17/2021 5.28*    Hemoglobin 02/17/2021 15.3*    Hematocrit 02/17/2021 48.9*    MCV 02/17/2021 92.6     MCH 02/17/2021 29.0     MCHC 02/17/2021 31.3     RDW 02/17/2021 13.4     Platelets 57/27/5810 360     MPV 02/17/2021 8.5     NRBC Automated 02/17/2021 0.0     Differential Type 02/17/2021 NOT REPORTED     Seg Neutrophils 02/17/2021 80*    Lymphocytes 02/17/2021 12*    Monocytes 02/17/2021 5     Eosinophils % 02/17/2021 1     Basophils 02/17/2021 1     Immature Granulocytes 02/17/2021 1*    Segs Absolute 02/17/2021 14.35*    Absolute Lymph # 02/17/2021 2.22     Absolute Mono # 02/17/2021 0.92     Absolute Eos # 02/17/2021 0.18     Basophils Absolute 02/17/2021 0.09     Absolute Immature Granul* 02/17/2021 0.12     WBC Morphology 02/17/2021 NOT REPORTED     RBC Morphology 02/17/2021 NOT REPORTED     Platelet Estimate 52/46/8950 NOT REPORTED     Albumin 02/17/2021 5.2     Alkaline Phosphatase 02/17/2021 60     ALT 02/17/2021 17     AST 02/17/2021 20     Total Bilirubin 02/17/2021 0.39     Bilirubin, Direct 02/17/2021 0.11     Bilirubin, Indirect 02/17/2021 0.28     Total Protein 02/17/2021 8.9*    Globulin 02/17/2021 NOT REPORTED     Albumin/Globulin Ratio 02/17/2021 NOT REPORTED     Lipase 02/17/2021 28     Specimen Description 02/17/2021 . FECES     C DIFF AG + TOXIN 02/17/2021 NEGATIVE     Specimen Description 02/17/2021 . FECES     Special Requests 02/17/2021 NOT REPORTED     Direct Exam 02/17/2021 Giardia Antigen Assay Negative     Direct Exam 02/17/2021 Cryptosporidium Antigen Assay Negative     Occult Blood, Stool #1 02/17/2021 POSITIVE*    Date, Stool #1 02/17/2021 0,719,812     Time, Stool #1 02/17/2021 1,515     Occult Blood, Stool #2 02/17/2021 NOT REPORTED     Date, Stool #2 02/17/2021 NOT REPORTED     Time, 02/18/2021 1     Immature Granulocytes 02/18/2021 0     Segs Absolute 02/18/2021 6.80     Absolute Lymph # 02/18/2021 1.98     Absolute Mono # 02/18/2021 0.52     Absolute Eos # 02/18/2021 0.17     Basophils Absolute 02/18/2021 0.05     Absolute Immature Granul* 02/18/2021 0.02     Glucose 02/18/2021 90     BUN 02/18/2021 5*    CREATININE 02/18/2021 0.56     Bun/Cre Ratio 02/18/2021 9     Calcium 02/18/2021 8.3*    Sodium 02/18/2021 138     Potassium 02/18/2021 4.0     Chloride 02/18/2021 109*    CO2 02/18/2021 19*    Anion Gap 02/18/2021 10     Alkaline Phosphatase 02/18/2021 44     ALT 02/18/2021 11     AST 02/18/2021 12     Total Bilirubin 02/18/2021 0.29*    Total Protein 02/18/2021 6.8     Albumin 02/18/2021 3.9     Albumin/Globulin Ratio 02/18/2021 NOT REPORTED     GFR Non- 02/18/2021 >60     GFR  02/18/2021 >60     GFR Comment 02/18/2021          GFR Staging 02/18/2021 NOT REPORTED     Magnesium 02/18/2021 1.9     Phosphorus 02/18/2021 2.8     Amylase 02/18/2021 67     Lipase 02/18/2021 19     Specimen Description 02/18/2021 . FECES     C Difficile tox, pcr 02/18/2021 NEGATIVE: C difficile tcdB nucleic acid not detected by RT-PCR.  HCG, Pregnancy Urine (PO* 02/17/2021 NEGATIVE     Lactoferrin, Qual 02/18/2021 POSITIVE for fecal lactoferrin, a marker for fecal leukocytes and an indicator of intestinal inflammation. *    Lactic Acid 02/18/2021 1.0     Specimen Description 02/18/2021 . BLOOD     Special Requests 02/18/2021 LAC     Culture 02/18/2021 NO GROWTH 6 DAYS     Procalcitonin 02/18/2021 0.05    Hospital Outpatient Visit on 01/22/2021   Component Date Value    Lactoferrin, Qual 01/22/2021 POSITIVE for fecal lactoferrin, a marker for fecal leukocytes and an indicator of intestinal inflammation. *    Specimen Description 01/22/2021 . FECES     C DIFF AG + TOXIN 01/22/2021 POSITIVE: C. difficile antigen and Toxin Detected*    Specimen Description 01/22/2021 . FECES     Campylobacter PCR 01/22/2021 NEGATIVE: No Campylobacter spp. (jejuni or coli) DNA Detected     Salmonella PCR 01/22/2021 NEGATIVE: No Salmonella spp. DNA Detected     Shigatoxin Gene PCR 01/22/2021 NEGATIVE: No Shiga toxin-producing gene(s) Detected     Shigella Sp PCR 01/22/2021 NEGATIVE: No Shigella spp. / EIEC DNA Detected     Plesiomonas Shigelloides* 01/22/2021 NEGATIVE: No Plesionomas shigelloides DNA Detected     Vibrio PCR 01/22/2021 NEGATIVE: No Vibrio (V. vulnificus, V, parahaemolyticus and V. cholerae) DNA Detected     E Coli Enterotoxigenic P* 01/22/2021 NEGATIVE: No Enterotoxigenic E. coli (ETEC) Heat-labile and heat-stable (LT/ST) DNA Detected     Yersinia Enterocolitica * 01/22/2021 NEGATIVE: No Yersinia enterocolitica DNA Detected        Assessment:       Diagnosis Orders   1. Acute streptococcal pharyngitis  Probiotic Acidophilus (FLORANEX) TABS    azithromycin (ZITHROMAX) 250 MG tablet   2. Sore throat  POCT rapid strep A    Probiotic Acidophilus (FLORANEX) TABS    azithromycin (ZITHROMAX) 250 MG tablet       Plan:      Return if symptoms worsen or fail to improve. Orders Placed This Encounter   Medications    Probiotic Acidophilus (FLORANEX) TABS     Sig: Take 1 tablet by mouth daily     Dispense:  30 tablet     Refill:  0    azithromycin (ZITHROMAX) 250 MG tablet     Sig: Take 1 tablet by mouth See Admin Instructions for 5 days 500mg on day 1 followed by 250mg on days 2 - 5     Dispense:  6 tablet     Refill:  0     Results for orders placed or performed in visit on 03/18/21   POCT rapid strep A   Result Value Ref Range    Strep A Ag Positive (A) None Detected         Patient instructed to complete entire antibiotic course. Discussed probiotic given c dif hx  Tylenol/Motrin as needed for fever/discomfort. Change toothbrush in 24 hours. Salt water gargles and throat lozenges if desired. Patient agreeable to treatment plan.   Educational materials provided on AVS.  Follow up if symptoms do not improve/worsen. Patient given educational materials - see patient instructions. Discussed use, benefit, and side effects of prescribed medications. All patientquestions answered. Pt voiced understanding. This note was transcribed using dictation with Dragon services. Efforts were made to correct any errors but some words may be misinterpreted.      Electronically signed by MARINA Ricci CNP on 3/19/2021at 11:18 AM

## 2021-03-18 NOTE — PATIENT INSTRUCTIONS

## 2021-03-19 ASSESSMENT — ENCOUNTER SYMPTOMS
NAUSEA: 0
SORE THROAT: 1
COUGH: 1
VOMITING: 0
DIARRHEA: 0
SHORTNESS OF BREATH: 0
ABDOMINAL PAIN: 0
SINUS PAIN: 0

## 2021-03-21 ENCOUNTER — TELEPHONE (OUTPATIENT)
Dept: PRIMARY CARE CLINIC | Age: 36
End: 2021-03-21

## 2021-03-22 ENCOUNTER — HOSPITAL ENCOUNTER (OUTPATIENT)
Age: 36
Setting detail: SPECIMEN
Discharge: HOME | End: 2021-03-22
Payer: COMMERCIAL

## 2021-03-22 ENCOUNTER — NURSE ONLY (OUTPATIENT)
Dept: PRIMARY CARE CLINIC | Age: 36
End: 2021-03-22

## 2021-03-22 DIAGNOSIS — R05.8 COUGH WITH EXPOSURE TO COVID-19 VIRUS: ICD-10-CM

## 2021-03-22 DIAGNOSIS — R05.8 COUGH WITH EXPOSURE TO COVID-19 VIRUS: Primary | ICD-10-CM

## 2021-03-22 DIAGNOSIS — Z20.822 COUGH WITH EXPOSURE TO COVID-19 VIRUS: ICD-10-CM

## 2021-03-22 DIAGNOSIS — Z20.822 COUGH WITH EXPOSURE TO COVID-19 VIRUS: Primary | ICD-10-CM

## 2021-03-23 LAB
SARS-COV-2: NORMAL
SARS-COV-2: NOT DETECTED
SOURCE: NORMAL

## 2021-04-22 ENCOUNTER — OFFICE VISIT (OUTPATIENT)
Dept: ORTHOPEDIC SURGERY | Age: 36
End: 2021-04-22
Payer: COMMERCIAL

## 2021-04-22 DIAGNOSIS — S62.344A CLOSED NONDISPLACED FRACTURE OF BASE OF FOURTH METACARPAL BONE OF RIGHT HAND, INITIAL ENCOUNTER: Primary | ICD-10-CM

## 2021-04-22 PROCEDURE — 26600 TREAT METACARPAL FRACTURE: CPT | Performed by: PHYSICIAN ASSISTANT

## 2021-04-22 PROCEDURE — 99204 OFFICE O/P NEW MOD 45 MIN: CPT | Performed by: PHYSICIAN ASSISTANT

## 2021-04-23 VITALS — TEMPERATURE: 97.2 F

## 2021-04-23 NOTE — PROGRESS NOTES
[address] [facility], 20 [facility], 35[address] [facility], [address] [facility]           Dept Phone: [phone]           Dept Fax:  [phone]           Zev Ramon          Dept Phone: [phone]           Dept Fax:  [phone]    Chief Compliant:  No chief complaint on file. Subjective:       Priscilla Huffman is a 30 y.o. right hand-dominant female here for evaluation and treatment of a right 4th metacarpal injury. The injury occurred on 4/19/21. Mechanism of injury was: punching a wall. Since that time the patient has been experiencing right hand pain and swelling. The pain is currently rated mild. The patient was originally seen at local urgent care where an x-ray was done, a fracture of the hand was identified and the patient was placed in a splint. The patient was subsequently referred to Orthopedics for further management. Patient reports she removed the splint and re-wrapped the hand/wrist with an ace wrap as she has a 3year old at home and could not change diapers with the splint in place. She denies any numbness or tingling. Patient's medications, allergies, past medical, surgical, social and family histories were reviewed and updated as appropriate. Review of Systems  Review of Systems   Constitutional: Negative for fever, chills, sweats, recent illness, or recent injury. Neurological: Negative for headaches, numbness, or weakness. Integumentary: Negative for rash, itching, ecchymosis, abrasions, or laceration. Musculoskeletal: Positive for No chief complaint on file. Objective:   Physical Exam:  Constitutional: Patient is oriented to person, place, and time. Patient appears well-developed and well nourished.    Musculoskeletal:       General:   alert, appears stated age and cooperative   Gait:    Normal Right Hand  Circulation:   warm, well perfused, brisk capillary refill distal to the injury   Skin:   ecchymosis   Swelling:  pain is perceived as mild (1-3  pain scale) swelling was present in the hand   Deformity:  There is not an obvious deformity of the hand. Finger ROM:   normal   Wrist ROM:  wrist flexion and extension was  Not assessed today   Sensation:   intact to light touch   Tenderness:    Point tenderness to the to the fourth metacarpal base. Mild tenderness to the fifth metacarpal base as well. Neurological: Patient is alert and oriented to person, place, and time. Normal strenght. No sensory deficit. Skin: Skin is warm and dry  Psychiatric: Behavior is normal. Thought content normal.  Nursing note and vitals reviewed. Imaging  Labs and Imaging:     XR taken today:  No results found. X-ray right hand 4/22/21  3 views of the right hand taken at outside urgent care available for review. Demonstrate a minimally displaced fourth metacarpal base fracture maintains adequate alignment. There does not appear to be intra-articular extension. No evidence of other acute abnormality noted. Assessment:     1. Closed nondisplaced fracture of base of fourth metacarpal bone of right hand, initial encounter               Plan: This is a 30 y.o. right hand dominant female who presents to the clinic today for evaluation of 4/19/21 4th metacarpal base fracture. 1. I discussed the entity of metacarpal fractures with the patient. I fully outlined the anatomy regarding the wrist.  All of her questions were answered fully. 2. At this point cast immobilization will be necessary for treatment of the fracture. A short arm cast was applied and molded into position. The patient was instructed on proper cast care and the need to keep it clean and dry. 3. The patient was encouraged to keep her arm elevated and iced for the next 24-48 hours.   4. Follow up will be in 4 weeks for cast check and reevaluation with wrist x-rays.

## 2021-04-27 ENCOUNTER — TELEPHONE (OUTPATIENT)
Dept: ORTHOPEDIC SURGERY | Age: 36
End: 2021-04-27

## 2021-04-27 ENCOUNTER — OFFICE VISIT (OUTPATIENT)
Dept: ORTHOPEDIC SURGERY | Age: 36
End: 2021-04-27

## 2021-04-27 VITALS — WEIGHT: 163 LBS | BODY MASS INDEX: 28.88 KG/M2 | RESPIRATION RATE: 12 BRPM | HEIGHT: 63 IN

## 2021-04-27 DIAGNOSIS — S62.344A CLOSED NONDISPLACED FRACTURE OF BASE OF FOURTH METACARPAL BONE OF RIGHT HAND, INITIAL ENCOUNTER: Primary | ICD-10-CM

## 2021-04-27 PROCEDURE — 99024 POSTOP FOLLOW-UP VISIT: CPT | Performed by: ORTHOPAEDIC SURGERY

## 2021-04-27 NOTE — PROGRESS NOTES
Patient ID: Sharri Michelle is a 30 y.o. female. Chief Complaint   Patient presents with    Follow-up     f/u for possible univalve        HPI     Please refer to previous clinic notes    Patient status post short arm cast for nondisplaced fourth metacarpal base fracture    Patient complains of pain and swelling in the cast    Past Medical History:   Diagnosis Date    Acute anxiety      Past Surgical History:   Procedure Laterality Date     SECTION       SECTION N/A 2019     SECTION performed by Jeison Tomlinson MD at [facility] L&D [facility] 9293      r fallopian tube gone    UMBILICAL HERNIA REPAIR N/A     HERNIA UMBILICAL REPAIR performed by Cammie Wong MD at [address] [facility] EXTRACTION       Family History   Problem Relation Age of Onset    Rheum Arthritis Father     Seizures Brother     Stroke Brother     Mental Illness Mother         anxiety and depression     Social History     Occupational History    Not on file   Tobacco Use    Smoking status: Former Smoker    Smokeless tobacco: Never Used   Substance and Sexual Activity    Alcohol use: Yes     Frequency: 2-4 times a month     Comment: soc    Drug use: Yes     Types: Marijuana    Sexual activity: Yes     Partners: Male        Review of Systems         Physical Exam  Vitals signs and nursing note reviewed. Constitutional:       Appearance: She is well-developed. HENT:      Head: Normocephalic and atraumatic. Nose: Nose normal.   Eyes:      Conjunctiva/sclera: Conjunctivae normal.   Neck:      Musculoskeletal: Normal range of motion and neck supple. Pulmonary:      Effort: Pulmonary effort is normal. No respiratory distress. Musculoskeletal:      Comments: Normal gait     Skin:     General: Skin is warm and dry. Neurological:      Mental Status: She is alert and oriented to person, place, and time. Sensory: No sensory deficit. Psychiatric:         Behavior: Behavior normal.         Thought Content: Thought content normal.     x-rays from last visit AP lateral oblique possible nondisplaced fourth metacarpal base fracture    Goal exam at this time reveals no significant swelling of the fingers normal finger range of motion    Assessment:          1. Closed nondisplaced fracture of base of fourth metacarpal bone of right hand, initial encounter        Plan:     Univalved cast    Follow-up as previously scheduled    No orders of the defined types were placed in this encounter. Edmundo Jackson MD    Please note that this chart was generated using voicerecognition Dragon dictation software. Although every effort was made to ensurethe accuracy of this automated transcription, some errors in transcription may haveoccurred.

## 2021-04-27 NOTE — TELEPHONE ENCOUNTER
Patient came into [place] office around 11:15 and was told that someone from our [place] office told her to come right on in and we would see her. At this point Maximino Silva who is treating her for current fx had left for the morning and heading to [facility] office for his afternoon patients. Patient brought back to a room by me, I evaluated patient cast looked fine and intact but patient complaining of horrible hand pain and shooting pain up the arm which is new. I told her I could relieve some person as I got a verbal from jesusita to do say. But I can not make the call. I did recommend that if your in pain we should also get xrays. After discussing concerns with patient for 10-15 minutes I voiced to her she should go to [facility] and be seen by Dr. Karime Ramos. Called and spoke with Dr. Karime Ramos and informed him I was sending him this patient now. Patient called back after lunch calling stating Dr. Karime Ramos is horrible and she left in tears. Called jesusita back asked if I could change cast and put a fast form on. Called patient back told her to come into the office here in [place] and I would change it for her and address any questions and follow up appointments with her.

## 2021-04-30 NOTE — DISCHARGE SUMMARY
[] [facility]        Outpatient Physical                Therapy       [address]       Phone: [phone]       Fax: [phone] [x] [facility]       Promotion at [address] [place] [address]       Phone: [phone]       Fax: [phone] [] Machelle Ortega [facility]      for Health Promotion     [address] [facility]     Phone: [phone]     Fax:  [phone]     Physical Therapy Discharge Note    Date: 2021      Patient: Miah Munoz  : 1985  MRN: [id number]    Physician: Dr. Noah Granda: KAREEMO  Medical Diagnosis: M84.375D (ICD-10-CM) - Stress fracture of metatarsal bone of left foot with routine healing  Rehab Codes: M25.572, M25.475, M25.675, R26.2NEC, M62.572  Onset date: 2020                                   Next Dr's appt.: 10/9/2020  Visit# / total visits:                       Cancels/No Shows: 3/0  Date of initial visit: 20                Date of final visit: 10/16/20       Discharge Status:     Pt failed to make additional appointments for therapy. Pt. Is now discharged. Electronically signed by: Nina Levin PT    If you have any questions or concerns, please don't hesitate to call.   Thank you for your referral.
Opt out

## 2021-05-07 ENCOUNTER — OFFICE VISIT (OUTPATIENT)
Dept: ORTHOPEDIC SURGERY | Age: 36
End: 2021-05-07

## 2021-05-07 DIAGNOSIS — S62.344D CLOSED NONDISPLACED FRACTURE OF BASE OF FOURTH METACARPAL BONE OF RIGHT HAND WITH ROUTINE HEALING, SUBSEQUENT ENCOUNTER: Primary | ICD-10-CM

## 2021-05-07 PROCEDURE — 99024 POSTOP FOLLOW-UP VISIT: CPT | Performed by: PHYSICIAN ASSISTANT

## 2021-05-07 NOTE — PROGRESS NOTES
[address] [facility], [address], [address] [facility], [address] [facility]           Dept Phone: [phone]           Dept Fax:  [phone] [address] [facility]           Zev Ramon          Dept Phone: [phone]           Dept Fax:  [phone]      Chief Compliant:  Chief Complaint   Patient presents with    Follow-up     right wrist         History of Present Illness:  Cam Coreas returns today. This is a 30 y.o. female who presents to the clinic today for follow up of right fourth metacarpal base fracture. Injury occurred on 4/19/2021. Patient was initial eval by me on 4/22/2021 and placed in a short arm cast.    Patient presented to our office on 4/27/2021 without an appointment unfortunately I was gone from the [place] [facility] location went over to [facility] the day. Our nurse at that time did evaluate her did not appear to show any significant tightness in the cast however due to patient's pain nurse contacted me and was cleared to [facility] the cast however felt more comfortable having the patient evaluated. Patient was instructed at that time that would have been happy to see her however she could not wait until I was seeing patients later that afternoon so she drove over to our [facility] office to be evaluated by Dr. Angela Aguero. He did evaluate her at that time did not appear to show any cast impingement however he did univalved the cast due to patient's complaints. Shortly thereafter patient remove the cast on her own and called our [place] office again stating that she could not tolerate the cast and presented for further evaluation. Our nurse at that time did fit her for a fast form short arm cast.  Patient has remained in that over the last 2 weeks    Patient returns today stating she has tolerated the new cast without any issues. She has had no pain.   She y.o. female who presents to the clinic today for follow up fourth metacarpal base fracture site. Patient demonstrates radiographic evidence of healing and has no tenderness on clinical examination. From our standpoint she is clinically healed. 1.  Short arm cast is discontinued  2. Patient is provided with a Velcro wrist splint which she is to wear with discomfort and heavy lifting over the next 2 weeks and she can gradually wean out of it afterwards. No need to wear this at rest or when sleeping. 3.  All questions and concerns were addressed today. Patient overall is doing very well today with no pain on exam just some stiffness. She is given a home exercise program to work on range of motion. We will see her back on a as needed basis. Please note that this chart was generated using voice recognition Dragon dictation software. Although every effort was made to ensure the accuracy of this automated transcription, some errors in transcription may have occurred.

## 2021-05-10 ENCOUNTER — TELEPHONE (OUTPATIENT)
Dept: BURN CARE | Age: 36
End: 2021-05-10

## 2021-06-14 ENCOUNTER — OFFICE VISIT (OUTPATIENT)
Dept: GASTROENTEROLOGY | Age: 36
End: 2021-06-14
Payer: COMMERCIAL

## 2021-06-14 VITALS
WEIGHT: 163 LBS | RESPIRATION RATE: 16 BRPM | BODY MASS INDEX: 28.87 KG/M2 | SYSTOLIC BLOOD PRESSURE: 137 MMHG | DIASTOLIC BLOOD PRESSURE: 87 MMHG | HEART RATE: 87 BPM

## 2021-06-14 DIAGNOSIS — K52.9 COLITIS: Primary | ICD-10-CM

## 2021-06-14 DIAGNOSIS — Z86.19 HISTORY OF CLOSTRIDIOIDES DIFFICILE INFECTION: ICD-10-CM

## 2021-06-14 PROCEDURE — 99213 OFFICE O/P EST LOW 20 MIN: CPT | Performed by: INTERNAL MEDICINE

## 2021-06-14 ASSESSMENT — ENCOUNTER SYMPTOMS
CHOKING: 0
VOMITING: 0
COUGH: 0
BLOOD IN STOOL: 0
ANAL BLEEDING: 0
WHEEZING: 0
ABDOMINAL DISTENTION: 0
RECTAL PAIN: 0
DIARRHEA: 0
TROUBLE SWALLOWING: 0
NAUSEA: 0
ABDOMINAL PAIN: 0
CONSTIPATION: 0

## 2021-06-14 NOTE — PROGRESS NOTES
GI CLINIC FOLLOW UP    INTERVAL HISTORY:   No referring provider defined for this encounter. Chief Complaint   Patient presents with    Follow-up     Patient is f/u on loose stool and labs. She states she has cooralated her stool to food. HISTORY OF PRESENT ILLNESS: Chace Pereira is a 30 y.o. female , referred for evaluation of C. Difficile  Diarrhea    Patient is here for follow-up please refer to the previous note we have seen this patient in the past  Treated her for C. Difficile  Today she said she has no symptoms, no diarrhea no abdominal pain no fever no chills no black stool or blood in the stool. No abdominal pain  Eating and drinking well  She is here for follow-up last visit we ordered celiac sprue markers which were negative and thyroid function test which were normal  She said she figured couple of food which make her have some diarrhea and she is to avoid those  She is probably has lactose intolerance also although does not very significant she said and not always    Past Medical,Family, and Social History reviewed and does contribute to the patient presentingcondition. Patient's PMH/PSH,SH,PSYCH Hx, MEDs, ALLERGIES, and ROS were all reviewed and updated in the appropriate sections. PAST MEDICAL HISTORY:  Past Medical History:   Diagnosis Date    Acute anxiety        Past Surgical History:   Procedure Laterality Date     SECTION       SECTION N/A 2019     SECTION performed by Suzanna Rawls MD at [facility] L&D [facility] 9293      r fallopian tube gone    UMBILICAL HERNIA REPAIR N/A     HERNIA UMBILICAL REPAIR performed by Dianna Rubin MD at [address] [place] [address]:  No current outpatient medications on file.     ALLERGIES:   Allergies   Allergen Reactions    Tape [Adhesive Tape] Swelling     Surgical tape       FAMILY HISTORY:       Problem Relation Age of Onset    Rheum Arthritis Father     Seizures Brother     Stroke Brother     Mental Illness Mother         anxiety and depression         SOCIAL HISTORY:   Social History     Socioeconomic History    Marital status:      Spouse name: Not on file    Number of children: Not on file    Years of education: Not on file    Highest education level: Not on file   Occupational History    Not on file   Tobacco Use    Smoking status: Former Smoker    Smokeless tobacco: Never Used   Vaping Use    Vaping Use: Never used   Substance and Sexual Activity    Alcohol use: Yes     Comment: soc    Drug use: Yes     Types: Marijuana    Sexual activity: Yes     Partners: Male   Other Topics Concern    Not on file   Social History Narrative    ** Merged History Encounter **          Social Determinants of Health     Financial Resource Strain:     Difficulty of Paying Living Expenses:    Food Insecurity:     Worried About Running Out of Food in the Last Year:     Ran Out of Food in the Last Year:    Transportation Needs:     Lack of Transportation (Medical):  Lack of Transportation (Non-Medical):    Physical Activity:     Days of Exercise per Week:     Minutes of Exercise per Session:    Stress:     Feeling of Stress :    Social Connections:     Frequency of Communication with Friends and Family:     Frequency of Social Gatherings with Friends and Family:     Attends Yarsanism Services:     Active Member of Clubs or Organizations:     Attends Club or Organization Meetings:     Marital Status:    Intimate Partner Violence:     Fear of Current or Ex-Partner:     Emotionally Abused:     Physically Abused:     Sexually Abused:        REVIEW OF SYSTEMS: A 12-point review of systemswas obtained and pertinent positives and negatives were enumerated above in the history of present illness. All other reviewed systems / symptoms were negative.     Review of Systems   Constitutional: Negative for appetite change, fatigue and unexpected weight change. HENT: Negative for trouble swallowing. Respiratory: Negative for cough, choking and wheezing. Cardiovascular: Negative for chest pain, palpitations and leg swelling. Gastrointestinal: Negative for abdominal distention, abdominal pain, anal bleeding, blood in stool, constipation, diarrhea, nausea, rectal pain and vomiting. Genitourinary: Negative for difficulty urinating. Allergic/Immunologic: Negative for environmental allergies and food allergies. Neurological: Negative for dizziness, weakness, light-headedness, numbness and headaches. Hematological: Bruises/bleeds easily. Psychiatric/Behavioral: Negative for sleep disturbance. The patient is nervous/anxious. LABORATORY DATA: Reviewed  Lab Results   Component Value Date    WBC 9.5 02/18/2021    HGB 12.4 02/18/2021    HCT 39.0 02/18/2021    MCV 91.3 02/18/2021     02/18/2021     02/18/2021    K 4.0 02/18/2021     (H) 02/18/2021    CO2 19 (L) 02/18/2021    BUN 5 (L) 02/18/2021    CREATININE 0.56 02/18/2021    LABALBU 3.9 02/18/2021    BILITOT 0.29 (L) 02/18/2021    ALKPHOS 44 02/18/2021    AST 12 02/18/2021    ALT 11 02/18/2021         Lab Results   Component Value Date    RBC 4.27 02/18/2021    HGB 12.4 02/18/2021    MCV 91.3 02/18/2021    MCH 29.0 02/18/2021    MCHC 31.8 02/18/2021    RDW 13.4 02/18/2021    MPV 8.7 02/18/2021    BASOPCT 1 02/18/2021    LYMPHSABS 1.98 02/18/2021    MONOSABS 0.52 02/18/2021    NEUTROABS 6.80 02/18/2021    EOSABS 0.17 02/18/2021    BASOSABS 0.05 02/18/2021         DIAGNOSTIC TESTING:     No results found. PHYSICAL EXAMINATION: Vital signs reviewed per the nursing documentation. /87   Pulse 87   Resp 16   Wt 163 lb (73.9 kg)   BMI 28.87 kg/m²   Body mass index is 28.87 kg/m². Physical Exam  Vitals and nursing note reviewed. Constitutional:       General: She is not in acute distress.      Appearance: She is well-developed. She is not diaphoretic. HENT:      Head: Normocephalic. Mouth/Throat:      Pharynx: No oropharyngeal exudate. Eyes:      General: No scleral icterus. Pupils: Pupils are equal, round, and reactive to light. Neck:      Thyroid: No thyromegaly. Vascular: No JVD. Trachea: No tracheal deviation. Cardiovascular:      Rate and Rhythm: Normal rate and regular rhythm. Heart sounds: Normal heart sounds. No murmur heard. Pulmonary:      Effort: Pulmonary effort is normal. No respiratory distress. Breath sounds: Normal breath sounds. No wheezing. Abdominal:      General: Bowel sounds are normal. There is no distension. Palpations: Abdomen is soft. Tenderness: There is no abdominal tenderness. There is no guarding or rebound. Comments: No ascites   Musculoskeletal:         General: Normal range of motion. Cervical back: Normal range of motion and neck supple. Skin:     General: Skin is warm. Coloration: Skin is not pale. Findings: No erythema or rash. Comments: She is not diaphoretic   Neurological:      Mental Status: She is alert and oriented to person, place, and time. Deep Tendon Reflexes: Reflexes are normal and symmetric. Psychiatric:         Behavior: Behavior normal.         Thought Content: Thought content normal.         Judgment: Judgment normal.           IMPRESSION: Ms. JESSICA OCHOA [facility] is a 30 y.o. female with      Diagnosis Orders   1. Colitis     2.  History of Clostridioides difficile infection       As long the patient is asymptomatic we will continue to watch  Labs are negative as mentioned  CAT scans been negative  Told her to report any recurrence of any symptoms or any new symptoms otherwise we will just watch    Diet/life style/natural hx /complication of the dx were all explained in details   Past medical, past surgical, social history, psychiatric history, medications or allergies, all reviewed and  updated

## 2022-04-11 DIAGNOSIS — Z82.61 FAMILY HISTORY OF RHEUMATOID ARTHRITIS: Primary | ICD-10-CM

## 2022-10-11 NOTE — ED NOTES
Dr Santos Peck at pt bedside.       Ginette Mello RN  02/17/21 2973
Pt to ed with c/o lower abdominal pain with nausea. Pt states she was dx with c-diff in January after multiple days of diarrhea. Pt state she finished course of flagyl and vancomycin. Pt states she has been having on-going issues with lower abdominal pain and diarrhea. Pt states she had an out-pt ct done yesterday. Pt states this morning she had increased pain and noticed blood in stool. Pt a&ox3. Skin warm and dry. Respirations even and non-labored.       Janett Quinn RN  02/17/21 5811
home

## (undated) DEVICE — SOLUTION SOD CHL 0.9% 1000ML

## (undated) DEVICE — SUTURE MCRYL SZ 0 L36IN ABSRB VLT L48MM CTX 1/2 CIR Y398H

## (undated) DEVICE — SUTURE PDS II SZ 0 L27IN ABSRB VLT UR-6 L26MM 1/2 CIR D7185

## (undated) DEVICE — GLOVE SURG SZ 65 L12IN FNGR THK87MIL WHT LTX FREE

## (undated) DEVICE — STERILE POLYISOPRENE POWDER-FREE SURGICAL GLOVES WITH EMOLLIENT COATING: Brand: PROTEXIS

## (undated) DEVICE — CHLORAPREP 26ML ORANGE

## (undated) DEVICE — GARMENT,MEDLINE,DVT,INT,CALF,MED, GEN2: Brand: MEDLINE

## (undated) DEVICE — YANKAUER,FLEXIBLE HANDLE,REGLR CAPACITY: Brand: MEDLINE INDUSTRIES, INC.

## (undated) DEVICE — GLOVE SURG SZ 7 L12IN FNGR THK87MIL WHT LTX FREE

## (undated) DEVICE — TOWEL SURG W16XL26IN WHT NONFENESTRATED ST 2 PER PK

## (undated) DEVICE — SUTURE VCRL 3-0 L36IN ABSRB VLT CT-1 L36MM 1/2 CIR J344H

## (undated) DEVICE — GLOVE SURG SZ 75 L12IN FNGR THK87MIL WHT LTX FREE

## (undated) DEVICE — SKIN AFFIX SURG ADHESIVE 72/CS 0.55ML: Brand: MEDLINE

## (undated) DEVICE — HERNIA PK

## (undated) DEVICE — GLOVE SURG SZ 8 L12IN FNGR THK87MIL WHT LTX FREE

## (undated) DEVICE — STAPLER SKIN L39MM DIA0.53MM CRWN 5.7MM S STL FIX HD PROX

## (undated) DEVICE — Z DUP USE 2522782 SOLUTION IRRIG 1000ML STRL H2O PLAS CONTAINER UROMATIC

## (undated) DEVICE — BLANKET WRM W29.9XL79.1IN UP BODY FORC AIR MISTRAL-AIR

## (undated) DEVICE — HYPODERMIC SAFETY NEEDLE: Brand: MAGELLAN

## (undated) DEVICE — INTENDED FOR TISSUE SEPARATION, AND OTHER PROCEDURES THAT REQUIRE A SHARP SURGICAL BLADE TO PUNCTURE OR CUT.: Brand: BARD-PARKER ® CARBON RIB-BACK BLADES

## (undated) DEVICE — KENDALL SCD EXPRESS SLEEVES, KNEE LENGTH, MEDIUM: Brand: KENDALL SCD